# Patient Record
Sex: FEMALE | Race: WHITE | NOT HISPANIC OR LATINO | Employment: OTHER | URBAN - METROPOLITAN AREA
[De-identification: names, ages, dates, MRNs, and addresses within clinical notes are randomized per-mention and may not be internally consistent; named-entity substitution may affect disease eponyms.]

---

## 2023-08-27 PROBLEM — R73.03 PREDIABETES: Status: ACTIVE | Noted: 2023-08-27

## 2023-08-27 PROBLEM — R41.81 AGE-RELATED COGNITIVE DECLINE: Status: ACTIVE | Noted: 2023-08-27

## 2023-08-27 PROBLEM — F32.A DEPRESSION: Status: ACTIVE | Noted: 2023-08-27

## 2023-08-27 PROBLEM — E03.9 HYPOTHYROIDISM: Status: ACTIVE | Noted: 2023-08-27

## 2023-08-27 PROBLEM — F41.9 ANXIETY: Status: ACTIVE | Noted: 2023-08-27

## 2023-08-27 PROBLEM — E78.2 MIXED HYPERLIPIDEMIA: Status: ACTIVE | Noted: 2023-08-27

## 2023-08-27 RX ORDER — LEVOTHYROXINE SODIUM 25 UG/1
25 TABLET ORAL
COMMUNITY
Start: 2022-02-21 | End: 2023-10-05 | Stop reason: SDUPTHER

## 2023-08-27 RX ORDER — HYDROCHLOROTHIAZIDE 12.5 MG/1
12.5 CAPSULE ORAL DAILY
COMMUNITY
End: 2024-02-08 | Stop reason: ALTCHOICE

## 2023-08-27 RX ORDER — SIMVASTATIN 20 MG/1
20 TABLET, FILM COATED ORAL EVERY EVENING
COMMUNITY
Start: 2013-09-30 | End: 2024-02-08 | Stop reason: SDUPTHER

## 2023-08-27 RX ORDER — ACETAMINOPHEN 500 MG
5 TABLET ORAL DAILY
COMMUNITY

## 2024-01-02 ENCOUNTER — TELEPHONE (OUTPATIENT)
Dept: PRIMARY CARE | Facility: CLINIC | Age: 79
End: 2024-01-02
Payer: COMMERCIAL

## 2024-02-08 ENCOUNTER — HOSPITAL ENCOUNTER (OUTPATIENT)
Dept: RADIOLOGY | Facility: CLINIC | Age: 79
Discharge: HOME | End: 2024-02-08
Payer: COMMERCIAL

## 2024-02-08 ENCOUNTER — OFFICE VISIT (OUTPATIENT)
Dept: PRIMARY CARE | Facility: CLINIC | Age: 79
End: 2024-02-08
Payer: COMMERCIAL

## 2024-02-08 ENCOUNTER — LAB (OUTPATIENT)
Dept: LAB | Facility: LAB | Age: 79
End: 2024-02-08
Payer: COMMERCIAL

## 2024-02-08 VITALS
OXYGEN SATURATION: 96 % | HEART RATE: 65 BPM | TEMPERATURE: 98.3 F | HEIGHT: 60 IN | SYSTOLIC BLOOD PRESSURE: 148 MMHG | BODY MASS INDEX: 35.34 KG/M2 | WEIGHT: 180 LBS | DIASTOLIC BLOOD PRESSURE: 82 MMHG

## 2024-02-08 DIAGNOSIS — M15.9 PRIMARY OSTEOARTHRITIS INVOLVING MULTIPLE JOINTS: ICD-10-CM

## 2024-02-08 DIAGNOSIS — Z01.89 ENCOUNTER FOR ROUTINE LABORATORY TESTING: ICD-10-CM

## 2024-02-08 DIAGNOSIS — R41.81 AGE-RELATED COGNITIVE DECLINE: ICD-10-CM

## 2024-02-08 DIAGNOSIS — R60.0 BILATERAL LOWER EXTREMITY EDEMA: ICD-10-CM

## 2024-02-08 DIAGNOSIS — R53.81 PHYSICAL DEBILITY: ICD-10-CM

## 2024-02-08 DIAGNOSIS — E55.9 VITAMIN D DEFICIENCY: ICD-10-CM

## 2024-02-08 DIAGNOSIS — E78.2 MIXED HYPERLIPIDEMIA: ICD-10-CM

## 2024-02-08 DIAGNOSIS — E03.8 HYPOTHYROIDISM DUE TO HASHIMOTO'S THYROIDITIS: ICD-10-CM

## 2024-02-08 DIAGNOSIS — F41.9 ANXIETY: ICD-10-CM

## 2024-02-08 DIAGNOSIS — R73.03 PREDIABETES: ICD-10-CM

## 2024-02-08 DIAGNOSIS — E06.3 HYPOTHYROIDISM DUE TO HASHIMOTO'S THYROIDITIS: ICD-10-CM

## 2024-02-08 DIAGNOSIS — F32.A DEPRESSION, UNSPECIFIED DEPRESSION TYPE: ICD-10-CM

## 2024-02-08 DIAGNOSIS — R53.81 PHYSICAL DEBILITY: Primary | ICD-10-CM

## 2024-02-08 PROBLEM — I10 ESSENTIAL HYPERTENSION: Status: ACTIVE | Noted: 2024-02-08

## 2024-02-08 PROBLEM — M15.0 PRIMARY OSTEOARTHRITIS INVOLVING MULTIPLE JOINTS: Status: ACTIVE | Noted: 2024-02-08

## 2024-02-08 LAB
25(OH)D3 SERPL-MCNC: 29 NG/ML (ref 31–100)
ALBUMIN SERPL-MCNC: 4.2 G/DL (ref 3.5–5)
ALP BLD-CCNC: 87 U/L (ref 35–125)
ALT SERPL-CCNC: 19 U/L (ref 5–40)
ANION GAP SERPL CALC-SCNC: 11 MMOL/L
AST SERPL-CCNC: 22 U/L (ref 5–40)
BASOPHILS # BLD AUTO: 0.11 X10*3/UL (ref 0–0.1)
BASOPHILS NFR BLD AUTO: 1.6 %
BILIRUB DIRECT SERPL-MCNC: <0.2 MG/DL (ref 0–0.2)
BILIRUB SERPL-MCNC: 0.2 MG/DL (ref 0.1–1.2)
BUN SERPL-MCNC: 24 MG/DL (ref 8–25)
CALCIUM SERPL-MCNC: 9.4 MG/DL (ref 8.5–10.4)
CHLORIDE SERPL-SCNC: 102 MMOL/L (ref 97–107)
CO2 SERPL-SCNC: 32 MMOL/L (ref 24–31)
CREAT SERPL-MCNC: 0.8 MG/DL (ref 0.4–1.6)
EGFRCR SERPLBLD CKD-EPI 2021: 75 ML/MIN/1.73M*2
EOSINOPHIL # BLD AUTO: 0.21 X10*3/UL (ref 0–0.4)
EOSINOPHIL NFR BLD AUTO: 3.1 %
ERYTHROCYTE [DISTWIDTH] IN BLOOD BY AUTOMATED COUNT: 13.2 % (ref 11.5–14.5)
GLUCOSE SERPL-MCNC: 98 MG/DL (ref 65–99)
HCT VFR BLD AUTO: 38.7 % (ref 36–46)
HGB BLD-MCNC: 12.5 G/DL (ref 12–16)
IMM GRANULOCYTES # BLD AUTO: 0.02 X10*3/UL (ref 0–0.5)
IMM GRANULOCYTES NFR BLD AUTO: 0.3 % (ref 0–0.9)
LYMPHOCYTES # BLD AUTO: 2.13 X10*3/UL (ref 0.8–3)
LYMPHOCYTES NFR BLD AUTO: 31 %
MCH RBC QN AUTO: 27.8 PG (ref 26–34)
MCHC RBC AUTO-ENTMCNC: 32.3 G/DL (ref 32–36)
MCV RBC AUTO: 86 FL (ref 80–100)
MONOCYTES # BLD AUTO: 0.7 X10*3/UL (ref 0.05–0.8)
MONOCYTES NFR BLD AUTO: 10.2 %
NEUTROPHILS # BLD AUTO: 3.71 X10*3/UL (ref 1.6–5.5)
NEUTROPHILS NFR BLD AUTO: 53.8 %
NRBC BLD-RTO: 0 /100 WBCS (ref 0–0)
PLATELET # BLD AUTO: 293 X10*3/UL (ref 150–450)
POTASSIUM SERPL-SCNC: 4.1 MMOL/L (ref 3.4–5.1)
PROT SERPL-MCNC: 6.9 G/DL (ref 5.9–7.9)
RBC # BLD AUTO: 4.5 X10*6/UL (ref 4–5.2)
SODIUM SERPL-SCNC: 145 MMOL/L (ref 133–145)
T4 FREE SERPL-MCNC: 0.9 NG/DL (ref 0.9–1.7)
TSH SERPL DL<=0.05 MIU/L-ACNC: 5.44 MIU/L (ref 0.27–4.2)
WBC # BLD AUTO: 6.9 X10*3/UL (ref 4.4–11.3)

## 2024-02-08 PROCEDURE — 82306 VITAMIN D 25 HYDROXY: CPT

## 2024-02-08 PROCEDURE — 1126F AMNT PAIN NOTED NONE PRSNT: CPT | Performed by: STUDENT IN AN ORGANIZED HEALTH CARE EDUCATION/TRAINING PROGRAM

## 2024-02-08 PROCEDURE — 1036F TOBACCO NON-USER: CPT | Performed by: STUDENT IN AN ORGANIZED HEALTH CARE EDUCATION/TRAINING PROGRAM

## 2024-02-08 PROCEDURE — 84439 ASSAY OF FREE THYROXINE: CPT

## 2024-02-08 PROCEDURE — 3077F SYST BP >= 140 MM HG: CPT | Performed by: STUDENT IN AN ORGANIZED HEALTH CARE EDUCATION/TRAINING PROGRAM

## 2024-02-08 PROCEDURE — 73562 X-RAY EXAM OF KNEE 3: CPT | Mod: LT

## 2024-02-08 PROCEDURE — 82248 BILIRUBIN DIRECT: CPT

## 2024-02-08 PROCEDURE — 3079F DIAST BP 80-89 MM HG: CPT | Performed by: STUDENT IN AN ORGANIZED HEALTH CARE EDUCATION/TRAINING PROGRAM

## 2024-02-08 PROCEDURE — 84443 ASSAY THYROID STIM HORMONE: CPT

## 2024-02-08 PROCEDURE — 85025 COMPLETE CBC W/AUTO DIFF WBC: CPT

## 2024-02-08 PROCEDURE — 99215 OFFICE O/P EST HI 40 MIN: CPT | Performed by: STUDENT IN AN ORGANIZED HEALTH CARE EDUCATION/TRAINING PROGRAM

## 2024-02-08 PROCEDURE — 36415 COLL VENOUS BLD VENIPUNCTURE: CPT

## 2024-02-08 PROCEDURE — 1159F MED LIST DOCD IN RCRD: CPT | Performed by: STUDENT IN AN ORGANIZED HEALTH CARE EDUCATION/TRAINING PROGRAM

## 2024-02-08 PROCEDURE — 1160F RVW MEDS BY RX/DR IN RCRD: CPT | Performed by: STUDENT IN AN ORGANIZED HEALTH CARE EDUCATION/TRAINING PROGRAM

## 2024-02-08 PROCEDURE — 80053 COMPREHEN METABOLIC PANEL: CPT

## 2024-02-08 RX ORDER — SIMVASTATIN 20 MG/1
20 TABLET, FILM COATED ORAL DAILY
Start: 2024-02-08 | End: 2024-03-21 | Stop reason: SDUPTHER

## 2024-02-08 RX ORDER — LEVOTHYROXINE SODIUM 25 UG/1
25 TABLET ORAL
Start: 2024-02-08 | End: 2024-03-21 | Stop reason: SDUPTHER

## 2024-02-08 RX ORDER — FUROSEMIDE 20 MG/1
20 TABLET ORAL DAILY
COMMUNITY
Start: 2023-06-22 | End: 2024-02-08 | Stop reason: SDUPTHER

## 2024-02-08 RX ORDER — PAROXETINE HYDROCHLORIDE 40 MG/1
40 TABLET, FILM COATED ORAL DAILY
COMMUNITY
Start: 2023-08-14 | End: 2024-02-08 | Stop reason: SDUPTHER

## 2024-02-08 RX ORDER — PAROXETINE HYDROCHLORIDE 40 MG/1
40 TABLET, FILM COATED ORAL DAILY
Start: 2024-02-08 | End: 2024-03-21 | Stop reason: SDUPTHER

## 2024-02-08 RX ORDER — FUROSEMIDE 40 MG/1
40 TABLET ORAL DAILY
Qty: 90 TABLET | Refills: 3 | Status: SHIPPED | OUTPATIENT
Start: 2024-02-08 | End: 2024-03-21 | Stop reason: SDUPTHER

## 2024-02-08 ASSESSMENT — PAIN SCALES - GENERAL: PAINLEVEL: 0-NO PAIN

## 2024-02-08 ASSESSMENT — ENCOUNTER SYMPTOMS
GASTROINTESTINAL NEGATIVE: 1
CONSTITUTIONAL NEGATIVE: 1
CARDIOVASCULAR NEGATIVE: 1
RESPIRATORY NEGATIVE: 1

## 2024-02-08 ASSESSMENT — PATIENT HEALTH QUESTIONNAIRE - PHQ9
SUM OF ALL RESPONSES TO PHQ9 QUESTIONS 1 AND 2: 0
2. FEELING DOWN, DEPRESSED OR HOPELESS: NOT AT ALL
1. LITTLE INTEREST OR PLEASURE IN DOING THINGS: NOT AT ALL

## 2024-02-08 NOTE — PROGRESS NOTES
[facility]: [facility]  PROGRESS NOTE      Carmen Cao is a 78 y.o. female that is presenting today for Follow-up (6 month).    Assessment/Plan   Diagnoses and all orders for this visit:  Physical debility  -     Follow Up In Primary Care; Future  -     CBC and Auto Differential; Future  Age-related cognitive decline  -     Follow Up In Primary Care; Future  -     CBC and Auto Differential; Future  Hypothyroidism due to Hashimoto's thyroiditis  -     levothyroxine (Synthroid, Levoxyl) 25 mcg tablet; Take 1 tablet (25 mcg) by mouth once daily in the morning. Take before meals.  -     Follow Up In Primary Care; Future  -     TSH with reflex to Free T4 if abnormal; Future  Primary osteoarthritis involving multiple joints  -     Follow Up In Primary Care; Future  -     XR knee left 3 views; Future  Depression, unspecified depression type  -     PARoxetine (Paxil) 40 mg tablet; Take 1 tablet (40 mg) by mouth once daily.  -     Follow Up In Primary Care; Future  Bilateral lower extremity edema  -     furosemide (Lasix) 40 mg tablet; Take 1 tablet (40 mg) by mouth once daily.  -     Follow Up In Primary Care; Future  Mixed hyperlipidemia  -     simvastatin (Zocor) 20 mg tablet; Take 1 tablet (20 mg) by mouth once daily.  -     Follow Up In Primary Care; Future  Vitamin D deficiency  -     Follow Up In Primary Care; Future  -     Vitamin D 25-Hydroxy,Total (for eval of Vitamin D levels); Future  Prediabetes  -     Follow Up In Primary Care; Future  Anxiety  -     PARoxetine (Paxil) 40 mg tablet; Take 1 tablet (40 mg) by mouth once daily.  -     Follow Up In Primary Care; Future  Encounter for routine laboratory testing  -     CBC and Auto Differential; Future  -     Basic Metabolic Panel; Future  -     Hepatic Function Panel; Future  -     TSH with reflex to Free T4 if abnormal; Future  -     Vitamin D 25-Hydroxy,Total (for eval of Vitamin D levels); Future    - Patient's family (daughter, Thierno)  "is becoming progressively more upset and frustrated with the patient. Patient's daughter states that both she and the patient's spouse feel that the patient is becoming progressively more weak and fatigued and continues to fall at home. They are worried that the patient is becoming progressively less able to take care of herself.  - Discussed the situation with the patient. Patient denies that this is a problem, denies that she is falling more frequently at home. When asked about all of this though, she often states, \"I don't remember.\"   - MMSE done today with the patient. Scored a 26, lost points for day of the week alongside serial 7s.   - When I asked the patient what she does during the day, she deflects and tells me the things that she does not do during the day.   - This is a difficult situation. Based off of my evaluation with the patient, I do think that she lacks insight into what her problem is. At this moment, she blames most other people in her life for her own apathy and indifference. She says that she has nothing to do because her cleaning person does it for her. At the moment, I feel that the patient lacks insight and accountability for her own actions. Counseled the patient at length with lifestyle interventions.    Subjective   - The patient otherwise feels well and denies any acute symptoms or concerns at this time.  - The patient denies any changes or progression of their chronic medical problems.  - The patient denies any problems or concerns with their medications.      Review of Systems   Constitutional: Negative.    Respiratory: Negative.     Cardiovascular: Negative.    Gastrointestinal: Negative.       Objective   Vitals:    02/08/24 1434   BP: 148/82   Pulse: 65   Temp: 36.8 °C (98.3 °F)   SpO2: 96%      Body mass index is 35.15 kg/m².  Physical Exam  Constitutional:       General: She is not in acute distress.  Neck:      Vascular: No carotid bruit.   Cardiovascular:      Rate and Rhythm: " "Normal rate and regular rhythm.      Heart sounds: Normal heart sounds.   Pulmonary:      Effort: Pulmonary effort is normal.      Breath sounds: Normal breath sounds.   Musculoskeletal:         General: No swelling.   Neurological:      Mental Status: She is alert. Mental status is at baseline.   Psychiatric:         Mood and Affect: Mood normal.       Diagnostic Results   Lab Results   Component Value Date    GLUCOSE 101 (H) 07/15/2023    CALCIUM 9.1 07/15/2023     07/15/2023    K 4.2 07/15/2023    CO2 28 07/15/2023     07/15/2023    BUN 29 (H) 07/15/2023    CREATININE 0.8 07/15/2023     Lab Results   Component Value Date    ALT 16 07/15/2023    AST 22 07/15/2023    ALKPHOS 74 07/15/2023    BILITOT 0.2 07/15/2023     Lab Results   Component Value Date    WBC 6.7 07/15/2023    HGB 13.0 07/15/2023    HCT 39.9 07/15/2023    MCV 89.1 07/15/2023     07/15/2023     Lab Results   Component Value Date    CHOL 192 07/15/2023    CHOL 195 02/19/2022     Lab Results   Component Value Date    HDL 49 (L) 07/15/2023    HDL 59 02/19/2022     Lab Results   Component Value Date    LDLCALC 111 07/15/2023    LDLCALC 118 02/19/2022     Lab Results   Component Value Date    TRIG 162 (H) 07/15/2023    TRIG 92 02/19/2022     No components found for: \"CHOLHDL\"  Lab Results   Component Value Date    HGBA1C 5.9 07/15/2023     Other labs not included in the list above were reviewed either before or during this encounter.    History    No past medical history on file.  No past surgical history on file.  No family history on file.  Social History     Socioeconomic History    Marital status:      Spouse name: Not on file    Number of children: Not on file    Years of education: Not on file    Highest education level: Not on file   Occupational History    Not on file   Tobacco Use    Smoking status: Never    Smokeless tobacco: Never   Vaping Use    Vaping Use: Never used   Substance and Sexual Activity    Alcohol use: " Never    Drug use: Never    Sexual activity: Not on file   Other Topics Concern    Not on file   Social History Narrative    Not on file     Social Determinants of Health     Financial Resource Strain: Not on file   Food Insecurity: Not on file   Transportation Needs: Not on file   Physical Activity: Not on file   Stress: Not on file   Social Connections: Not on file   Intimate Partner Violence: Not on file   Housing Stability: Not on file     Allergies   Allergen Reactions    Ciprofloxacin Hives    Adhesive Rash     Current Outpatient Medications on File Prior to Visit   Medication Sig Dispense Refill    melatonin 5 mg tablet Take 1 tablet (5 mg) by mouth once daily.      [DISCONTINUED] furosemide (Lasix) 20 mg tablet Take 1 tablet (20 mg) by mouth once daily.      [DISCONTINUED] hydroCHLOROthiazide (Microzide) 12.5 mg capsule Take 1 capsule (12.5 mg) by mouth once daily.      [DISCONTINUED] levothyroxine (Synthroid, Levoxyl) 25 mcg tablet Take 1 tablet (25 mcg) by mouth once daily in the morning. Take before meals. 90 tablet 3    [DISCONTINUED] PARoxetine (Paxil) 40 mg tablet Take 1 tablet (40 mg) by mouth once daily.      [DISCONTINUED] simvastatin (Zocor) 20 mg tablet Take 1 tablet (20 mg) by mouth once daily in the evening.       No current facility-administered medications on file prior to visit.     Immunization History   Administered Date(s) Administered    Influenza, seasonal, injectable 01/16/2015     Patient's medical history was reviewed and updated either before or during this encounter.       Michael William MD

## 2024-03-21 ENCOUNTER — OFFICE VISIT (OUTPATIENT)
Dept: PRIMARY CARE | Facility: CLINIC | Age: 79
End: 2024-03-21
Payer: COMMERCIAL

## 2024-03-21 VITALS
TEMPERATURE: 98.5 F | HEART RATE: 74 BPM | HEIGHT: 60 IN | DIASTOLIC BLOOD PRESSURE: 80 MMHG | SYSTOLIC BLOOD PRESSURE: 132 MMHG | BODY MASS INDEX: 34.95 KG/M2 | WEIGHT: 178 LBS | OXYGEN SATURATION: 97 %

## 2024-03-21 DIAGNOSIS — E78.2 MIXED HYPERLIPIDEMIA: ICD-10-CM

## 2024-03-21 DIAGNOSIS — R53.81 PHYSICAL DEBILITY: ICD-10-CM

## 2024-03-21 DIAGNOSIS — F41.9 ANXIETY: ICD-10-CM

## 2024-03-21 DIAGNOSIS — E06.3 HYPOTHYROIDISM DUE TO HASHIMOTO'S THYROIDITIS: ICD-10-CM

## 2024-03-21 DIAGNOSIS — M15.9 PRIMARY OSTEOARTHRITIS INVOLVING MULTIPLE JOINTS: ICD-10-CM

## 2024-03-21 DIAGNOSIS — E66.9 CLASS 1 OBESITY WITHOUT SERIOUS COMORBIDITY WITH BODY MASS INDEX (BMI) OF 34.0 TO 34.9 IN ADULT, UNSPECIFIED OBESITY TYPE: Primary | ICD-10-CM

## 2024-03-21 DIAGNOSIS — R41.81 AGE-RELATED COGNITIVE DECLINE: ICD-10-CM

## 2024-03-21 DIAGNOSIS — E55.9 VITAMIN D DEFICIENCY: ICD-10-CM

## 2024-03-21 DIAGNOSIS — F32.A DEPRESSION, UNSPECIFIED DEPRESSION TYPE: ICD-10-CM

## 2024-03-21 DIAGNOSIS — E03.8 HYPOTHYROIDISM DUE TO HASHIMOTO'S THYROIDITIS: ICD-10-CM

## 2024-03-21 DIAGNOSIS — R60.0 BILATERAL LOWER EXTREMITY EDEMA: ICD-10-CM

## 2024-03-21 DIAGNOSIS — R73.03 PREDIABETES: ICD-10-CM

## 2024-03-21 PROBLEM — E78.5 HLD (HYPERLIPIDEMIA): Status: ACTIVE | Noted: 2023-08-27

## 2024-03-21 PROBLEM — M19.90 OA (OSTEOARTHRITIS): Status: ACTIVE | Noted: 2024-02-08

## 2024-03-21 PROCEDURE — 99214 OFFICE O/P EST MOD 30 MIN: CPT | Performed by: STUDENT IN AN ORGANIZED HEALTH CARE EDUCATION/TRAINING PROGRAM

## 2024-03-21 PROCEDURE — 1159F MED LIST DOCD IN RCRD: CPT | Performed by: STUDENT IN AN ORGANIZED HEALTH CARE EDUCATION/TRAINING PROGRAM

## 2024-03-21 PROCEDURE — 3075F SYST BP GE 130 - 139MM HG: CPT | Performed by: STUDENT IN AN ORGANIZED HEALTH CARE EDUCATION/TRAINING PROGRAM

## 2024-03-21 PROCEDURE — 1160F RVW MEDS BY RX/DR IN RCRD: CPT | Performed by: STUDENT IN AN ORGANIZED HEALTH CARE EDUCATION/TRAINING PROGRAM

## 2024-03-21 PROCEDURE — 1126F AMNT PAIN NOTED NONE PRSNT: CPT | Performed by: STUDENT IN AN ORGANIZED HEALTH CARE EDUCATION/TRAINING PROGRAM

## 2024-03-21 PROCEDURE — 3079F DIAST BP 80-89 MM HG: CPT | Performed by: STUDENT IN AN ORGANIZED HEALTH CARE EDUCATION/TRAINING PROGRAM

## 2024-03-21 PROCEDURE — 1036F TOBACCO NON-USER: CPT | Performed by: STUDENT IN AN ORGANIZED HEALTH CARE EDUCATION/TRAINING PROGRAM

## 2024-03-21 PROCEDURE — G2211 COMPLEX E/M VISIT ADD ON: HCPCS | Performed by: STUDENT IN AN ORGANIZED HEALTH CARE EDUCATION/TRAINING PROGRAM

## 2024-03-21 RX ORDER — BUPROPION HYDROCHLORIDE 150 MG/1
TABLET ORAL
Qty: 49 TABLET | Refills: 0 | Status: SHIPPED | OUTPATIENT
Start: 2024-03-21 | End: 2024-04-15 | Stop reason: HOSPADM

## 2024-03-21 RX ORDER — LEVOTHYROXINE SODIUM 50 UG/1
50 TABLET ORAL
Qty: 90 TABLET | Refills: 3 | Status: SHIPPED | OUTPATIENT
Start: 2024-03-21 | End: 2024-05-21 | Stop reason: SDUPTHER

## 2024-03-21 RX ORDER — SIMVASTATIN 20 MG/1
20 TABLET, FILM COATED ORAL DAILY
Start: 2024-03-21 | End: 2024-05-21 | Stop reason: SDUPTHER

## 2024-03-21 RX ORDER — MULTIVITAMIN
1 TABLET ORAL DAILY
Start: 2024-03-21 | End: 2024-06-10 | Stop reason: DRUGHIGH

## 2024-03-21 RX ORDER — PAROXETINE HYDROCHLORIDE 40 MG/1
40 TABLET, FILM COATED ORAL DAILY
Start: 2024-03-21 | End: 2024-05-21 | Stop reason: SDUPTHER

## 2024-03-21 RX ORDER — FUROSEMIDE 40 MG/1
40 TABLET ORAL DAILY
Start: 2024-03-21 | End: 2024-05-21 | Stop reason: SDUPTHER

## 2024-03-21 ASSESSMENT — PATIENT HEALTH QUESTIONNAIRE - PHQ9
1. LITTLE INTEREST OR PLEASURE IN DOING THINGS: NOT AT ALL
SUM OF ALL RESPONSES TO PHQ9 QUESTIONS 1 AND 2: 0
2. FEELING DOWN, DEPRESSED OR HOPELESS: NOT AT ALL

## 2024-03-21 ASSESSMENT — ENCOUNTER SYMPTOMS
RESPIRATORY NEGATIVE: 1
CONSTITUTIONAL NEGATIVE: 1
GASTROINTESTINAL NEGATIVE: 1
OCCASIONAL FEELINGS OF UNSTEADINESS: 1
CARDIOVASCULAR NEGATIVE: 1
DEPRESSION: 0
LOSS OF SENSATION IN FEET: 0

## 2024-03-21 ASSESSMENT — PAIN SCALES - GENERAL: PAINLEVEL: 0-NO PAIN

## 2024-03-21 NOTE — PROGRESS NOTES
[facility]: [facility]  PROGRESS NOTE      Carmen Cao is a 78 y.o. female that is presenting today for Follow-up.    Assessment/Plan   Diagnoses and all orders for this visit:  Class 1 obesity without serious comorbidity with body mass index (BMI) of 34.0 to 34.9 in adult, unspecified obesity type  Hypothyroidism due to Hashimoto's thyroiditis  -     levothyroxine (Synthroid, Levoxyl) 50 mcg tablet; Take 1 tablet (50 mcg) by mouth once daily in the morning. Take before meals.  Primary osteoarthritis involving multiple joints  Depression, unspecified depression type  -     multivitamin tablet; Take 1 tablet by mouth once daily. One-A-Day Women's 50+ Complete Multivitamin  -     PARoxetine (Paxil) 40 mg tablet; Take 1 tablet (40 mg) by mouth once daily.  -     buPROPion XL (Wellbutrin XL) 150 mg 24 hr tablet; Take 1 tablet (150 mg) by mouth once daily in the morning for 7 days, THEN 2 tablets (300 mg) once daily in the morning for 21 days. Do not crush, chew, or split..  -     Follow Up In Primary Care; Future  Bilateral lower extremity edema  -     furosemide (Lasix) 40 mg tablet; Take 1 tablet (40 mg) by mouth once daily.  Age-related cognitive decline  -     multivitamin tablet; Take 1 tablet by mouth once daily. One-A-Day Women's 50+ Complete Multivitamin  -     Follow Up In Primary Care; Future  Mixed hyperlipidemia  -     simvastatin (Zocor) 20 mg tablet; Take 1 tablet (20 mg) by mouth once daily.  Vitamin D deficiency  -     multivitamin tablet; Take 1 tablet by mouth once daily. One-A-Day Women's 50+ Complete Multivitamin  Physical debility  -     Follow Up In Primary Care  Prediabetes  Anxiety  -     multivitamin tablet; Take 1 tablet by mouth once daily. One-A-Day Women's 50+ Complete Multivitamin  -     PARoxetine (Paxil) 40 mg tablet; Take 1 tablet (40 mg) by mouth once daily.  -     buPROPion XL (Wellbutrin XL) 150 mg 24 hr tablet; Take 1 tablet (150 mg) by mouth once daily in  the morning for 7 days, THEN 2 tablets (300 mg) once daily in the morning for 21 days. Do not crush, chew, or split..  -     Follow Up In Primary Care; Future    - Significant medication and problem list reconciliation done today.  - Encouraged continued diet and exercise modification.   - Patient's vitals look great. Do not need to make changes at this time.  - Will increase the levothyroxine from 25mcg to 50mcg once/day.   - I think that the patient's mental health is still lack-luster. Patient noting that she basically has no desire to leave the house. I think that she would do well with wellbutrin in addition to her paxil. Patient reluctant but eventually agreeable.    Subjective   - The patient otherwise feels well and denies any acute symptoms or concerns at this time.  - The patient denies any changes or progression of their chronic medical problems.  - The patient denies any problems or concerns with their medications.      Review of Systems   Constitutional: Negative.    Respiratory: Negative.     Cardiovascular: Negative.    Gastrointestinal: Negative.    All other systems reviewed and are negative.     Objective   Vitals:    03/21/24 1625   BP: 132/80   Pulse: 74   Temp: 36.9 °C (98.5 °F)   SpO2: 97%      Body mass index is 34.76 kg/m².  Physical Exam  Vitals and nursing note reviewed.   Constitutional:       General: She is not in acute distress.  Neck:      Vascular: No carotid bruit.   Cardiovascular:      Rate and Rhythm: Normal rate and regular rhythm.      Heart sounds: Normal heart sounds.   Pulmonary:      Effort: Pulmonary effort is normal.      Breath sounds: Normal breath sounds.   Musculoskeletal:         General: No swelling.   Neurological:      Mental Status: She is alert. Mental status is at baseline.   Psychiatric:         Mood and Affect: Mood normal.       Diagnostic Results   Lab Results   Component Value Date    GLUCOSE 98 02/08/2024    CALCIUM 9.4 02/08/2024     02/08/2024    K  "4.1 02/08/2024    CO2 32 (H) 02/08/2024     02/08/2024    BUN 24 02/08/2024    CREATININE 0.80 02/08/2024     Lab Results   Component Value Date    ALT 19 02/08/2024    AST 22 02/08/2024    ALKPHOS 87 02/08/2024    BILITOT 0.2 02/08/2024     Lab Results   Component Value Date    WBC 6.9 02/08/2024    HGB 12.5 02/08/2024    HCT 38.7 02/08/2024    MCV 86 02/08/2024     02/08/2024     Lab Results   Component Value Date    CHOL 192 07/15/2023    CHOL 195 02/19/2022     Lab Results   Component Value Date    HDL 49 (L) 07/15/2023    HDL 59 02/19/2022     Lab Results   Component Value Date    LDLCALC 111 07/15/2023    LDLCALC 118 02/19/2022     Lab Results   Component Value Date    TRIG 162 (H) 07/15/2023    TRIG 92 02/19/2022     No components found for: \"CHOLHDL\"  Lab Results   Component Value Date    HGBA1C 5.9 07/15/2023     Other labs not included in the list above were reviewed either before or during this encounter.    History    History reviewed. No pertinent past medical history.  History reviewed. No pertinent surgical history.  No family history on file.  Social History     Socioeconomic History    Marital status:      Spouse name: Not on file    Number of children: Not on file    Years of education: Not on file    Highest education level: Not on file   Occupational History    Not on file   Tobacco Use    Smoking status: Never     Passive exposure: Never    Smokeless tobacco: Never   Vaping Use    Vaping Use: Never used   Substance and Sexual Activity    Alcohol use: Never    Drug use: Never    Sexual activity: Not on file   Other Topics Concern    Not on file   Social History Narrative    Not on file     Social Determinants of Health     Financial Resource Strain: Not on file   Food Insecurity: Not on file   Transportation Needs: Not on file   Physical Activity: Not on file   Stress: Not on file   Social Connections: Not on file   Intimate Partner Violence: Not on file   Housing Stability: " Not on file     Allergies   Allergen Reactions    Ciprofloxacin Hives    Adhesive Rash     Current Outpatient Medications on File Prior to Visit   Medication Sig Dispense Refill    melatonin 5 mg tablet Take 1 tablet (5 mg) by mouth once daily.      [DISCONTINUED] furosemide (Lasix) 40 mg tablet Take 1 tablet (40 mg) by mouth once daily. 90 tablet 3    [DISCONTINUED] levothyroxine (Synthroid, Levoxyl) 25 mcg tablet Take 1 tablet (25 mcg) by mouth once daily in the morning. Take before meals.      [DISCONTINUED] PARoxetine (Paxil) 40 mg tablet Take 1 tablet (40 mg) by mouth once daily.      [DISCONTINUED] simvastatin (Zocor) 20 mg tablet Take 1 tablet (20 mg) by mouth once daily.       No current facility-administered medications on file prior to visit.     Immunization History   Administered Date(s) Administered    Influenza, seasonal, injectable 01/16/2015     Patient's medical history was reviewed and updated either before or during this encounter.       Michael William MD

## 2024-03-21 NOTE — PATIENT INSTRUCTIONS
Diagnoses and all orders for this visit:  Class 1 obesity without serious comorbidity with body mass index (BMI) of 34.0 to 34.9 in adult, unspecified obesity type  Hypothyroidism due to Hashimoto's thyroiditis  -     levothyroxine (Synthroid, Levoxyl) 50 mcg tablet; Take 1 tablet (50 mcg) by mouth once daily in the morning. Take before meals.  Primary osteoarthritis involving multiple joints  Depression, unspecified depression type  -     multivitamin tablet; Take 1 tablet by mouth once daily. One-A-Day Women's 50+ Complete Multivitamin  -     PARoxetine (Paxil) 40 mg tablet; Take 1 tablet (40 mg) by mouth once daily.  -     buPROPion XL (Wellbutrin XL) 150 mg 24 hr tablet; Take 1 tablet (150 mg) by mouth once daily in the morning for 7 days, THEN 2 tablets (300 mg) once daily in the morning for 21 days. Do not crush, chew, or split..  -     Follow Up In Primary Care; Future  Bilateral lower extremity edema  -     furosemide (Lasix) 40 mg tablet; Take 1 tablet (40 mg) by mouth once daily.  Age-related cognitive decline  -     multivitamin tablet; Take 1 tablet by mouth once daily. One-A-Day Women's 50+ Complete Multivitamin  -     Follow Up In Primary Care; Future  Mixed hyperlipidemia  -     simvastatin (Zocor) 20 mg tablet; Take 1 tablet (20 mg) by mouth once daily.  Vitamin D deficiency  -     multivitamin tablet; Take 1 tablet by mouth once daily. One-A-Day Women's 50+ Complete Multivitamin  Physical debility  -     Follow Up In Primary Care  Prediabetes  Anxiety  -     multivitamin tablet; Take 1 tablet by mouth once daily. One-A-Day Women's 50+ Complete Multivitamin  -     PARoxetine (Paxil) 40 mg tablet; Take 1 tablet (40 mg) by mouth once daily.  -     buPROPion XL (Wellbutrin XL) 150 mg 24 hr tablet; Take 1 tablet (150 mg) by mouth once daily in the morning for 7 days, THEN 2 tablets (300 mg) once daily in the morning for 21 days. Do not crush, chew, or split..  -     Follow Up In Primary Care; Future      - Significant medication and problem list reconciliation done today.  - Encouraged continued diet and exercise modification.   - Patient's vitals look great. Do not need to make changes at this time.  - Will increase the levothyroxine from 25mcg to 50mcg once/day.   - I think that the patient's mental health is still lack-luster. Patient noting that she basically has no desire to leave the house. I think that she would do well with wellbutrin in addition to her paxil. Patient reluctant but eventually agreeable.

## 2024-04-03 ENCOUNTER — TELEPHONE (OUTPATIENT)
Dept: PRIMARY CARE | Facility: CLINIC | Age: 79
End: 2024-04-03
Payer: COMMERCIAL

## 2024-04-03 NOTE — TELEPHONE ENCOUNTER
"Pt called into office \"since I was told to today\". Pt doesn't know why. Asked for an update and she had none.   "

## 2024-04-10 ENCOUNTER — APPOINTMENT (OUTPATIENT)
Dept: RADIOLOGY | Facility: HOSPITAL | Age: 79
DRG: 057 | End: 2024-04-10
Payer: COMMERCIAL

## 2024-04-10 ENCOUNTER — HOSPITAL ENCOUNTER (INPATIENT)
Facility: HOSPITAL | Age: 79
LOS: 3 days | Discharge: NURSING, REHAB OR LONG-TERM CARE | DRG: 057 | End: 2024-04-15
Attending: EMERGENCY MEDICINE | Admitting: INTERNAL MEDICINE
Payer: COMMERCIAL

## 2024-04-10 DIAGNOSIS — R25.1 LIMB TREMOR: ICD-10-CM

## 2024-04-10 DIAGNOSIS — R53.1 GENERALIZED WEAKNESS: Primary | ICD-10-CM

## 2024-04-10 LAB
ALBUMIN SERPL-MCNC: 4 G/DL (ref 3.5–5)
ALP BLD-CCNC: 84 U/L (ref 35–125)
ALT SERPL-CCNC: 34 U/L (ref 5–40)
ANION GAP SERPL CALC-SCNC: 10 MMOL/L
AST SERPL-CCNC: 39 U/L (ref 5–40)
BASOPHILS # BLD AUTO: 0.07 X10*3/UL (ref 0–0.1)
BASOPHILS NFR BLD AUTO: 0.8 %
BILIRUB SERPL-MCNC: 0.4 MG/DL (ref 0.1–1.2)
BUN SERPL-MCNC: 24 MG/DL (ref 8–25)
CALCIUM SERPL-MCNC: 9.3 MG/DL (ref 8.5–10.4)
CHLORIDE SERPL-SCNC: 98 MMOL/L (ref 97–107)
CO2 SERPL-SCNC: 31 MMOL/L (ref 24–31)
CREAT SERPL-MCNC: 0.9 MG/DL (ref 0.4–1.6)
EGFRCR SERPLBLD CKD-EPI 2021: 65 ML/MIN/1.73M*2
EOSINOPHIL # BLD AUTO: 0.18 X10*3/UL (ref 0–0.4)
EOSINOPHIL NFR BLD AUTO: 2 %
ERYTHROCYTE [DISTWIDTH] IN BLOOD BY AUTOMATED COUNT: 13.8 % (ref 11.5–14.5)
GLUCOSE SERPL-MCNC: 110 MG/DL (ref 65–99)
HCT VFR BLD AUTO: 38.7 % (ref 36–46)
HGB BLD-MCNC: 12.7 G/DL (ref 12–16)
IMM GRANULOCYTES # BLD AUTO: 0.04 X10*3/UL (ref 0–0.5)
IMM GRANULOCYTES NFR BLD AUTO: 0.5 % (ref 0–0.9)
LYMPHOCYTES # BLD AUTO: 2.25 X10*3/UL (ref 0.8–3)
LYMPHOCYTES NFR BLD AUTO: 25.5 %
MCH RBC QN AUTO: 28.2 PG (ref 26–34)
MCHC RBC AUTO-ENTMCNC: 32.8 G/DL (ref 32–36)
MCV RBC AUTO: 86 FL (ref 80–100)
MONOCYTES # BLD AUTO: 0.88 X10*3/UL (ref 0.05–0.8)
MONOCYTES NFR BLD AUTO: 10 %
NEUTROPHILS # BLD AUTO: 5.4 X10*3/UL (ref 1.6–5.5)
NEUTROPHILS NFR BLD AUTO: 61.2 %
NRBC BLD-RTO: 0 /100 WBCS (ref 0–0)
PLATELET # BLD AUTO: 273 X10*3/UL (ref 150–450)
POTASSIUM SERPL-SCNC: 3.3 MMOL/L (ref 3.4–5.1)
PROT SERPL-MCNC: 7.2 G/DL (ref 5.9–7.9)
RBC # BLD AUTO: 4.51 X10*6/UL (ref 4–5.2)
SODIUM SERPL-SCNC: 139 MMOL/L (ref 133–145)
WBC # BLD AUTO: 8.8 X10*3/UL (ref 4.4–11.3)

## 2024-04-10 PROCEDURE — 83735 ASSAY OF MAGNESIUM: CPT | Performed by: EMERGENCY MEDICINE

## 2024-04-10 PROCEDURE — 85025 COMPLETE CBC W/AUTO DIFF WBC: CPT | Performed by: EMERGENCY MEDICINE

## 2024-04-10 PROCEDURE — 70450 CT HEAD/BRAIN W/O DYE: CPT

## 2024-04-10 PROCEDURE — 2500000001 HC RX 250 WO HCPCS SELF ADMINISTERED DRUGS (ALT 637 FOR MEDICARE OP): Performed by: EMERGENCY MEDICINE

## 2024-04-10 PROCEDURE — 70450 CT HEAD/BRAIN W/O DYE: CPT | Performed by: SURGERY

## 2024-04-10 PROCEDURE — 99285 EMERGENCY DEPT VISIT HI MDM: CPT | Mod: 25

## 2024-04-10 PROCEDURE — 80053 COMPREHEN METABOLIC PANEL: CPT | Performed by: EMERGENCY MEDICINE

## 2024-04-10 PROCEDURE — 2500000004 HC RX 250 GENERAL PHARMACY W/ HCPCS (ALT 636 FOR OP/ED): Performed by: EMERGENCY MEDICINE

## 2024-04-10 PROCEDURE — 96360 HYDRATION IV INFUSION INIT: CPT

## 2024-04-10 PROCEDURE — 84443 ASSAY THYROID STIM HORMONE: CPT | Performed by: EMERGENCY MEDICINE

## 2024-04-10 PROCEDURE — 82607 VITAMIN B-12: CPT | Performed by: EMERGENCY MEDICINE

## 2024-04-10 PROCEDURE — 36415 COLL VENOUS BLD VENIPUNCTURE: CPT | Performed by: EMERGENCY MEDICINE

## 2024-04-10 RX ORDER — HYDROXYZINE HYDROCHLORIDE 25 MG/1
25 TABLET, FILM COATED ORAL ONCE
Status: COMPLETED | OUTPATIENT
Start: 2024-04-10 | End: 2024-04-10

## 2024-04-10 RX ADMIN — SODIUM CHLORIDE 500 ML: 900 INJECTION, SOLUTION INTRAVENOUS at 23:31

## 2024-04-10 RX ADMIN — HYDROXYZINE HYDROCHLORIDE 25 MG: 25 TABLET, FILM COATED ORAL at 23:30

## 2024-04-10 ASSESSMENT — COLUMBIA-SUICIDE SEVERITY RATING SCALE - C-SSRS
2. HAVE YOU ACTUALLY HAD ANY THOUGHTS OF KILLING YOURSELF?: NO
6. HAVE YOU EVER DONE ANYTHING, STARTED TO DO ANYTHING, OR PREPARED TO DO ANYTHING TO END YOUR LIFE?: NO
1. IN THE PAST MONTH, HAVE YOU WISHED YOU WERE DEAD OR WISHED YOU COULD GO TO SLEEP AND NOT WAKE UP?: NO

## 2024-04-10 ASSESSMENT — PAIN DESCRIPTION - LOCATION: LOCATION: KNEE

## 2024-04-10 ASSESSMENT — PAIN DESCRIPTION - ORIENTATION: ORIENTATION: LEFT

## 2024-04-10 ASSESSMENT — PAIN SCALES - GENERAL: PAINLEVEL_OUTOF10: 0 - NO PAIN

## 2024-04-10 ASSESSMENT — PAIN - FUNCTIONAL ASSESSMENT: PAIN_FUNCTIONAL_ASSESSMENT: 0-10

## 2024-04-11 ENCOUNTER — APPOINTMENT (OUTPATIENT)
Dept: RADIOLOGY | Facility: HOSPITAL | Age: 79
DRG: 057 | End: 2024-04-11
Payer: COMMERCIAL

## 2024-04-11 PROBLEM — R53.1 GENERAL WEAKNESS: Status: ACTIVE | Noted: 2024-04-11

## 2024-04-11 LAB
ALBUMIN SERPL-MCNC: 3.4 G/DL (ref 3.5–5)
ALP BLD-CCNC: 76 U/L (ref 35–125)
ALT SERPL-CCNC: 31 U/L (ref 5–40)
ANION GAP SERPL CALC-SCNC: 8 MMOL/L
AST SERPL-CCNC: 41 U/L (ref 5–40)
BILIRUB SERPL-MCNC: 0.6 MG/DL (ref 0.1–1.2)
BUN SERPL-MCNC: 19 MG/DL (ref 8–25)
CALCIUM SERPL-MCNC: 8.7 MG/DL (ref 8.5–10.4)
CHLORIDE SERPL-SCNC: 103 MMOL/L (ref 97–107)
CO2 SERPL-SCNC: 28 MMOL/L (ref 24–31)
CREAT SERPL-MCNC: 0.8 MG/DL (ref 0.4–1.6)
EGFRCR SERPLBLD CKD-EPI 2021: 75 ML/MIN/1.73M*2
ERYTHROCYTE [DISTWIDTH] IN BLOOD BY AUTOMATED COUNT: 13.5 % (ref 11.5–14.5)
GLUCOSE SERPL-MCNC: 97 MG/DL (ref 65–99)
HCT VFR BLD AUTO: 36.8 % (ref 36–46)
HGB BLD-MCNC: 11.9 G/DL (ref 12–16)
MAGNESIUM SERPL-MCNC: 2.1 MG/DL (ref 1.6–3.1)
MCH RBC QN AUTO: 28.2 PG (ref 26–34)
MCHC RBC AUTO-ENTMCNC: 32.3 G/DL (ref 32–36)
MCV RBC AUTO: 87 FL (ref 80–100)
NRBC BLD-RTO: 0 /100 WBCS (ref 0–0)
PLATELET # BLD AUTO: 226 X10*3/UL (ref 150–450)
POTASSIUM SERPL-SCNC: 3 MMOL/L (ref 3.4–5.1)
PROT SERPL-MCNC: 6.2 G/DL (ref 5.9–7.9)
RBC # BLD AUTO: 4.22 X10*6/UL (ref 4–5.2)
SODIUM SERPL-SCNC: 139 MMOL/L (ref 133–145)
TSH SERPL DL<=0.05 MIU/L-ACNC: 1.63 MIU/L (ref 0.27–4.2)
VIT B12 SERPL-MCNC: >2000 PG/ML (ref 211–946)
WBC # BLD AUTO: 7.5 X10*3/UL (ref 4.4–11.3)

## 2024-04-11 PROCEDURE — 85027 COMPLETE CBC AUTOMATED: CPT | Performed by: INTERNAL MEDICINE

## 2024-04-11 PROCEDURE — 70551 MRI BRAIN STEM W/O DYE: CPT | Performed by: RADIOLOGY

## 2024-04-11 PROCEDURE — 36415 COLL VENOUS BLD VENIPUNCTURE: CPT | Performed by: INTERNAL MEDICINE

## 2024-04-11 PROCEDURE — G0378 HOSPITAL OBSERVATION PER HR: HCPCS

## 2024-04-11 PROCEDURE — 2500000002 HC RX 250 W HCPCS SELF ADMINISTERED DRUGS (ALT 637 FOR MEDICARE OP, ALT 636 FOR OP/ED): Performed by: INTERNAL MEDICINE

## 2024-04-11 PROCEDURE — 96372 THER/PROPH/DIAG INJ SC/IM: CPT | Performed by: INTERNAL MEDICINE

## 2024-04-11 PROCEDURE — 97166 OT EVAL MOD COMPLEX 45 MIN: CPT | Mod: GO

## 2024-04-11 PROCEDURE — 2500000002 HC RX 250 W HCPCS SELF ADMINISTERED DRUGS (ALT 637 FOR MEDICARE OP, ALT 636 FOR OP/ED): Mod: MUE | Performed by: INTERNAL MEDICINE

## 2024-04-11 PROCEDURE — 2500000001 HC RX 250 WO HCPCS SELF ADMINISTERED DRUGS (ALT 637 FOR MEDICARE OP): Performed by: INTERNAL MEDICINE

## 2024-04-11 PROCEDURE — 2500000004 HC RX 250 GENERAL PHARMACY W/ HCPCS (ALT 636 FOR OP/ED): Performed by: INTERNAL MEDICINE

## 2024-04-11 PROCEDURE — 72148 MRI LUMBAR SPINE W/O DYE: CPT | Performed by: RADIOLOGY

## 2024-04-11 PROCEDURE — 72148 MRI LUMBAR SPINE W/O DYE: CPT

## 2024-04-11 PROCEDURE — C9113 INJ PANTOPRAZOLE SODIUM, VIA: HCPCS | Performed by: INTERNAL MEDICINE

## 2024-04-11 PROCEDURE — 70551 MRI BRAIN STEM W/O DYE: CPT

## 2024-04-11 PROCEDURE — 97162 PT EVAL MOD COMPLEX 30 MIN: CPT | Mod: GP

## 2024-04-11 PROCEDURE — 80053 COMPREHEN METABOLIC PANEL: CPT | Performed by: INTERNAL MEDICINE

## 2024-04-11 RX ORDER — BISMUTH SUBSALICYLATE 262 MG
1 TABLET,CHEWABLE ORAL DAILY
Status: DISCONTINUED | OUTPATIENT
Start: 2024-04-11 | End: 2024-04-15 | Stop reason: HOSPADM

## 2024-04-11 RX ORDER — ENOXAPARIN SODIUM 100 MG/ML
40 INJECTION SUBCUTANEOUS NIGHTLY
Status: DISCONTINUED | OUTPATIENT
Start: 2024-04-11 | End: 2024-04-15 | Stop reason: HOSPADM

## 2024-04-11 RX ORDER — PANTOPRAZOLE SODIUM 40 MG/10ML
40 INJECTION, POWDER, LYOPHILIZED, FOR SOLUTION INTRAVENOUS
Status: DISCONTINUED | OUTPATIENT
Start: 2024-04-11 | End: 2024-04-15 | Stop reason: HOSPADM

## 2024-04-11 RX ORDER — POLYETHYLENE GLYCOL 3350 17 G/17G
17 POWDER, FOR SOLUTION ORAL DAILY
Status: DISCONTINUED | OUTPATIENT
Start: 2024-04-11 | End: 2024-04-15 | Stop reason: HOSPADM

## 2024-04-11 RX ORDER — SIMVASTATIN 20 MG/1
20 TABLET, FILM COATED ORAL DAILY
Status: DISCONTINUED | OUTPATIENT
Start: 2024-04-11 | End: 2024-04-15 | Stop reason: HOSPADM

## 2024-04-11 RX ORDER — BUPROPION HYDROCHLORIDE 100 MG/1
50 TABLET ORAL EVERY MORNING
Status: DISCONTINUED | OUTPATIENT
Start: 2024-04-11 | End: 2024-04-11

## 2024-04-11 RX ORDER — LEVOTHYROXINE SODIUM 50 UG/1
50 TABLET ORAL
Status: DISCONTINUED | OUTPATIENT
Start: 2024-04-11 | End: 2024-04-15 | Stop reason: HOSPADM

## 2024-04-11 RX ORDER — PAROXETINE HYDROCHLORIDE 20 MG/1
40 TABLET, FILM COATED ORAL DAILY
Status: DISCONTINUED | OUTPATIENT
Start: 2024-04-11 | End: 2024-04-15 | Stop reason: HOSPADM

## 2024-04-11 RX ORDER — ACETAMINOPHEN 325 MG/1
650 TABLET ORAL EVERY 4 HOURS PRN
Status: DISCONTINUED | OUTPATIENT
Start: 2024-04-11 | End: 2024-04-15 | Stop reason: HOSPADM

## 2024-04-11 RX ORDER — TIZANIDINE 2 MG/1
2 TABLET ORAL EVERY 6 HOURS PRN
Status: DISCONTINUED | OUTPATIENT
Start: 2024-04-11 | End: 2024-04-15 | Stop reason: HOSPADM

## 2024-04-11 RX ORDER — NYSTATIN 100000 [USP'U]/G
1 POWDER TOPICAL 2 TIMES DAILY
Status: DISCONTINUED | OUTPATIENT
Start: 2024-04-11 | End: 2024-04-15 | Stop reason: HOSPADM

## 2024-04-11 RX ORDER — ACETAMINOPHEN 650 MG/1
650 SUPPOSITORY RECTAL EVERY 4 HOURS PRN
Status: DISCONTINUED | OUTPATIENT
Start: 2024-04-11 | End: 2024-04-15 | Stop reason: HOSPADM

## 2024-04-11 RX ORDER — ACETAMINOPHEN 160 MG/5ML
650 SOLUTION ORAL EVERY 4 HOURS PRN
Status: DISCONTINUED | OUTPATIENT
Start: 2024-04-11 | End: 2024-04-15 | Stop reason: HOSPADM

## 2024-04-11 RX ORDER — FUROSEMIDE 40 MG/1
40 TABLET ORAL DAILY
Status: DISCONTINUED | OUTPATIENT
Start: 2024-04-11 | End: 2024-04-15 | Stop reason: HOSPADM

## 2024-04-11 RX ORDER — DIAZEPAM 5 MG/ML
2 INJECTION, SOLUTION INTRAMUSCULAR; INTRAVENOUS ONCE
Status: COMPLETED | OUTPATIENT
Start: 2024-04-11 | End: 2024-04-11

## 2024-04-11 RX ORDER — ACETAMINOPHEN 500 MG
5 TABLET ORAL NIGHTLY PRN
Status: DISCONTINUED | OUTPATIENT
Start: 2024-04-11 | End: 2024-04-15 | Stop reason: HOSPADM

## 2024-04-11 RX ORDER — PANTOPRAZOLE SODIUM 40 MG/1
40 TABLET, DELAYED RELEASE ORAL
Status: DISCONTINUED | OUTPATIENT
Start: 2024-04-11 | End: 2024-04-15 | Stop reason: HOSPADM

## 2024-04-11 RX ADMIN — NYSTATIN 1 APPLICATION: 100000 POWDER TOPICAL at 09:25

## 2024-04-11 RX ADMIN — TIZANIDINE 2 MG: 2 TABLET ORAL at 18:41

## 2024-04-11 RX ADMIN — LEVOTHYROXINE SODIUM 50 MCG: 0.05 TABLET ORAL at 06:14

## 2024-04-11 RX ADMIN — ENOXAPARIN SODIUM 40 MG: 40 INJECTION SUBCUTANEOUS at 21:11

## 2024-04-11 RX ADMIN — BUPROPION HYDROCHLORIDE 50 MG: 100 TABLET, FILM COATED ORAL at 09:25

## 2024-04-11 RX ADMIN — PANTOPRAZOLE SODIUM 40 MG: 40 INJECTION, POWDER, FOR SOLUTION INTRAVENOUS at 06:14

## 2024-04-11 RX ADMIN — POLYETHYLENE GLYCOL 3350 17 G: 17 POWDER, FOR SOLUTION ORAL at 09:25

## 2024-04-11 RX ADMIN — FUROSEMIDE 40 MG: 40 TABLET ORAL at 09:25

## 2024-04-11 RX ADMIN — SIMVASTATIN 20 MG: 20 TABLET, FILM COATED ORAL at 09:25

## 2024-04-11 RX ADMIN — DIAZEPAM 2 MG: 5 INJECTION INTRAMUSCULAR; INTRAVENOUS at 16:26

## 2024-04-11 RX ADMIN — MULTIVITAMIN TABLET 1 TABLET: TABLET at 09:25

## 2024-04-11 RX ADMIN — NYSTATIN 1 APPLICATION: 100000 POWDER TOPICAL at 21:11

## 2024-04-11 RX ADMIN — PAROXETINE HYDROCHLORIDE 40 MG: 20 TABLET, FILM COATED ORAL at 09:25

## 2024-04-11 SDOH — SOCIAL STABILITY: SOCIAL INSECURITY: WERE YOU ABLE TO COMPLETE ALL THE BEHAVIORAL HEALTH SCREENINGS?: NO

## 2024-04-11 ASSESSMENT — COGNITIVE AND FUNCTIONAL STATUS - GENERAL
TOILETING: TOTAL
HELP NEEDED FOR BATHING: A LOT
HELP NEEDED FOR BATHING: A LOT
EATING MEALS: A LITTLE
DRESSING REGULAR LOWER BODY CLOTHING: TOTAL
MOVING TO AND FROM BED TO CHAIR: A LOT
STANDING UP FROM CHAIR USING ARMS: A LOT
DRESSING REGULAR UPPER BODY CLOTHING: A LITTLE
MOVING TO AND FROM BED TO CHAIR: TOTAL
DRESSING REGULAR UPPER BODY CLOTHING: A LOT
PATIENT BASELINE BEDBOUND: NO
MOVING FROM LYING ON BACK TO SITTING ON SIDE OF FLAT BED WITH BEDRAILS: A LOT
STANDING UP FROM CHAIR USING ARMS: A LOT
CLIMB 3 TO 5 STEPS WITH RAILING: A LOT
PERSONAL GROOMING: A LOT
DAILY ACTIVITIY SCORE: 13
DRESSING REGULAR LOWER BODY CLOTHING: A LOT
WALKING IN HOSPITAL ROOM: A LOT
PERSONAL GROOMING: A LITTLE
MOBILITY SCORE: 10
DAILY ACTIVITIY SCORE: 14
TOILETING: A LOT
TURNING FROM BACK TO SIDE WHILE IN FLAT BAD: A LITTLE
WALKING IN HOSPITAL ROOM: TOTAL
TURNING FROM BACK TO SIDE WHILE IN FLAT BAD: A LOT
MOVING FROM LYING ON BACK TO SITTING ON SIDE OF FLAT BED WITH BEDRAILS: A LITTLE
CLIMB 3 TO 5 STEPS WITH RAILING: TOTAL
MOBILITY SCORE: 13

## 2024-04-11 ASSESSMENT — LIFESTYLE VARIABLES
HOW OFTEN DO YOU HAVE A DRINK CONTAINING ALCOHOL: PATIENT UNABLE TO ANSWER
HOW OFTEN DO YOU HAVE 6 OR MORE DRINKS ON ONE OCCASION: PATIENT UNABLE TO ANSWER
SKIP TO QUESTIONS 9-10: 0
AUDIT-C TOTAL SCORE: -1
HOW MANY STANDARD DRINKS CONTAINING ALCOHOL DO YOU HAVE ON A TYPICAL DAY: PATIENT UNABLE TO ANSWER
AUDIT-C TOTAL SCORE: -1

## 2024-04-11 ASSESSMENT — PAIN SCALES - GENERAL
PAINLEVEL_OUTOF10: 0 - NO PAIN

## 2024-04-11 ASSESSMENT — ACTIVITIES OF DAILY LIVING (ADL)
WALKS IN HOME: UNABLE TO ASSESS
BATHING: NEEDS ASSISTANCE
HEARING - LEFT EAR: FUNCTIONAL
ADL_ASSISTANCE: NEEDS ASSISTANCE
BATHING_ASSISTANCE: MODERATE
JUDGMENT_ADEQUATE_SAFELY_COMPLETE_DAILY_ACTIVITIES: NO
GROOMING: NEEDS ASSISTANCE
HEARING - RIGHT EAR: FUNCTIONAL
FEEDING YOURSELF: UNABLE TO ASSESS
DRESSING YOURSELF: NEEDS ASSISTANCE
PATIENT'S MEMORY ADEQUATE TO SAFELY COMPLETE DAILY ACTIVITIES?: NO
TOILETING: UNABLE TO ASSESS
ADL_ASSISTANCE: NEEDS ASSISTANCE
ADEQUATE_TO_COMPLETE_ADL: YES
ASSISTIVE_DEVICE: EYEGLASSES;WALKER
LACK_OF_TRANSPORTATION: NO

## 2024-04-11 ASSESSMENT — PAIN - FUNCTIONAL ASSESSMENT
PAIN_FUNCTIONAL_ASSESSMENT: 0-10

## 2024-04-11 NOTE — PROGRESS NOTES
Physical Therapy Evaluation    Patient Name: Carmen Cao  MRN: [id number]  Today's Date: 4/11/2024   Time Calculation  Start Time: 1024  Stop Time: 1045  Time Calculation (min): 21 min    Assessment/Plan   PT Assessment  PT Assessment Results: Decreased strength, Decreased range of motion, Decreased endurance, Decreased mobility, Impaired balance, Decreased coordination, Decreased cognition, Impaired judgement, Decreased safety awareness, Obesity, Decreased skin integrity, Pain  Rehab Prognosis: Fair  Evaluation/Treatment Tolerance: Patient tolerated treatment well  Medical Staff Made Aware: Yes  End of Session Communication: Bedside nurse  Assessment Comment: 78 year old female admits with Gait Abnormality, recurrent falls, and depression. On evaluation today, she requires Max A to stand at EOB and is unable to ambulate significantly demonstrating a significant loss of functional independence. Skilled PT warranted for PLOF restoration. Moderate Intensity Rehab is recommended at DC  End of Session Patient Position: Bed, 3 rail up, Alarm on  IP OR SWING BED PT PLAN  Inpatient or Swing Bed: Inpatient  PT Plan  Treatment/Interventions: Bed mobility, Transfer training, Gait training, Balance training, Strengthening, Endurance training, Range of motion, Therapeutic exercise, Home exercise program, Therapeutic activity  PT Plan: Skilled PT  PT Frequency: 5 times per week  PT Discharge Recommendations: Moderate intensity level of continued care  Equipment Recommended upon Discharge: Wheeled walker  PT Recommended Transfer Status: Assist x2, Assistive device  PT - OK to Discharge: Yes      Subjective   General Visit Information:  General  Reason for Referral: Impaired Mobility  Referred By: Dr. Sage  Past Medical History Relevant to Rehab: Depression  Family/Caregiver Present: No  Prior to Session Communication: Bedside nurse  Patient Position Received: Bed, 4 rail up, Alarm on  Preferred Learning Style: verbal  General  "Comment: 78 year old female admits with Gait Abnormality, Recurrent Falls, and depression  Home Living:  Home Living  Type of Home: House  Lives With: Spouse  Home Adaptive Equipment: Walker rolling or standard  Home Layout: One level  Home Access: Stairs to enter with rails  Entrance Stairs-Rails: Right  Entrance Stairs-Number of Steps: 3  Bathroom Shower/Tub: Tub/shower unit  Bathroom Equipment: Grab bars in shower, Shower chair with back  Prior Level of Function:  Prior Function Per Pt/Caregiver Report  Level of [place]: Needs assistance with ADLs, Needs assistance with homemaking  Receives Help From: Family, Primary caregiver  ADL Assistance: Needs assistance (Pt visibly struggling to self feed herself at start of session today. RN notified. Denies issues with this at home? Reports spouse assists with tub tansfer+bathing. Ind dressing reported)  Homemaking Assistance: Needs assistance (Reports cooking lady prepares meals for her? Reports cleaning lady weekly)  Ambulatory Assistance: Needs assistance (Reports ind ambulation with walker yet endorses falls at least twice/week at home \"for a while\")  Precautions:  Precautions  Medical Precautions: Fall precautions    Objective   Pain:  Pain Assessment  Pain Assessment: 0-10  Pain Score: 0 - No pain  Cognition:  Cognition  Overall Cognitive Status: Impaired  Orientation Level: Disoriented to time  Memory: Exceptions to WFL  Long-Term Memory: Impaired  Short-Term Memory: Impaired  Insight: Moderate  Impulsive: Mildly  Processing Speed: Delayed    General Assessments:  Activity Tolerance  Endurance: Decreased tolerance for upright activites    Sensation  Light Touch: No apparent deficits        Functional Assessments:  Bed Mobility  Bed Mobility: Yes  Bed Mobility 1  Bed Mobility 1: Scooting  Level of Assistance 1: Maximum assistance  Bed Mobility Comments 1: Both supine in bed with use of green sheet+bed tilt and at EOB sitting to get feet to floor. Cues on " technique  Bed Mobility 2  Bed Mobility  2: Supine to sitting  Level of Assistance 2: Minimum assistance  Bed Mobility Comments 2: assist with trunk up. Cues on technique  Bed Mobility 3  Bed Mobility 3: Sitting to supine  Level of Assistance 3: Maximum assistance  Bed Mobility Comments 3: assist for trunk down and BLE back into bed. Cues on technique    Transfers  Transfer: Yes  Transfer 1  Transfer From 1: Bed to  Transfer to 1: Stand  Technique 1: Sit to stand, Stand to sit  Transfer Device 1: Walker  Transfer Level of Assistance 1: Maximum assistance  Trials/Comments 1: x2 trials--tolerates standing ~2mins per trial. Heavy Max A for uprighting. Unable to fully erect and get hips alligned. Very posterior leaning    Ambulation/Gait Training  Ambulation/Gait Training Performed: Yes  Ambulation/Gait Training 1  Surface 1: Level tile  Device 1: Rolling walker  Assistance 1: Maximum assistance  Quality of Gait 1:  (shuffled, struggled to advance walker, unable to  feet from floor)  Comments/Distance (ft) 1: 1' sideways along EOB  Extremity/Trunk Assessments:  RLE   RLE : Exceptions to WFL  Strength RLE  RLE Overall Strength: Deficits  R Hip Flexion: 4-/5  R Knee Flexion: 2/5  R Knee Extension: 2/5  R Ankle Dorsiflexion: 2/5  R Ankle Plantar Flexion: 2/5  LLE   LLE : Exceptions to WFL  Strength LLE  LLE Overall Strength: Deficits  L Hip Flexion: 4-/5  L Knee Flexion: 2/5  L Knee Extension: 2/5  L Ankle Dorsiflexion: 2/5  L Ankle Plantar Flexion: 2/5  Outcome Measures:  [facility] Basic Mobility  Turning from your back to your side while in a flat bed without using bedrails: A lot  Moving from lying on your back to sitting on the side of a flat bed without using bedrails: A little  Moving to and from bed to chair (including a wheelchair): Total  Standing up from a chair using your arms (e.g. wheelchair or bedside chair): A lot  To walk in hospital room: Total  Climbing 3-5 steps with railing: Total  Basic Mobility -  Total Score: 10    Encounter Problems       Encounter Problems (Active)       Balance       Sitting Balance (Progressing)       Start:  04/11/24    Expected End:  04/25/24       Pt will demonstrate good sitting balance to promote safe engagement with out of bed activities           Standing Balance (Progressing)       Start:  04/11/24    Expected End:  04/25/24       Pt will demonstrate good static standing balance to promote safe participation with out of bed activity, transfers, and mobility              Mobility       Ambulation (Progressing)       Start:  04/11/24    Expected End:  04/25/24       Pt will ambulate 50' modified independent assist with walker to promote safe home mobility              PT Transfers       Supine to sit (Progressing)       Start:  04/11/24    Expected End:  04/25/24       Pt will transfer supine to sitting at edge of bed with modified independent assist to promote acute care out of bed activity           Sit to stand (Progressing)       Start:  04/11/24    Expected End:  04/25/24       Pt will transfer sit to standing position with modified independent assist and walker to promote safe out of bed activity           Bed to chair (Progressing)       Start:  04/11/24    Expected End:  04/25/24       Pt will transfer from sitting edge of bed to the chair with modified independent assist and walker to promote out of bed activity and reduce the risks of prolonged acute care bedrest              Pain - Adult          Safety       Safe Mobility Techniques (Progressing)       Start:  04/11/24    Expected End:  04/25/24       Pt will correctly identify and demonstrate safe mobility techniques to reduce their risks for falls during their acute care stay                   Education Documentation  Mobility Training, taught by Chris Mcclain, PT at 4/11/2024 11:09 AM.  Learner: Patient  Readiness: Acceptance  Method: Demonstration, Explanation  Response: Needs Reinforcement  Comment: safe  mobility techniques    Education Comments  No comments found.

## 2024-04-11 NOTE — H&P
History Of Present Illness  Carmen Cao is a 78 y.o. female presenting with difficulty ambulating.  This patient has a longstanding history of difficulty ambulating she normally uses a walker.  Recently she was started on Wellbutrin by her primary care physician to help with depression.  This happened 2 weeks ago when she was started on 75 mg which was then increased to 250 with this however the daughter noticed some jerking movements uncontrollable significantly worsened gait and multiple falls.  For that reason she was evaluated in the emergency room where CT of the head was negative.  The patient herself does not have any particular complaints she admits to some lower back pain which she says is the same for years she denies particular weakness she constantly has jerking movements especially in the lower extremities but does not seem to be bothered by that she received some Atarax in the emergency room which makes her little sleepy and not a very good historian.  There is no history of any other significant trauma loss of consciousness passing out focal weakness urine or bowel incontinence abdominal pain or any other particular symptoms     Past Medical History  She has a past medical history of Arthritis, CHF (congestive heart failure) (CMS/[facility]), and Depression.  Reviewed with her daughter  Surgical History  She has no past surgical history on file.  Reviewed  Social History  She reports that she has never smoked. She has never been exposed to tobacco smoke. She has never used smokeless tobacco. She reports that she does not drink alcohol and does not use drugs.  Reviewed  Family History  No family history on file.     Allergies  Ciprofloxacin and Adhesive    ROS  Review of Systems  She is limited historian but basically as above described in HPI section  Last Recorded Vitals  BP (!) 168/117   Pulse 81   Temp 36.7 °C (98 °F) (Oral)   Resp 18   Wt 89.9 kg (198 lb 3.1 oz)   SpO2 95%     Physical  Exam  Assessed patient emergency room bed 13 in the presence of her daughter.  This is a  female who is in no acute distress she is alert oriented x 3 cooperative very pleasant  Normocephalic atraumatic EOMI PERRLA  Neck supple  Chest clear bilaterally  Heart regular rate rhythm S1-S2 distant  Abdomen is obese soft nontender benign exam  Extremities there is no significant peripheral edema may be trace good pedal pulses bilaterally skin warm dry no rash  Neurologic examination shows normal speech normal cranial nerves motor strength actually is preserved 5 out of 5 for sensory is preserved there is no sensory level.  She was able to hold both legs for the count of 5 against gravity without much issue.  Gait was not checked but daughter showed me a video with her having a shuffling gait behind the walker.  Psych no psychosis  Skin no rash    Relevant Results  Potassium 3.3 otherwise CMP CBC okay CT head nothing acute UA pending EKG was not done    ASSESSMENT/PLAN  Assessment/Plan   Principal Problem:    General weakness  Active Problems:    Limb tremor    Impression is for:  1.  Gait abnormality shuffling gait multiple falls.  This is a longstanding problem that warrants further neurologic evaluation will check B12 TSH consult neurology order MRI brain and MRI lumbar spine PT OT eval  2.  Depression will wean off Wellbutrin after check with pharmacist it is recommended that this is weaned versus abruptly discontinued  3.  Full code       Isidro Lynne MD

## 2024-04-11 NOTE — CONSULTS
Inpatient consult to Neurology  Consult performed by: Mike Lima MD  Consult ordered by: Isidro Lynne MD      Chief complaints: Progressive gait abnormality with falls    History Of Present Illness  Carmen Cao is a 78 y.o. female with history significant for arthritis, depression, congestive heart failure, low back pain, depression who was brought into the ED after patient was noticed to have multiple falls with progressive gait ataxia with generalized weakness.  Patient is a poor historian and history is mainly obtained from the chart.  Patient however denies any new onset weakness, numbness, dizziness, speech or visual disturbances.  Loss of consciousness.     Past Medical History  She has a past medical history of Arthritis, CHF (congestive heart failure) (CMS/[facility]), and Depression.    Surgical History  She has no past surgical history on file.     Social History  She reports that she has never smoked. She has never been exposed to tobacco smoke. She has never used smokeless tobacco. She reports that she does not drink alcohol and does not use drugs.    Family history: No significant family history is noted    Allergies  Ciprofloxacin and Adhesive    Medications  Medications Prior to Admission   Medication Sig Dispense Refill Last Dose    buPROPion XL (Wellbutrin XL) 150 mg 24 hr tablet Take 1 tablet (150 mg) by mouth once daily in the morning for 7 days, THEN 2 tablets (300 mg) once daily in the morning for 21 days. Do not crush, chew, or split.. 49 tablet 0     furosemide (Lasix) 40 mg tablet Take 1 tablet (40 mg) by mouth once daily.       levothyroxine (Synthroid, Levoxyl) 50 mcg tablet Take 1 tablet (50 mcg) by mouth once daily in the morning. Take before meals. 90 tablet 3     melatonin 5 mg tablet Take 1 tablet (5 mg) by mouth once daily.       multivitamin tablet Take 1 tablet by mouth once daily. One-A-Day Women's 50+ Complete Multivitamin       PARoxetine (Paxil) 40 mg tablet Take  1 tablet (40 mg) by mouth once daily.       simvastatin (Zocor) 20 mg tablet Take 1 tablet (20 mg) by mouth once daily.        Review of Systems  14 point review of systems was reviewed and negative except as noted above.    Neurological Exam  Physical Exam  Mental Status :  Alert , O x 2  Attention and concentration normal    Speech : Clear , fluent  No aphasia or dysarthria noted    CN 2-12 :  Pupils round , regular reacting to light  Fundus could not be assessed  VA intact, EOM intact  Facial sensation intact  No facial asymmetry noted  Hearing acuity intact bilaterally  Tongue midline  Shoulder shrug intact    Motor:  Strength moves all extremities against gravity    Sensory:   Intact to light touch    Reflexes : 1+  symmetric with equivocal toes    Coordination : Intact to Finger to Nose    Gait : Unable to assess.      Last Recorded Vitals   Blood pressure 159/89, pulse 106, temperature 36.5 °C (97.7 °F), temperature source Temporal, resp. rate 20, height 1.524 m (5'), weight 89.9 kg (198 lb 3.1 oz), SpO2 93%.    Relevant Results  MR lumbar spine wo IV contrast    Result Date: 4/11/2024  Interpreted By:  Lyudmila Thompson, STUDY: MRI of the lumbar spine without IV contrast;  4/11/2024 5:39 pm   INDICATION: Signs/Symptoms:Back pain.   COMPARISON: None.   ACCESSION NUMBER(S): [id number]   ORDERING CLINICIAN: JUAN R ORTIZ   TECHNIQUE: Sagittal and axial STIR and T1-weighted MRI images of the lumbar spine were acquired using a spondylolysis protocol.  No contrast was administered.   FINDINGS: For counting purposes the last lumbarized vertebral body is labeled L5.   Alignment and vertebral body heights are maintained. Mild edema within the endplates at T12-L1 is likely degenerative.   Heterogeneous bone marrow signal without a focal lesion on the STIR sequence is nonspecific. T1 and T2 hyperintense lesion within the L1 vertebral body is compatible with a hemangioma.   There is desiccated disc signal  throughout the lumbar spine. There are mild degenerative endplate changes throughout the lumbar spine. Mild-to-moderate disc height loss at T12-L1 L2-L3 and L5-S1.   The conus terminates at L1 and is unremarkable. Prevertebral soft tissues are not thickened. There is mild fatty atrophy of the paraspinal muscles.   Evaluation by level:   T12-L1: Central disc protrusion and facet arthrosis. Mild spinal canal stenosis. No neural foraminal stenosis.   T12-L1: There is a disc extrusion which extends along the superior L1 vertebral body. Bilateral facet arthrosis. No spinal canal stenosis. Mild neural foraminal stenosis.   L1-L2: Disc bulge with a superimposed central disc extrusion which extends along the posterior L2 vertebral body. Mild spinal canal stenosis, narrowing of the subarticular recess and mild bilateral neural foraminal stenosis.   L2-L3: Disc bulge and facet arthrosis. No spinal canal stenosis, mild narrowing of the subarticular recess and mild bilateral neural foraminal stenosis.   L3-L4: Disc bulge and facet arthrosis. No spinal canal stenosis. Mild bilateral neural foraminal stenosis.   L4-L5: Disc bulge with a superimposed central disc protrusion and bilateral facet arthrosis. No spinal canal stenosis, mild narrowing of the subarticular recess, mild left and no right neural foraminal stenosis. There is abutment of the exiting left L4 nerve root. Please correlate clinically for symptoms of left L4 radiculopathy.   L5-S1: Disc bulge and facet arthrosis. No spinal canal stenosis. Mild bilateral neural foraminal stenosis.       Multilevel degenerative disc disease and facet arthrosis with varying degrees of mild spinal canal stenosis as detailed above. Mild-to-moderate neural foraminal narrowing as detailed above. There may be abutment of the exiting left L4 nerve root. Please correlate clinically for symptoms of left L4 radiculopathy.   I personally reviewed the images/study and I agree with the findings  as stated. This study was interpreted at [place], [place].   MACRO: None   Signed by: Lyudmila Thompson 4/11/2024 5:47 PM Dictation workstation:   [id number]    MR brain wo IV contrast    Result Date: 4/11/2024  Interpreted By:  Lyudmila Thompson, STUDY: MR BRAIN WO IV CONTRAST;  4/11/2024 5:24 pm   INDICATION: Signs/Symptoms:Rule out severe.   COMPARISON: None.   ACCESSION NUMBER(S): [id number]   ORDERING CLINICIAN: JUAN R ORTIZ   TECHNIQUE: Axial T2, FLAIR, DWI, gradient echo T2 and sagittal and coronal T1 weighted images of brain were acquired.   FINDINGS: CSF Spaces: The ventricles, sulci and basal cisterns are prominent compatible with age related involutional changes and moderate volume loss. There is no extra-axial fluid collection.   Parenchyma: There is no diffusion restriction abnormality to suggest acute infarct.  Mild degree of nonspecific subcortical and periventricular T2 and FLAIR hyperintense signal compatible with microangiopathy. Hyperintense signal within the basal ganglia and thalami may represent microangiopathy and or nonacute lacunar infarcts. There is no mass effect or midline shift. Cerebellar tonsils are above the foramen magnum. Pituitary and sella are not enlarged. No abnormal susceptibility artifact.   Paranasal Sinuses and Mastoids: Visualized paranasal sinuses and mastoid air cells are predominantly clear. Major intracranial flow voids at the skull base are unremarkable.       No evidence of acute infarct, intracranial mass effect or midline shift.   Moderate volume loss. Mild nonspecific white matter signal compatible with microangiopathy.   MACRO: None   Signed by: Lyudmila Thompson 4/11/2024 5:28 PM Dictation workstation:   [id number]    CT head wo IV contrast    Result Date: 4/10/2024  Interpreted By:  Kaleb Fisher, STUDY: CT HEAD WO IV CONTRAST;  4/10/2024 10:54 pm   INDICATION: Signs/Symptoms:Tremors.   COMPARISON: Noncontrast head CT of  08/25/2021.   ACCESSION NUMBER(S): [id number]   ORDERING CLINICIAN: THALIA BREWER   TECHNIQUE: Noncontrast axial CT scan of head was performed. Angled reformats in brain and bone windows were generated. The images were reviewed in bone, brain, blood and soft tissue windows.   FINDINGS: CSF Spaces: The ventricles, sulci and basal cisterns are within normal limits. There is no extraaxial fluid collection.   Parenchyma: Advanced volume loss.  There is periventricular and subcortical white matter hypoattenuation, most in keeping with chronic microvascular ischemic change. Scattered multifocal small hypodensities in basal ganglia regions and thalami compatible with chronic lacunar infarcts. The grey-white differentiation is intact. There is no mass effect or midline shift.  There is no intracranial hemorrhage.   Calvarium: The calvarium is unremarkable.   Paranasal sinuses and mastoids: Visualized paranasal sinuses and mastoids are clear.       No evidence of acute cortical infarct or intracranial hemorrhage.       MACRO: None   Signed by: Kaleb Fisher 4/10/2024 11:22 PM Dictation workstation:   [id number]     Results for orders placed or performed during the hospital encounter of 04/10/24 (from the past 24 hour(s))   CBC and Auto Differential   Result Value Ref Range    WBC 8.8 4.4 - 11.3 x10*3/uL    nRBC 0.0 0.0 - 0.0 /100 WBCs    RBC 4.51 4.00 - 5.20 x10*6/uL    Hemoglobin 12.7 12.0 - 16.0 g/dL    Hematocrit 38.7 36.0 - 46.0 %    MCV 86 80 - 100 fL    MCH 28.2 26.0 - 34.0 pg    MCHC 32.8 32.0 - 36.0 g/dL    RDW 13.8 11.5 - 14.5 %    Platelets 273 150 - 450 x10*3/uL    Neutrophils % 61.2 40.0 - 80.0 %    Immature Granulocytes %, Automated 0.5 0.0 - 0.9 %    Lymphocytes % 25.5 13.0 - 44.0 %    Monocytes % 10.0 2.0 - 10.0 %    Eosinophils % 2.0 0.0 - 6.0 %    Basophils % 0.8 0.0 - 2.0 %    Neutrophils Absolute 5.40 1.60 - 5.50 x10*3/uL    Immature Granulocytes Absolute, Automated 0.04 0.00 - 0.50 x10*3/uL    Lymphocytes  Absolute 2.25 0.80 - 3.00 x10*3/uL    Monocytes Absolute 0.88 (H) 0.05 - 0.80 x10*3/uL    Eosinophils Absolute 0.18 0.00 - 0.40 x10*3/uL    Basophils Absolute 0.07 0.00 - 0.10 x10*3/uL   Comprehensive metabolic panel   Result Value Ref Range    Glucose 110 (H) 65 - 99 mg/dL    Sodium 139 133 - 145 mmol/L    Potassium 3.3 (L) 3.4 - 5.1 mmol/L    Chloride 98 97 - 107 mmol/L    Bicarbonate 31 24 - 31 mmol/L    Urea Nitrogen 24 8 - 25 mg/dL    Creatinine 0.90 0.40 - 1.60 mg/dL    eGFR 65 >60 mL/min/1.73m*2    Calcium 9.3 8.5 - 10.4 mg/dL    Albumin 4.0 3.5 - 5.0 g/dL    Alkaline Phosphatase 84 35 - 125 U/L    Total Protein 7.2 5.9 - 7.9 g/dL    AST 39 5 - 40 U/L    Bilirubin, Total 0.4 0.1 - 1.2 mg/dL    ALT 34 5 - 40 U/L    Anion Gap 10 <=19 mmol/L   Magnesium   Result Value Ref Range    Magnesium 2.10 1.60 - 3.10 mg/dL   TSH with reflex to Free T4 if abnormal   Result Value Ref Range    Thyroid Stimulating Hormone 1.63 0.27 - 4.20 mIU/L   Vitamin B12   Result Value Ref Range    Vitamin B12 >2,000 (H) 211 - 946 pg/mL   CBC   Result Value Ref Range    WBC 7.5 4.4 - 11.3 x10*3/uL    nRBC 0.0 0.0 - 0.0 /100 WBCs    RBC 4.22 4.00 - 5.20 x10*6/uL    Hemoglobin 11.9 (L) 12.0 - 16.0 g/dL    Hematocrit 36.8 36.0 - 46.0 %    MCV 87 80 - 100 fL    MCH 28.2 26.0 - 34.0 pg    MCHC 32.3 32.0 - 36.0 g/dL    RDW 13.5 11.5 - 14.5 %    Platelets 226 150 - 450 x10*3/uL   Comprehensive metabolic panel   Result Value Ref Range    Glucose 97 65 - 99 mg/dL    Sodium 139 133 - 145 mmol/L    Potassium 3.0 (L) 3.4 - 5.1 mmol/L    Chloride 103 97 - 107 mmol/L    Bicarbonate 28 24 - 31 mmol/L    Urea Nitrogen 19 8 - 25 mg/dL    Creatinine 0.80 0.40 - 1.60 mg/dL    eGFR 75 >60 mL/min/1.73m*2    Calcium 8.7 8.5 - 10.4 mg/dL    Albumin 3.4 (L) 3.5 - 5.0 g/dL    Alkaline Phosphatase 76 35 - 125 U/L    Total Protein 6.2 5.9 - 7.9 g/dL    AST 41 (H) 5 - 40 U/L    Bilirubin, Total 0.6 0.1 - 1.2 mg/dL    ALT 31 5 - 40 U/L    Anion Gap 8 <=19 mmol/L          Assessment/Plan   Principal Problem:    General weakness  Active Problems:    Limb tremor  Carmen Cao is a 78 y.o. female with history significant for arthritis, depression, congestive heart failure, low back pain, depression who was brought into the ED after patient was noticed to have multiple falls with progressive gait ataxia with generalized weakness with MRI of the brain negative for acute abnormalities and MRI of the lumbar spine consistent with lumbar radiculopathy most likely contributing to her symptoms, stable    Recommendations;  Reviewed MRI Brain and CT scan brain and MRI of the L-spine results results.  MRI of the L-spine consistent with significant lumbar radiculopathy contributing to her symptoms  PT/OT eval and treatment  Spine surgery evaluation  Continue current management  Discussed the above plan with the patient and nurse   Will sign off for now.  Please call us back for any further questions.    Parts of this chart have been completed using voice recognition software. Please excuse any errors of transcription.

## 2024-04-11 NOTE — PROGRESS NOTES
04/11/24 1054   Discharge Planning   Living Arrangements Spouse/significant other   Support Systems Spouse/significant other;Children   Assistance Needed walker, daughter assists   Type of Residence Private residence   Do you have animals or pets at home? No   Who is requesting discharge planning? Provider   Home or Post Acute Services Post acute facilities (Rehab/SNF/etc)   Type of Post Acute Facility Services Skilled nursing   Patient expects to be discharged to: Skilled Rehab   Does the patient need discharge transport arranged? Yes   RoundTrip coordination needed? Yes   Has discharge transport been arranged? No   What day is the transport expected? 04/15/24   What time is the transport expected? 1200   Financial Resource Strain   How hard is it for you to pay for the very basics like food, housing, medical care, and heating? Pt Unable   Housing Stability   In the last 12 months, was there a time when you were not able to pay the mortgage or rent on time? Pt Unable   In the last 12 months, how many places have you lived? 1  (has been to rehab)   Transportation Needs   In the past 12 months, has lack of transportation kept you from medical appointments or from getting medications? no   In the past 12 months, has lack of transportation kept you from meetings, work, or from getting things needed for daily living? No   Patient Choice   Patient / Family choosing to utilize agency / facility established prior to hospitalization Yes  (Touring [facility] today.)       Spoke to patient, then called daughter, Thierno Norris, who is known to me.  Mom is with dad at home at this time.  Has been to rehab and A.[facility] in the past year.  Plan is for SNF upon discharge.  Thierno has been touring facilities and going to [employer] [employer] today at 1 pm.

## 2024-04-11 NOTE — PROGRESS NOTES
"Occupational Therapy    Evaluation    Patient Name: Carmen Cao  MRN: [id number]  Today's Date: 4/11/2024  Time Calculation  Start Time: 1245  Stop Time: 1304  Time Calculation (min): 19 min        Assessment:  OT Assessment: 79 yo female who recently began taking Welbutrin ~2 weeks ago presents with inability to ambulate d/t progressively worsening weakness and \"jerky movements\".  On eval, patient requires assist x 2 for transfers and minimal mobility as well as max assist overall for ADLs d/t impaired activity tolerance and generalized weakness.  OT to address deficits by providing ADL retraining utilizing compensatory techniques and progressive strengthening in order to maximize functional capacity and safety with mobility.  Prognosis: Good  Barriers to Discharge: None  Evaluation/Treatment Tolerance: Patient limited by fatigue  Medical Staff Made Aware: Yes  End of Session Communication: Bedside nurse  End of Session Patient Position: Bed, 3 rail up, Alarm on  OT Assessment Results: Decreased ADL status, Decreased safe judgment during ADL, Decreased endurance, Decreased functional mobility, Decreased IADLs, Decreased upper extremity strength  Prognosis: Good  Barriers to Discharge: None  Evaluation/Treatment Tolerance: Patient limited by fatigue  Medical Staff Made Aware: Yes  Barriers to Participation: Comorbidities    Plan:  Treatment Interventions: ADL retraining, Functional transfer training, UE strengthening/ROM, Endurance training, Patient/family training, Equipment evaluation/education, Compensatory technique education  OT Frequency: 3 times per week  OT Discharge Recommendations: Moderate intensity level of continued care  Equipment Recommended upon Discharge: Wheeled walker  OT Recommended Transfer Status: Assist of 2  OT - OK to Discharge: Yes  Treatment Interventions: ADL retraining, Functional transfer training, UE strengthening/ROM, Endurance training, Patient/family training, Equipment " "evaluation/education, Compensatory technique education    Subjective   Current Problem:  1. Generalized weakness        2. Limb tremor          General:  General  Reason for Referral: Impaired ADLs  Referred By: Dr Washington  Past Medical History Relevant to Rehab: HTN, obesity, OA, anxiety, depression, prediabetes  Family/Caregiver Present: No  Prior to Session Communication: Bedside nurse  Patient Position Received: Bed, 3 rail up, Alarm on  Preferred Learning Style: verbal, visual  General Comment: 77 yo female admitted with generalized weakness was cleared by nursing for therapy and agreeable to same.    Precautions:  Medical Precautions: Fall precautions    Pain:  Pain Assessment  Pain Assessment: 0-10  Pain Score: 0 - No pain      Objective     Cognition:  Overall Cognitive Status: Impaired  Orientation Level: Disoriented to situation  Cognition Comments: Tested grossly WFL but displays deficits with functional tasks - to monitor    Home Living:  Type of Home: House  Lives With: Spouse  Home Adaptive Equipment: Walker rolling or standard, Long-handled shoehorn  Home Layout: One level  Home Access: Stairs to enter with rails  Entrance Stairs-Rails: Right  Entrance Stairs-Number of Steps: 3  Bathroom Shower/Tub: Tub/shower unit  Bathroom Toilet: Standard  Bathroom Equipment: Grab bars in shower, Shower chair with back (grab bar next to toilet)    Prior Function:  Level of [place]: Needs assistance with ADLs, Needs assistance with homemaking  Receives Help From: Other (Comment) (Spouse, cleaning lady, and [facility] assist who does shopping, cooking, and laundry per patient)  ADL Assistance: Needs assistance (Spouse assists with bathing and tub transfer - otherwise indep per patient)  Ambulatory Assistance:  (uses a walker; endorses ~2 falls/week \"for a while\")  Prior Function Comments: Pt and spouse do not drive    ADL:  Eating Assistance: Stand by  Eating Deficit: Setup  Grooming Assistance: " Stand by  Grooming Deficit: Setup  Bathing Assistance: Moderate  Bathing Deficit: Perineal area, Buttocks, Right lower leg including foot, Left lower leg including foot  UE Dressing Assistance: Minimal  UE Dressing Deficit: Pull around back  LE Dressing Assistance: Total  Toileting Assistance with Device: Total  Toileting Deficit: Urinary Catheter  ADL Comments: all ADLs = per clinical judgement this date    Activity Tolerance:  Endurance: Decreased tolerance for upright activites    Bed Mobility/Transfers: Bed Mobility  Bed Mobility: Yes  Bed Mobility 1  Bed Mobility 1: Supine to sitting  Level of Assistance 1: Moderate assistance  Bed Mobility Comments 1: toward the right side of bed with head elevated ~40 degrees  Bed Mobility 2  Bed Mobility  2: Sitting to supine  Level of Assistance 2: Maximum assistance (x 2)    Transfers  Transfer: Yes  Transfer 1  Transfer From 1: Bed to  Transfer to 1: Stand  Technique 1: Sit to stand, Stand to sit  Transfer Device 1: Walker  Transfer Level of Assistance 1: Moderate assistance, +2  Trials/Comments 1: with hands on stabilized walker and increased time to achive upright stance    Functional Mobility:  Functional Mobility  Functional Mobility Performed: Yes (Mod assist x 2 to take ~6 small lateral steps with a 2 wheeled walker.  Assist required to advance the A.D. and increased time d/t effortful.)     Sensation:  Light Touch: No apparent deficits  Sensation Comment: Denies tingling/numbness    Strength:  Strength Comments: BUEs = ~3+/5 grossly    Hand Function:  Gross Grasp: Functional  Coordination: Functional    Extremities: RUE   RUE : Within Functional Limits and LUE   LUE: Within Functional Limits    Outcome Measures:[facility] Daily Activity  Putting on and taking off regular lower body clothing: Total  Bathing (including washing, rinsing, drying): A lot  Putting on and taking off regular upper body clothing: A little  Toileting, which includes using toilet, bedpan or  urinal: Total  Taking care of personal grooming such as brushing teeth: A little  Eating Meals: A little  Daily Activity - Total Score: 13    OP EDUCATION:  Education  Individual(s) Educated: Patient  Education Provided: Fall precautons, Risk and benefits of OT discussed with patient or other, POC discussed and agreed upon  Risk and Benefits Discussed with Patient/Caregiver/Other: yes  Patient/Caregiver Demonstrated Understanding: yes  Plan of Care Discussed and Agreed Upon: yes  Patient Response to Education: Patient/Caregiver Verbalized Understanding of Information    Goals:  Encounter Problems       Encounter Problems (Active)       OT Goals       ADLs (Progressing)       Start:  04/11/24    Expected End:  05/02/24       Pt will complete bathing, dressing, and toileting tasks with min assist using adaptive aides and with increased time as needed.         Functional transfers (Progressing)       Start:  04/11/24    Expected End:  05/02/24       Pt will complete toilet, bed, and chair transfers with close supervision from elevated seat heights and using bilateral arm supports.         Activity tolerance (Progressing)       Start:  04/11/24    Expected End:  05/02/24       Pt will participate in 25 minutes of therapeutic activity in order to increase functional endurance needed for ADLs.

## 2024-04-11 NOTE — PROGRESS NOTES
Carmen Cao is a 78 y.o. female on day 0 of admission presenting with General weakness.      Subjective   The patient reports she has not tried to ambulate since admission.  She reports she has been having some problem with her balance which is affected by her severe left knee arthritis pain.  She denies lower extremity numbness or weakness.  She reports she has intermittent involuntary movements of legs when laying in bed but she is not having symptoms today.  She denies back pain.       Objective     Last Recorded Vitals  /89 (BP Location: Left arm, Patient Position: Lying)   Pulse 106   Temp 36.5 °C (97.7 °F) (Temporal)   Resp 20   Wt 89.9 kg (198 lb 3.1 oz)   SpO2 93%   Intake/Output last 3 Shifts:    Intake/Output Summary (Last 24 hours) at 4/11/2024 1139  Last data filed at 4/11/2024 1136  Gross per 24 hour   Intake 950 ml   Output 200 ml   Net 750 ml       Admission Weight  Weight: 89.9 kg (198 lb 3.1 oz) (04/10/24 2120)    Daily Weight  04/10/24 : 89.9 kg (198 lb 3.1 oz)    Image Results  CT head wo IV contrast  Narrative: Interpreted By:  Kaleb Fisher,   STUDY:  CT HEAD WO IV CONTRAST;  4/10/2024 10:54 pm      INDICATION:  Signs/Symptoms:Tremors.      COMPARISON:  Noncontrast head CT of 08/25/2021.      ACCESSION NUMBER(S):  [id number]      ORDERING CLINICIAN:  THALIA BREWER      TECHNIQUE:  Noncontrast axial CT scan of head was performed. Angled reformats in  brain and bone windows were generated. The images were reviewed in  bone, brain, blood and soft tissue windows.      FINDINGS:  CSF Spaces: The ventricles, sulci and basal cisterns are within  normal limits. There is no extraaxial fluid collection.      Parenchyma: Advanced volume loss.  There is periventricular and  subcortical white matter hypoattenuation, most in keeping with  chronic microvascular ischemic change. Scattered multifocal small  hypodensities in basal ganglia regions and thalami compatible with  chronic lacunar  infarcts. The grey-white differentiation is intact.  There is no mass effect or midline shift.  There is no intracranial  hemorrhage.      Calvarium: The calvarium is unremarkable.      Paranasal sinuses and mastoids: Visualized paranasal sinuses and  mastoids are clear.      Impression: No evidence of acute cortical infarct or intracranial hemorrhage.              MACRO:  None      Signed by: Kaleb Fisher 4/10/2024 11:22 PM  Dictation workstation:   [id number]      Physical Exam  Constitutional:       Appearance: Normal appearance.   HENT:      Head: Normocephalic and atraumatic.      Nose: Nose normal.      Mouth/Throat:      Mouth: Mucous membranes are moist.      Pharynx: Oropharynx is clear.   Eyes:      Extraocular Movements: Extraocular movements intact.      Conjunctiva/sclera: Conjunctivae normal.      Pupils: Pupils are equal, round, and reactive to light.   Cardiovascular:      Rate and Rhythm: Normal rate and regular rhythm.      Pulses: Normal pulses.      Heart sounds: Normal heart sounds.   Pulmonary:      Effort: Pulmonary effort is normal.      Breath sounds: Normal breath sounds.   Abdominal:      General: Abdomen is flat. Bowel sounds are normal.      Palpations: Abdomen is soft.      Tenderness: There is no abdominal tenderness.   Musculoskeletal:         General: Normal range of motion.      Cervical back: Normal range of motion and neck supple.   Skin:     General: Skin is warm and dry.   Neurological:      General: No focal deficit present.      Mental Status: She is alert and oriented to person, place, and time.   Psychiatric:         Mood and Affect: Mood normal.         Behavior: Behavior normal.         Thought Content: Thought content normal.         Relevant Results               Assessment/Plan                  Principal Problem:    General weakness  Active Problems:    Limb tremor    She is admitted for further evaluation with MRI of thoracic and lumbar spine for possible radiculopathy  signs and symptoms.  She has no focal neurologic deficits on exam today.  Physical therapy and Occupational Therapy are consulted.  She may require short-term skilled nursing facility placement.  She may have had side effects of tremor and weakness from Wellbutrin, which is discontinued.  Plan for further evaluation and treatment per neurology.              José Sage MD

## 2024-04-11 NOTE — CARE PLAN
The patient's goals for the shift include      The clinical goals for the shift include        Problem: Skin  Goal: Prevent/manage excess moisture  Outcome: Progressing     Problem: Skin  Goal: Prevent/minimize sheer/friction injuries  Outcome: Progressing     Problem: Skin  Goal: Promote skin healing  Outcome: Progressing     Problem: Safety - Adult  Goal: Free from fall injury  Outcome: Progressing     Problem: Fall/Injury  Goal: Not fall by end of shift  Outcome: Progressing

## 2024-04-11 NOTE — CARE PLAN
Problem: Skin  Goal: Decreased wound size/increased tissue granulation at next dressing change  Outcome: Progressing  Goal: Participates in plan/prevention/treatment measures  Outcome: Progressing  Goal: Prevent/manage excess moisture  Outcome: Progressing  Goal: Prevent/minimize sheer/friction injuries  Outcome: Progressing  Goal: Promote/optimize nutrition  Outcome: Progressing  Goal: Promote skin healing  Outcome: Progressing     Problem: Pain - Adult  Goal: Verbalizes/displays adequate comfort level or baseline comfort level  Outcome: Progressing     Problem: Safety - Adult  Goal: Free from fall injury  Outcome: Progressing     Problem: Discharge Planning  Goal: Discharge to home or other facility with appropriate resources  Outcome: Progressing     Problem: Chronic Conditions and Co-morbidities  Goal: Patient's chronic conditions and co-morbidity symptoms are monitored and maintained or improved  Outcome: Progressing     Problem: Fall/Injury  Goal: Not fall by end of shift  Outcome: Progressing  Goal: Be free from injury by end of the shift  Outcome: Progressing  Goal: Verbalize understanding of personal risk factors for fall in the hospital  Outcome: Progressing  Goal: Verbalize understanding of risk factor reduction measures to prevent injury from fall in the home  Outcome: Progressing  Goal: Use assistive devices by end of the shift  Outcome: Progressing  Goal: Pace activities to prevent fatigue by end of the shift  Outcome: Progressing   The patient's goals for the shift include      The clinical goals for the shift include safety awareness

## 2024-04-11 NOTE — ED PROVIDER NOTES
HPI   Chief Complaint   Patient presents with    Weakness, Gen     Patient brought in by EMS from home for increased general weakness, left knee pain, failure to thrive and some mild confusion. She lives at home with her . She denies any pain at the moment but does not recall if she took anything for her pain.       HPI  78-year-old female presents by EMS from home with complaint of increased weakness, left knee pain, leg tremors.  The patient had leg tremors for a while but is progressed over the last couple weeks.  The patient had been started on Wellbutrin for her dementia her doctor recommended stopping that which they did today.  Daughter noted that she was too weak to get up and is not able to care for self at home due to the progressive weakness, left leg pain and increased tremors of her legs.  She has not been evaluated by neurology for these tremors.  They thought it was just restless leg but is getting worse and now affecting her ability to care for self.  Patient denies any recent illness, fevers, chills, chest pain, shortness of breath or any other complaints.                  No data recorded                   Patient History   Past Medical History:   Diagnosis Date    Arthritis     left knee    CHF (congestive heart failure) (CMS/Formerly [facility])     Depression      History reviewed. No pertinent surgical history.  No family history on file.  Social History     Tobacco Use    Smoking status: Never     Passive exposure: Never    Smokeless tobacco: Never   Vaping Use    Vaping status: Never Used   Substance Use Topics    Alcohol use: Never    Drug use: Never       Physical Exam   ED Triage Vitals [04/10/24 2120]   Temperature Heart Rate Respirations BP   36.7 °C (98 °F) 81 18 (!) 168/117      Pulse Ox Temp Source Heart Rate Source Patient Position   95 % Oral -- --      BP Location FiO2 (%)     -- --       Physical Exam  General:  Awake, alert, no acute distress.  Head: Normocephalic, Atraumatic  Neck:  Supple, trachea midline, no stridor  Skin: Warm and dry, no rashes   Lungs: Clear to auscultation bilaterally no acute respiratory distress, speaking in full sentences without difficulty  CV: Regular Rate Rhythm with no obvious murmurs gallops rubs noted, no jugular venous distention, no pedal edema   Abdomen: Soft, nontender, nondistended, positive bowel sounds, no peritoneal signs  Neuro:  No gross focal neurologic deficits, NIH is 0.  Tremors of bilateral lower extremities  Musculoskeletal:  Full range of motion in all 4 extremities  Psychiatric:  Alert oriented x 3, Good insight into condition.      ED Course & MDM   Diagnoses as of 04/11/24 0053   Generalized weakness   Limb tremor       Medical Decision Making  Patient's workup in the emergency room is unremarkable.  She was treated with hydroxyzine for her tremors.  This seems to be a progressing issue for her but now it is affecting her ability to care for self and she lives at home alone with her elderly .  At this point I felt that she would benefit from admission for further evaluation regarding her weakness and increased tremors.  Discussed the findings with the patient's workup with the patient and daughter at bedside.  Contact the hospitalist for admission.    Procedure  Procedures     Gordon Da Silva DO  04/11/24 0056

## 2024-04-12 PROBLEM — R53.1 GENERALIZED WEAKNESS: Status: ACTIVE | Noted: 2024-04-12

## 2024-04-12 PROCEDURE — 2500000002 HC RX 250 W HCPCS SELF ADMINISTERED DRUGS (ALT 637 FOR MEDICARE OP, ALT 636 FOR OP/ED): Performed by: INTERNAL MEDICINE

## 2024-04-12 PROCEDURE — 97110 THERAPEUTIC EXERCISES: CPT | Mod: GP,CQ

## 2024-04-12 PROCEDURE — 2500000002 HC RX 250 W HCPCS SELF ADMINISTERED DRUGS (ALT 637 FOR MEDICARE OP, ALT 636 FOR OP/ED): Mod: MUE | Performed by: INTERNAL MEDICINE

## 2024-04-12 PROCEDURE — 97116 GAIT TRAINING THERAPY: CPT | Mod: GP,CQ

## 2024-04-12 PROCEDURE — 97535 SELF CARE MNGMENT TRAINING: CPT | Mod: GO,CO

## 2024-04-12 PROCEDURE — 1100000001 HC PRIVATE ROOM DAILY

## 2024-04-12 PROCEDURE — 97110 THERAPEUTIC EXERCISES: CPT | Mod: GO,CO

## 2024-04-12 PROCEDURE — 2500000001 HC RX 250 WO HCPCS SELF ADMINISTERED DRUGS (ALT 637 FOR MEDICARE OP): Performed by: INTERNAL MEDICINE

## 2024-04-12 RX ADMIN — FUROSEMIDE 40 MG: 40 TABLET ORAL at 09:26

## 2024-04-12 RX ADMIN — LEVOTHYROXINE SODIUM 50 MCG: 0.05 TABLET ORAL at 06:37

## 2024-04-12 RX ADMIN — Medication 5 MG: at 21:47

## 2024-04-12 RX ADMIN — MULTIVITAMIN TABLET 1 TABLET: TABLET at 09:26

## 2024-04-12 RX ADMIN — PAROXETINE HYDROCHLORIDE 40 MG: 20 TABLET, FILM COATED ORAL at 09:26

## 2024-04-12 RX ADMIN — NYSTATIN 1 APPLICATION: 100000 POWDER TOPICAL at 09:26

## 2024-04-12 RX ADMIN — TIZANIDINE 2 MG: 2 TABLET ORAL at 21:47

## 2024-04-12 RX ADMIN — NYSTATIN 1 APPLICATION: 100000 POWDER TOPICAL at 21:45

## 2024-04-12 RX ADMIN — PANTOPRAZOLE SODIUM 40 MG: 40 TABLET, DELAYED RELEASE ORAL at 06:37

## 2024-04-12 RX ADMIN — SIMVASTATIN 20 MG: 20 TABLET, FILM COATED ORAL at 09:25

## 2024-04-12 SDOH — ECONOMIC STABILITY: INCOME INSECURITY: IN THE PAST 12 MONTHS, HAS THE ELECTRIC, GAS, OIL, OR WATER COMPANY THREATENED TO SHUT OFF SERVICE IN YOUR HOME?: NO

## 2024-04-12 SDOH — HEALTH STABILITY: MENTAL HEALTH
STRESS IS WHEN SOMEONE FEELS TENSE, NERVOUS, ANXIOUS, OR CAN'T SLEEP AT NIGHT BECAUSE THEIR MIND IS TROUBLED. HOW STRESSED ARE YOU?: NOT AT ALL

## 2024-04-12 SDOH — SOCIAL STABILITY: SOCIAL NETWORK
IN A TYPICAL WEEK, HOW MANY TIMES DO YOU TALK ON THE PHONE WITH FAMILY, FRIENDS, OR NEIGHBORS?: MORE THAN THREE TIMES A WEEK

## 2024-04-12 SDOH — ECONOMIC STABILITY: FOOD INSECURITY: WITHIN THE PAST 12 MONTHS, YOU WORRIED THAT YOUR FOOD WOULD RUN OUT BEFORE YOU GOT MONEY TO BUY MORE.: NEVER TRUE

## 2024-04-12 SDOH — SOCIAL STABILITY: SOCIAL INSECURITY
WITHIN THE LAST YEAR, HAVE TO BEEN RAPED OR FORCED TO HAVE ANY KIND OF SEXUAL ACTIVITY BY YOUR PARTNER OR EX-PARTNER?: NO

## 2024-04-12 SDOH — SOCIAL STABILITY: SOCIAL NETWORK: HOW OFTEN DO YOU GET TOGETHER WITH FRIENDS OR RELATIVES?: MORE THAN THREE TIMES A WEEK

## 2024-04-12 SDOH — SOCIAL STABILITY: SOCIAL NETWORK: ARE YOU MARRIED, WIDOWED, DIVORCED, SEPARATED, NEVER MARRIED, OR LIVING WITH A PARTNER?: MARRIED

## 2024-04-12 SDOH — SOCIAL STABILITY: SOCIAL INSECURITY
WITHIN THE LAST YEAR, HAVE YOU BEEN KICKED, HIT, SLAPPED, OR OTHERWISE PHYSICALLY HURT BY YOUR PARTNER OR EX-PARTNER?: NO

## 2024-04-12 SDOH — HEALTH STABILITY: PHYSICAL HEALTH: ON AVERAGE, HOW MANY MINUTES DO YOU ENGAGE IN EXERCISE AT THIS LEVEL?: 0 MIN

## 2024-04-12 SDOH — SOCIAL STABILITY: SOCIAL INSECURITY: WITHIN THE LAST YEAR, HAVE YOU BEEN HUMILIATED OR EMOTIONALLY ABUSED IN OTHER WAYS BY YOUR PARTNER OR EX-PARTNER?: NO

## 2024-04-12 SDOH — HEALTH STABILITY: PHYSICAL HEALTH: ON AVERAGE, HOW MANY DAYS PER WEEK DO YOU ENGAGE IN MODERATE TO STRENUOUS EXERCISE (LIKE A BRISK WALK)?: 0 DAYS

## 2024-04-12 SDOH — SOCIAL STABILITY: SOCIAL INSECURITY: WITHIN THE LAST YEAR, HAVE YOU BEEN AFRAID OF YOUR PARTNER OR EX-PARTNER?: NO

## 2024-04-12 SDOH — HEALTH STABILITY: MENTAL HEALTH
HOW OFTEN DO YOU NEED TO HAVE SOMEONE HELP YOU WHEN YOU READ INSTRUCTIONS, PAMPHLETS, OR OTHER WRITTEN MATERIAL FROM YOUR DOCTOR OR PHARMACY?: SOMETIMES

## 2024-04-12 SDOH — SOCIAL STABILITY: SOCIAL NETWORK
DO YOU BELONG TO ANY CLUBS OR ORGANIZATIONS SUCH AS CHURCH GROUPS UNIONS, FRATERNAL OR ATHLETIC GROUPS, OR SCHOOL GROUPS?: PATIENT DECLINED

## 2024-04-12 SDOH — ECONOMIC STABILITY: FOOD INSECURITY: WITHIN THE PAST 12 MONTHS, THE FOOD YOU BOUGHT JUST DIDN'T LAST AND YOU DIDN'T HAVE MONEY TO GET MORE.: NEVER TRUE

## 2024-04-12 SDOH — ECONOMIC STABILITY: HOUSING INSECURITY
IN THE LAST 12 MONTHS, WAS THERE A TIME WHEN YOU DID NOT HAVE A STEADY PLACE TO SLEEP OR SLEPT IN A SHELTER (INCLUDING NOW)?: NO

## 2024-04-12 SDOH — SOCIAL STABILITY: SOCIAL NETWORK: HOW OFTEN DO YOU ATTENT MEETINGS OF THE CLUB OR ORGANIZATION YOU BELONG TO?: NEVER

## 2024-04-12 SDOH — SOCIAL STABILITY: SOCIAL NETWORK: HOW OFTEN DO YOU ATTEND CHURCH OR RELIGIOUS SERVICES?: PATIENT DECLINED

## 2024-04-12 ASSESSMENT — COGNITIVE AND FUNCTIONAL STATUS - GENERAL
TURNING FROM BACK TO SIDE WHILE IN FLAT BAD: A LITTLE
DRESSING REGULAR LOWER BODY CLOTHING: A LITTLE
PERSONAL GROOMING: A LITTLE
TOILETING: A LITTLE
MOVING TO AND FROM BED TO CHAIR: A LITTLE
DAILY ACTIVITIY SCORE: 19
STANDING UP FROM CHAIR USING ARMS: A LOT
WALKING IN HOSPITAL ROOM: A LITTLE
HELP NEEDED FOR BATHING: A LITTLE
MOBILITY SCORE: 13
PERSONAL GROOMING: A LITTLE
MOBILITY SCORE: 18
TOILETING: A LITTLE
DRESSING REGULAR LOWER BODY CLOTHING: A LITTLE
TURNING FROM BACK TO SIDE WHILE IN FLAT BAD: A LOT
DAILY ACTIVITIY SCORE: 19
MOBILITY SCORE: 13
MOVING TO AND FROM BED TO CHAIR: A LOT
DAILY ACTIVITIY SCORE: 19
DRESSING REGULAR LOWER BODY CLOTHING: A LITTLE
STANDING UP FROM CHAIR USING ARMS: A LOT
MOVING FROM LYING ON BACK TO SITTING ON SIDE OF FLAT BED WITH BEDRAILS: A LITTLE
DRESSING REGULAR UPPER BODY CLOTHING: A LITTLE
DRESSING REGULAR UPPER BODY CLOTHING: A LITTLE
HELP NEEDED FOR BATHING: A LITTLE
DRESSING REGULAR UPPER BODY CLOTHING: A LITTLE
CLIMB 3 TO 5 STEPS WITH RAILING: A LOT
TOILETING: A LITTLE
PERSONAL GROOMING: A LITTLE
MOVING FROM LYING ON BACK TO SITTING ON SIDE OF FLAT BED WITH BEDRAILS: A LITTLE
HELP NEEDED FOR BATHING: A LITTLE
WALKING IN HOSPITAL ROOM: A LOT
CLIMB 3 TO 5 STEPS WITH RAILING: A LOT
CLIMB 3 TO 5 STEPS WITH RAILING: A LOT
STANDING UP FROM CHAIR USING ARMS: A LITTLE
MOVING TO AND FROM BED TO CHAIR: A LOT
WALKING IN HOSPITAL ROOM: A LOT
TURNING FROM BACK TO SIDE WHILE IN FLAT BAD: A LOT

## 2024-04-12 ASSESSMENT — PAIN SCALES - GENERAL
PAINLEVEL_OUTOF10: 3
PAINLEVEL_OUTOF10: 0 - NO PAIN

## 2024-04-12 ASSESSMENT — PAIN - FUNCTIONAL ASSESSMENT
PAIN_FUNCTIONAL_ASSESSMENT: 0-10

## 2024-04-12 NOTE — PROGRESS NOTES
04/12/24 0824   Discharge Planning   Living Arrangements Spouse/significant other   Support Systems Spouse/significant other   Assistance Needed walker, daughter assists   Type of Residence Private residence   Number of Stairs to Enter Residence 3   Number of Stairs Within Residence 0   Do you have animals or pets at home? No   Who is requesting discharge planning? Provider   Home or Post Acute Services Post acute facilities (Rehab/SNF/etc)   Type of Post Acute Facility Services Skilled nursing   Patient expects to be discharged to: skilled Rehab - [facility]   Does the patient need discharge transport arranged? Yes   RoundTrip coordination needed? Yes   Has discharge transport been arranged? No   Patient Choice   Provider Choice list and CMS website (https://medicare.gov/care-compare#search) for post-acute Quality and Resource Measure Data were provided and reviewed with: Family   Patient / Family choosing to utilize agency / facility established prior to hospitalization Yes       Accepted by [facility] - precert started today.

## 2024-04-12 NOTE — CARE PLAN
Problem: Skin  Goal: Decreased wound size/increased tissue granulation at next dressing change  Outcome: Progressing  Goal: Participates in plan/prevention/treatment measures  Outcome: Progressing  Goal: Prevent/manage excess moisture  Outcome: Progressing  Goal: Prevent/minimize sheer/friction injuries  Outcome: Progressing  Goal: Promote/optimize nutrition  Outcome: Progressing  Goal: Promote skin healing  Outcome: Progressing     Problem: Pain - Adult  Goal: Verbalizes/displays adequate comfort level or baseline comfort level  Outcome: Progressing     Problem: Safety - Adult  Goal: Free from fall injury  Outcome: Progressing     Problem: Discharge Planning  Goal: Discharge to home or other facility with appropriate resources  Outcome: Progressing     Problem: Chronic Conditions and Co-morbidities  Goal: Patient's chronic conditions and co-morbidity symptoms are monitored and maintained or improved  Outcome: Progressing     Problem: Fall/Injury  Goal: Not fall by end of shift  Outcome: Progressing  Goal: Be free from injury by end of the shift  Outcome: Progressing  Goal: Verbalize understanding of personal risk factors for fall in the hospital  Outcome: Progressing  Goal: Verbalize understanding of risk factor reduction measures to prevent injury from fall in the home  Outcome: Progressing  Goal: Use assistive devices by end of the shift  Outcome: Progressing  Goal: Pace activities to prevent fatigue by end of the shift  Outcome: Progressing   The patient's goals for the shift include      The clinical goals for the shift include safety

## 2024-04-12 NOTE — PROGRESS NOTES
Physical Therapy    Physical Therapy Treatment    Patient Name: Carmen Cao  MRN: [id number]  Today's Date: 4/12/2024  Time Calculation  Start Time: 0930  Stop Time: 1000  Time Calculation (min): 30 min       Assessment/Plan   PT Assessment  PT Assessment Results: Decreased strength, Decreased endurance, Decreased mobility, Impaired judgement, Decreased cognition, Decreased coordination, Decreased safety awareness  Rehab Prognosis: Fair  Medical Staff Made Aware: Yes  End of Session Communication: Bedside nurse  Assessment Comment: Gait 12' x 2 with Min A x 2 Mod A x 2 transfers c/o L thigh pain with ther/ transfers  End of Session Patient Position: Up in chair, Alarm on     PT Plan  Treatment/Interventions: Bed mobility, Transfer training, Gait training, Therapeutic exercise  PT Plan: Skilled PT  PT Frequency: 5 times per week  PT Discharge Recommendations: Moderate intensity level of continued care  Equipment Recommended upon Discharge: Wheeled walker  PT Recommended Transfer Status: Assist x2, Assistive device  PT - OK to Discharge: Yes      General Visit Information:   PT  Visit  PT Received On: 04/12/24  General  Reason for Referral: Impaired mobilty  Referred By: Dr Washington  Past Medical History Relevant to Rehab: HTN, obesity, OA, anxiety, depression, prediabetes  Prior to Session Communication: Bedside nurse  Patient Position Received: Bed, 3 rail up, Alarm on  Preferred Learning Style: verbal, visual  General Comment: Cleared by nurse to see.Patient agreeable    Subjective   Precautions:  Precautions  Medical Precautions: Fall precautions  Vital Signs:       Objective   Pain:  Pain Assessment  Pain Assessment: 0-10  Pain Score: 0 - No pain  Pain Location: Leg  Pain Orientation: Left (THIGH with mobility)  Cognition:  Cognition  Overall Cognitive Status: Impaired  Orientation Level: Disoriented to time, Disoriented to situation  Memory: Exceptions to WFL  Long-Term Memory: Impaired  Short-Term Memory:  Impaired  Insight: Moderate  Impulsive: Mildly  Processing Speed: Delayed  Postural Control:     Extremity/Trunk Assessments:    Activity Tolerance:  Activity Tolerance  Endurance: Decreased tolerance for upright activites  Treatments:  Therapeutic Exercise  Therapeutic Exercise Performed: Yes  Therapeutic Exercise Activity 1: B LE seated ther ex: heel and toe raises, russell hip adduction, LAQs, hip flexion, resisitve hip abduction x 15    Bed Mobility  Bed Mobility: Yes  Bed Mobility 1  Bed Mobility 1: Supine to sitting  Level of Assistance 1: Moderate assistance  Bed Mobility Comments 1: HOB elevated Mod A for trunk up and B LE over the EOB Draw sheet assist to get B feet to the floor    Ambulation/Gait Training  Ambulation/Gait Training Performed: Yes  Ambulation/Gait Training 1  Surface 1: Level tile  Device 1: Rolling walker  Assistance 1: Minimum assistance (x 2)  Quality of Gait 1: Shuffling gait, Forward flexed posture (Posture lean)  Comments/Distance (ft) 1: 12'x 2 with RW with Min A x 2 with small, slow shuffled steps with posteriot lean Seated rest between walks  Transfers  Transfer: Yes  Transfer 1  Transfer From 1: Bed to  Transfer to 1: Stand  Technique 1: Sit to stand  Transfer Device 1: Walker  Transfer Level of Assistance 1: Moderate assistance, +2  Trials/Comments 1: x 1 STS from bed with Mod A x 2 with posture lean Cues roblero upright posture c/o L thigh pain  Transfers 2  Transfer From 2: Stand to  Transfer to 2: Sit, Chair with arms  Technique 2: Stand to sit  Transfer Device 2: Walker  Transfer Level of Assistance 2: Moderate assistance, +2  Trials/Comments 2: x 2 trail with Mod A x 2 with cues for safe hand placement with posterior lean c/o L thigh pain    Outcome Measures:  [facility] Basic Mobility  Turning from your back to your side while in a flat bed without using bedrails: A little  Moving from lying on your back to sitting on the side of a flat bed without using bedrails: A lot  Moving to and  from bed to chair (including a wheelchair): A lot  Standing up from a chair using your arms (e.g. wheelchair or bedside chair): A lot  To walk in hospital room: A lot  Climbing 3-5 steps with railing: A lot  Basic Mobility - Total Score: 13    Education Documentation  Mobility Training, taught by Bre Lazaro PTA at 4/12/2024 10:09 AM.  Learner: Patient  Readiness: Acceptance  Method: Explanation, Teach-back  Response: Verbalizes Understanding, Needs Reinforcement    Education Comments  No comments found.        OP EDUCATION:       Encounter Problems       Encounter Problems (Active)       Balance       Sitting Balance (Progressing)       Start:  04/11/24    Expected End:  04/25/24       Pt will demonstrate good sitting balance to promote safe engagement with out of bed activities           Standing Balance (Progressing)       Start:  04/11/24    Expected End:  04/25/24       Pt will demonstrate good static standing balance to promote safe participation with out of bed activity, transfers, and mobility              Mobility       Ambulation (Progressing)       Start:  04/11/24    Expected End:  04/25/24       Pt will ambulate 50' modified independent assist with walker to promote safe home mobility              PT Transfers       Supine to sit (Progressing)       Start:  04/11/24    Expected End:  04/25/24       Pt will transfer supine to sitting at edge of bed with modified independent assist to promote acute care out of bed activity           Sit to stand (Progressing)       Start:  04/11/24    Expected End:  04/25/24       Pt will transfer sit to standing position with modified independent assist and walker to promote safe out of bed activity           Bed to chair (Progressing)       Start:  04/11/24    Expected End:  04/25/24       Pt will transfer from sitting edge of bed to the chair with modified independent assist and walker to promote out of bed activity and reduce the risks of prolonged acute care  bedrest              Pain - Adult          Safety       Safe Mobility Techniques (Progressing)       Start:  04/11/24    Expected End:  04/25/24       Pt will correctly identify and demonstrate safe mobility techniques to reduce their risks for falls during their acute care stay

## 2024-04-12 NOTE — PROGRESS NOTES
Occupational Therapy    OT Treatment    Patient Name: Carmen Cao  MRN: [id number]  Today's Date: 4/12/2024  Time Calculation  Start Time: 1310  Stop Time: 1335  Time Calculation (min): 25 min         Assessment:  End of Session Patient Position: Up in chair, Alarm on (all needs in reach)  OT Assessment Results: Decreased ADL status, Decreased safe judgment during ADL, Decreased endurance, Decreased functional mobility, Decreased IADLs, Decreased upper extremity strength  Plan:  Treatment Interventions: ADL retraining, UE strengthening/ROM  OT Frequency: 3 times per week  OT Discharge Recommendations: Moderate intensity level of continued care  Equipment Recommended upon Discharge: Wheeled walker  OT Recommended Transfer Status: Assist of 2  OT - OK to Discharge: Yes  Treatment Interventions: ADL retraining, UE strengthening/ROM    Subjective   Previous Visit Info:  OT Last Visit  OT Received On: 04/12/24  General:  General  Referred By: Dr Washington  Past Medical History Relevant to Rehab: HTN, obesity, OA, anxiety, depression, prediabetes  Prior to Session Communication: Bedside nurse  Patient Position Received: Up in chair  General Comment: Agreeable to treatment  Precautions:  Medical Precautions: Fall precautions  Pain:  Pain Assessment  Pain Assessment: 0-10  Pain Score: 3  Pain Location: Leg  Pain Orientation: Left, Upper    Objective    Activities of Daily Living: LE Dressing  LE Dressing Adaptive Equipment: Reacher, Sock aide  Sock Level of Assistance: Close supervision, Setup  LE Dressing Where Assessed: Chair  LE Dressing Comments: pt doffed/donned socks utilizing AE, therapist instructs in purpose/benefits, and effective use of AE to ease task due to L leg pain with attempts. Therapist provides handout for purchase info.     Therapy/Activity: Therapeutic Exercise  Therapeutic Exercise Activity 1: scap protraction/retraction  Therapeutic Exercise Activity 2: shoulder flexion  Therapeutic Exercise  Activity 3: shoulder abd/add  1 set x 15 reps, AROM with dowel anna. Pt presents with fair form, therapist demo for correct joint alignment.    Outcome Measures:[facility] Daily Activity  Putting on and taking off regular lower body clothing: A little  Bathing (including washing, rinsing, drying): A little  Putting on and taking off regular upper body clothing: A little  Toileting, which includes using toilet, bedpan or urinal: A little  Taking care of personal grooming such as brushing teeth: A little  Eating Meals: None  Daily Activity - Total Score: 19        Education Documentation  Home Exercise Program, taught by MARTINE Horowitz at 4/12/2024  3:11 PM.  Learner: Patient  Readiness: Acceptance  Method: Explanation, Demonstration, Handout  Response: Verbalizes Understanding, Demonstrated Understanding    ADL Training, taught by MARTINE Horowitz at 4/12/2024  3:11 PM.  Learner: Patient  Readiness: Acceptance  Method: Explanation, Demonstration, Handout  Response: Verbalizes Understanding, Demonstrated Understanding    Education Comments  No comments found.    IP EDUCATION:  Education  Individual(s) Educated: Patient  Equipment:  (sock aid. reacher)  Patient Response to Education: Patient/Caregiver Verbalized Understanding of Information, Patient/Caregiver Performed Return Demonstration of Exercises/Activities    Goals:  Encounter Problems       Encounter Problems (Active)       OT Goals       ADLs (Progressing)       Start:  04/11/24    Expected End:  05/02/24       Pt will complete bathing, dressing, and toileting tasks with min assist using adaptive aides and with increased time as needed.         Functional transfers (Progressing)       Start:  04/11/24    Expected End:  05/02/24       Pt will complete toilet, bed, and chair transfers with close supervision from elevated seat heights and using bilateral arm supports.         Activity tolerance (Progressing)       Start:  04/11/24    Expected End:  05/02/24        Pt will participate in 25 minutes of therapeutic activity in order to increase functional endurance needed for ADLs.

## 2024-04-12 NOTE — CARE PLAN
Problem: Skin  Goal: Decreased wound size/increased tissue granulation at next dressing change  Outcome: Progressing  Goal: Participates in plan/prevention/treatment measures  Outcome: Progressing  Goal: Prevent/manage excess moisture  Outcome: Progressing  Flowsheets (Taken 4/11/2024 2245)  Prevent/manage excess moisture:   Cleanse incontinence/protect with barrier cream   Moisturize dry skin   Monitor for/manage infection if present  Goal: Prevent/minimize sheer/friction injuries  Outcome: Progressing  Goal: Promote/optimize nutrition  Outcome: Progressing  Goal: Promote skin healing  Outcome: Progressing     Problem: Pain - Adult  Goal: Verbalizes/displays adequate comfort level or baseline comfort level  Outcome: Progressing     Problem: Safety - Adult  Goal: Free from fall injury  Outcome: Progressing     Problem: Discharge Planning  Goal: Discharge to home or other facility with appropriate resources  Outcome: Progressing     Problem: Chronic Conditions and Co-morbidities  Goal: Patient's chronic conditions and co-morbidity symptoms are monitored and maintained or improved  Outcome: Progressing     Problem: Fall/Injury  Goal: Not fall by end of shift  Outcome: Progressing  Goal: Be free from injury by end of the shift  Outcome: Progressing  Goal: Verbalize understanding of personal risk factors for fall in the hospital  Outcome: Progressing  Goal: Verbalize understanding of risk factor reduction measures to prevent injury from fall in the home  Outcome: Progressing  Goal: Use assistive devices by end of the shift  Outcome: Progressing  Goal: Pace activities to prevent fatigue by end of the shift  Outcome: Progressing   The patient's goals for the shift include      The clinical goals for the shift include safety    Over the shift, the patient did not make progress toward the following goals. Barriers to progression include pt forgetful. Recommendations to address these barriers include frequent  reorientation, reminders, use of bed alarm.

## 2024-04-12 NOTE — DISCHARGE SUMMARY
Discharge Diagnosis  General weakness    Issues Requiring Follow-Up  Monitor for continued improvement in neurologic side effects from Wellbutrin at skilled nursing facility.    Discharge Meds     Your medication list        CONTINUE taking these medications        Instructions Last Dose Given Next Dose Due   furosemide 40 mg tablet  Commonly known as: Lasix      Take 1 tablet (40 mg) by mouth once daily.       levothyroxine 50 mcg tablet  Commonly known as: Synthroid, Levoxyl      Take 1 tablet (50 mcg) by mouth once daily in the morning. Take before meals.       melatonin 5 mg tablet           multivitamin tablet      Take 1 tablet by mouth once daily. One-A-Day Women's 50+ Complete Multivitamin       PARoxetine 40 mg tablet  Commonly known as: Paxil      Take 1 tablet (40 mg) by mouth once daily.       simvastatin 20 mg tablet  Commonly known as: Zocor      Take 1 tablet (20 mg) by mouth once daily.              STOP taking these medications      buPROPion  mg 24 hr tablet  Commonly known as: Wellbutrin XL                 Test Results Pending At Discharge  Pending Labs       No current pending labs.            Hospital Course    The patient was admitted to regular medical floor for evaluation and treatment of side effects of Wellbutrin.  The patient had been prescribed Wellbutrin for anxiety and depression 2 weeks prior to admission.  Doses which is increased 1 week prior to admission.  She developed neurological signs and symptoms including akathisia anxiety ataxia hyperactive hyperkinetic muscle activity and dyskinesias.  She was unable to ambulate.  The return was discontinued and all symptoms gradually improved and are resolving on discharge.  She is still unable to ambulate without assistance.  Neurology was consulted and MRI of lumbar spine was ordered with moderate degenerative disc disease and possible L3-L4 radiculopathy but patient has no signs or symptoms of lower extremity radiculopathy  including no back pain leg pain or lower extremity numbness or weakness.  This is most likely an incidental finding.  Physical therapy and Occupational Therapy consulted and is recommend the patient be discharged to skilled nursing facility for short-term rehabilitation.  She will be discharged to skilled nursing facility when insurance precertification is completed.    Pertinent Physical Exam At Time of Discharge  Physical Exam  Constitutional:       Appearance: Normal appearance.   HENT:      Head: Normocephalic and atraumatic.      Nose: Nose normal.      Mouth/Throat:      Mouth: Mucous membranes are moist.      Pharynx: Oropharynx is clear.   Eyes:      Extraocular Movements: Extraocular movements intact.      Conjunctiva/sclera: Conjunctivae normal.      Pupils: Pupils are equal, round, and reactive to light.   Cardiovascular:      Rate and Rhythm: Normal rate and regular rhythm.      Pulses: Normal pulses.      Heart sounds: Normal heart sounds.   Pulmonary:      Effort: Pulmonary effort is normal.      Breath sounds: Normal breath sounds.   Abdominal:      General: Abdomen is flat. Bowel sounds are normal.      Palpations: Abdomen is soft.      Tenderness: There is no abdominal tenderness.   Musculoskeletal:         General: Normal range of motion.      Cervical back: Normal range of motion and neck supple.   Skin:     General: Skin is warm and dry.   Neurological:      General: No focal deficit present.      Mental Status: She is alert and oriented to person, place, and time.   Psychiatric:         Mood and Affect: Mood normal.         Behavior: Behavior normal.         Thought Content: Thought content normal.         Outpatient Follow-Up  Future Appointments   Date Time Provider Department Center   4/18/2024  4:30 PM Michael William MD [id number]     Discharge time is 35 minutes    José Sage MD

## 2024-04-13 PROCEDURE — 2500000004 HC RX 250 GENERAL PHARMACY W/ HCPCS (ALT 636 FOR OP/ED): Performed by: INTERNAL MEDICINE

## 2024-04-13 PROCEDURE — 1100000001 HC PRIVATE ROOM DAILY

## 2024-04-13 PROCEDURE — 2500000002 HC RX 250 W HCPCS SELF ADMINISTERED DRUGS (ALT 637 FOR MEDICARE OP, ALT 636 FOR OP/ED): Performed by: INTERNAL MEDICINE

## 2024-04-13 PROCEDURE — 2500000002 HC RX 250 W HCPCS SELF ADMINISTERED DRUGS (ALT 637 FOR MEDICARE OP, ALT 636 FOR OP/ED): Mod: MUE | Performed by: INTERNAL MEDICINE

## 2024-04-13 PROCEDURE — 2500000001 HC RX 250 WO HCPCS SELF ADMINISTERED DRUGS (ALT 637 FOR MEDICARE OP): Performed by: INTERNAL MEDICINE

## 2024-04-13 RX ADMIN — ACETAMINOPHEN 650 MG: 160 SOLUTION ORAL at 15:42

## 2024-04-13 RX ADMIN — LEVOTHYROXINE SODIUM 50 MCG: 0.05 TABLET ORAL at 06:17

## 2024-04-13 RX ADMIN — ACETAMINOPHEN 650 MG: 325 TABLET ORAL at 03:11

## 2024-04-13 RX ADMIN — PAROXETINE HYDROCHLORIDE 40 MG: 20 TABLET, FILM COATED ORAL at 09:22

## 2024-04-13 RX ADMIN — NYSTATIN 1 APPLICATION: 100000 POWDER TOPICAL at 09:22

## 2024-04-13 RX ADMIN — ENOXAPARIN SODIUM 40 MG: 40 INJECTION SUBCUTANEOUS at 20:51

## 2024-04-13 RX ADMIN — NYSTATIN 1 APPLICATION: 100000 POWDER TOPICAL at 20:51

## 2024-04-13 RX ADMIN — SIMVASTATIN 20 MG: 20 TABLET, FILM COATED ORAL at 09:22

## 2024-04-13 RX ADMIN — FUROSEMIDE 40 MG: 40 TABLET ORAL at 09:22

## 2024-04-13 RX ADMIN — MULTIVITAMIN TABLET 1 TABLET: TABLET at 09:22

## 2024-04-13 RX ADMIN — PANTOPRAZOLE SODIUM 40 MG: 40 TABLET, DELAYED RELEASE ORAL at 06:17

## 2024-04-13 ASSESSMENT — COGNITIVE AND FUNCTIONAL STATUS - GENERAL
DRESSING REGULAR UPPER BODY CLOTHING: A LITTLE
MOVING FROM LYING ON BACK TO SITTING ON SIDE OF FLAT BED WITH BEDRAILS: A LITTLE
TURNING FROM BACK TO SIDE WHILE IN FLAT BAD: A LOT
TOILETING: A LITTLE
MOVING TO AND FROM BED TO CHAIR: A LOT
DRESSING REGULAR UPPER BODY CLOTHING: A LITTLE
PERSONAL GROOMING: A LITTLE
STANDING UP FROM CHAIR USING ARMS: A LOT
PERSONAL GROOMING: A LITTLE
DRESSING REGULAR LOWER BODY CLOTHING: A LITTLE
CLIMB 3 TO 5 STEPS WITH RAILING: TOTAL
MOVING TO AND FROM BED TO CHAIR: A LOT
DAILY ACTIVITIY SCORE: 19
CLIMB 3 TO 5 STEPS WITH RAILING: A LOT
DAILY ACTIVITIY SCORE: 19
TURNING FROM BACK TO SIDE WHILE IN FLAT BAD: A LOT
HELP NEEDED FOR BATHING: A LITTLE
MOBILITY SCORE: 12
TOILETING: A LITTLE
WALKING IN HOSPITAL ROOM: A LOT
WALKING IN HOSPITAL ROOM: A LOT
STANDING UP FROM CHAIR USING ARMS: A LOT
DRESSING REGULAR LOWER BODY CLOTHING: A LITTLE
MOBILITY SCORE: 13
MOVING FROM LYING ON BACK TO SITTING ON SIDE OF FLAT BED WITH BEDRAILS: A LITTLE
HELP NEEDED FOR BATHING: A LITTLE

## 2024-04-13 ASSESSMENT — PAIN DESCRIPTION - LOCATION
LOCATION: BACK
LOCATION: LEG

## 2024-04-13 ASSESSMENT — PAIN SCALES - GENERAL
PAINLEVEL_OUTOF10: 0 - NO PAIN
PAINLEVEL_OUTOF10: 2
PAINLEVEL_OUTOF10: 0 - NO PAIN
PAINLEVEL_OUTOF10: 2
PAINLEVEL_OUTOF10: 3
PAINLEVEL_OUTOF10: 0 - NO PAIN
PAINLEVEL_OUTOF10: 0 - NO PAIN

## 2024-04-13 ASSESSMENT — PAIN DESCRIPTION - ORIENTATION
ORIENTATION: MID
ORIENTATION: LEFT

## 2024-04-13 ASSESSMENT — PAIN - FUNCTIONAL ASSESSMENT
PAIN_FUNCTIONAL_ASSESSMENT: 0-10

## 2024-04-13 NOTE — CARE PLAN
The patient's goals for the shift include  safety, skin care    The clinical goals for the shift include safety  Problem: Skin  Goal: Decreased wound size/increased tissue granulation at next dressing change  Outcome: Progressing  Goal: Participates in plan/prevention/treatment measures  Outcome: Progressing  Flowsheets (Taken 4/13/2024 0146)  Participates in plan/prevention/treatment measures: Discuss with provider PT/OT consult  Goal: Prevent/manage excess moisture  Outcome: Progressing  Goal: Prevent/minimize sheer/friction injuries  Outcome: Progressing  Goal: Promote/optimize nutrition  Outcome: Progressing  Goal: Promote skin healing  Outcome: Progressing     Problem: Pain - Adult  Goal: Verbalizes/displays adequate comfort level or baseline comfort level  Outcome: Progressing     Problem: Safety - Adult  Goal: Free from fall injury  Outcome: Progressing     Problem: Discharge Planning  Goal: Discharge to home or other facility with appropriate resources  Outcome: Progressing     Problem: Chronic Conditions and Co-morbidities  Goal: Patient's chronic conditions and co-morbidity symptoms are monitored and maintained or improved  Outcome: Progressing     Problem: Fall/Injury  Goal: Not fall by end of shift  Outcome: Progressing  Goal: Be free from injury by end of the shift  Outcome: Progressing  Goal: Verbalize understanding of personal risk factors for fall in the hospital  Outcome: Progressing  Goal: Verbalize understanding of risk factor reduction measures to prevent injury from fall in the home  Outcome: Progressing  Goal: Use assistive devices by end of the shift  Outcome: Progressing  Goal: Pace activities to prevent fatigue by end of the shift  Outcome: Progressing

## 2024-04-13 NOTE — CARE PLAN
The patient's goals for the shift include rest     The clinical goals for the shift include safety    Problem: Skin  Goal: Decreased wound size/increased tissue granulation at next dressing change  Flowsheets (Taken 4/13/2024 0725)  Decreased wound size/increased tissue granulation at next dressing change: Promote sleep for wound healing  Goal: Participates in plan/prevention/treatment measures  Flowsheets (Taken 4/13/2024 0725)  Participates in plan/prevention/treatment measures: Elevate heels  Goal: Prevent/manage excess moisture  Flowsheets (Taken 4/13/2024 0725)  Prevent/manage excess moisture: Cleanse incontinence/protect with barrier cream  Goal: Prevent/minimize sheer/friction injuries  Flowsheets (Taken 4/13/2024 0725)  Prevent/minimize sheer/friction injuries: Use pull sheet  Goal: Promote/optimize nutrition  Flowsheets (Taken 4/13/2024 0725)  Promote/optimize nutrition: Monitor/record intake including meals  Goal: Promote skin healing  Flowsheets (Taken 4/13/2024 0725)  Promote skin healing: Protective dressings over bony prominences     Problem: Skin  Goal: Decreased wound size/increased tissue granulation at next dressing change  4/13/2024 0726 by Reynold Solitario RN  Outcome: Progressing  4/13/2024 0725 by Reynold Solitario RN  Flowsheets (Taken 4/13/2024 0725)  Decreased wound size/increased tissue granulation at next dressing change: Promote sleep for wound healing  Goal: Participates in plan/prevention/treatment measures  4/13/2024 0726 by Reynold Solitario RN  Outcome: Progressing  4/13/2024 0725 by Reynold Solitario RN  Flowsheets (Taken 4/13/2024 0725)  Participates in plan/prevention/treatment measures: Elevate heels  Goal: Prevent/manage excess moisture  4/13/2024 0726 by Reynold Solitario RN  Outcome: Progressing  4/13/2024 0725 by Reynold Solitario RN  Flowsheets (Taken 4/13/2024 0725)  Prevent/manage excess moisture: Cleanse incontinence/protect with barrier cream  Goal: Prevent/minimize sheer/friction injuries  4/13/2024 0726  by Reynold [place], RN  Outcome: Progressing  4/13/2024 0725 by Reynold Solitario RN  Flowsheets (Taken 4/13/2024 0725)  Prevent/minimize sheer/friction injuries: Use pull sheet  Goal: Promote/optimize nutrition  4/13/2024 0726 by Reynold Solitario RN  Outcome: Progressing  4/13/2024 0725 by Reynold Solitario RN  Flowsheets (Taken 4/13/2024 0725)  Promote/optimize nutrition: Monitor/record intake including meals  Goal: Promote skin healing  4/13/2024 0726 by Reynold Solitario RN  Outcome: Progressing  4/13/2024 0725 by Reynold Solitario RN  Flowsheets (Taken 4/13/2024 0725)  Promote skin healing: Protective dressings over bony prominences     Problem: Pain - Adult  Goal: Verbalizes/displays adequate comfort level or baseline comfort level  Outcome: Progressing     Problem: Safety - Adult  Goal: Free from fall injury  Outcome: Progressing     Problem: Discharge Planning  Goal: Discharge to home or other facility with appropriate resources  Outcome: Progressing     Problem: Chronic Conditions and Co-morbidities  Goal: Patient's chronic conditions and co-morbidity symptoms are monitored and maintained or improved  Outcome: Progressing     Problem: Fall/Injury  Goal: Not fall by end of shift  Outcome: Progressing  Goal: Be free from injury by end of the shift  Outcome: Progressing  Goal: Verbalize understanding of personal risk factors for fall in the hospital  Outcome: Progressing  Goal: Verbalize understanding of risk factor reduction measures to prevent injury from fall in the home  Outcome: Progressing  Goal: Use assistive devices by end of the shift  Outcome: Progressing  Goal: Pace activities to prevent fatigue by end of the shift  Outcome: Progressing

## 2024-04-13 NOTE — PROGRESS NOTES
Carmen Cao is a 78 y.o. female on day 1 of admission presenting with General weakness.      Subjective   The patient reports she has not tried to ambulate since admission.  She reports she has been having some problem with her balance which is affected by her severe left knee arthritis pain.  She denies lower extremity numbness or weakness.  She reports she has intermittent involuntary movements of legs when laying in bed.  She denies back pain.       Objective     Last Recorded Vitals  /65 (BP Location: Right arm, Patient Position: Lying)   Pulse 62   Temp 36.3 °C (97.3 °F) (Temporal)   Resp 16   Wt 89.9 kg (198 lb 3.1 oz)   SpO2 93%   Intake/Output last 3 Shifts:    Intake/Output Summary (Last 24 hours) at 4/13/2024 0934  Last data filed at 4/13/2024 0906  Gross per 24 hour   Intake 390 ml   Output 400 ml   Net -10 ml       Admission Weight  Weight: 89.9 kg (198 lb 3.1 oz) (04/10/24 2120)    Daily Weight  04/10/24 : 89.9 kg (198 lb 3.1 oz)    Image Results  MR lumbar spine wo IV contrast  Narrative: Interpreted By:  Lyudmila Thompson,   STUDY:  MRI of the lumbar spine without IV contrast;  4/11/2024 5:39 pm      INDICATION:  Signs/Symptoms:Back pain.      COMPARISON:  None.      ACCESSION NUMBER(S):  [id number]      ORDERING CLINICIAN:  JUAN R ORTIZ      TECHNIQUE:  Sagittal and axial STIR and T1-weighted MRI images of the lumbar  spine were acquired using a spondylolysis protocol.  No contrast was  administered.      FINDINGS:  For counting purposes the last lumbarized vertebral body is labeled  L5.      Alignment and vertebral body heights are maintained. Mild edema  within the endplates at T12-L1 is likely degenerative.      Heterogeneous bone marrow signal without a focal lesion on the STIR  sequence is nonspecific. T1 and T2 hyperintense lesion within the L1  vertebral body is compatible with a hemangioma.      There is desiccated disc signal throughout the lumbar spine. There  are mild  degenerative endplate changes throughout the lumbar spine.  Mild-to-moderate disc height loss at T12-L1 L2-L3 and L5-S1.      The conus terminates at L1 and is unremarkable. Prevertebral soft  tissues are not thickened. There is mild fatty atrophy of the  paraspinal muscles.      Evaluation by level:      T12-L1: Central disc protrusion and facet arthrosis. Mild spinal  canal stenosis. No neural foraminal stenosis.      T12-L1: There is a disc extrusion which extends along the superior L1  vertebral body. Bilateral facet arthrosis. No spinal canal stenosis.  Mild neural foraminal stenosis.      L1-L2: Disc bulge with a superimposed central disc extrusion which  extends along the posterior L2 vertebral body. Mild spinal canal  stenosis, narrowing of the subarticular recess and mild bilateral  neural foraminal stenosis.      L2-L3: Disc bulge and facet arthrosis. No spinal canal stenosis, mild  narrowing of the subarticular recess and mild bilateral neural  foraminal stenosis.      L3-L4: Disc bulge and facet arthrosis. No spinal canal stenosis. Mild  bilateral neural foraminal stenosis.      L4-L5: Disc bulge with a superimposed central disc protrusion and  bilateral facet arthrosis. No spinal canal stenosis, mild narrowing  of the subarticular recess, mild left and no right neural foraminal  stenosis. There is abutment of the exiting left L4 nerve root. Please  correlate clinically for symptoms of left L4 radiculopathy.      L5-S1: Disc bulge and facet arthrosis. No spinal canal stenosis. Mild  bilateral neural foraminal stenosis.      Impression: Multilevel degenerative disc disease and facet arthrosis with varying  degrees of mild spinal canal stenosis as detailed above.  Mild-to-moderate neural foraminal narrowing as detailed above. There  may be abutment of the exiting left L4 nerve root. Please correlate  clinically for symptoms of left L4 radiculopathy.      I personally reviewed the images/study and I agree  with the findings  as stated. This study was interpreted at [facility], [place], [place].      MACRO:  None      Signed by: Lyudmila Thompson 4/11/2024 5:47 PM  Dictation workstation:   [id number]  MR brain wo IV contrast  Narrative: Interpreted By:  Lyudmila Thompson,   STUDY:  MR BRAIN WO IV CONTRAST;  4/11/2024 5:24 pm      INDICATION:  Signs/Symptoms:Rule out severe.      COMPARISON:  None.      ACCESSION NUMBER(S):  [id number]      ORDERING CLINICIAN:  JUAN R ORTIZ      TECHNIQUE:  Axial T2, FLAIR, DWI, gradient echo T2 and sagittal and coronal T1  weighted images of brain were acquired.      FINDINGS:  CSF Spaces: The ventricles, sulci and basal cisterns are prominent  compatible with age related involutional changes and moderate volume  loss. There is no extra-axial fluid collection.      Parenchyma: There is no diffusion restriction abnormality to suggest  acute infarct.  Mild degree of nonspecific subcortical and  periventricular T2 and FLAIR hyperintense signal compatible with  microangiopathy. Hyperintense signal within the basal ganglia and  thalami may represent microangiopathy and or nonacute lacunar  infarcts. There is no mass effect or midline shift. Cerebellar  tonsils are above the foramen magnum. Pituitary and sella are not  enlarged. No abnormal susceptibility artifact.      Paranasal Sinuses and Mastoids: Visualized paranasal sinuses and  mastoid air cells are predominantly clear. Major intracranial flow  voids at the skull base are unremarkable.      Impression: No evidence of acute infarct, intracranial mass effect or midline  shift.      Moderate volume loss. Mild nonspecific white matter signal compatible  with microangiopathy.      MACRO:  None      Signed by: Lyudmila Thompson 4/11/2024 5:28 PM  Dictation workstation:   [id number]      Physical Exam  Constitutional:       Appearance: Normal appearance.   HENT:      Head: Normocephalic and atraumatic.      Nose:  Nose normal.      Mouth/Throat:      Mouth: Mucous membranes are moist.      Pharynx: Oropharynx is clear.   Eyes:      Extraocular Movements: Extraocular movements intact.      Conjunctiva/sclera: Conjunctivae normal.      Pupils: Pupils are equal, round, and reactive to light.   Cardiovascular:      Rate and Rhythm: Normal rate and regular rhythm.      Pulses: Normal pulses.      Heart sounds: Normal heart sounds.   Pulmonary:      Effort: Pulmonary effort is normal.      Breath sounds: Normal breath sounds.   Abdominal:      General: Abdomen is flat. Bowel sounds are normal.      Palpations: Abdomen is soft.      Tenderness: There is no abdominal tenderness.   Musculoskeletal:         General: Normal range of motion.      Cervical back: Normal range of motion and neck supple.   Skin:     General: Skin is warm and dry.   Neurological:      General: No focal deficit present.      Mental Status: She is alert and oriented to person, place, and time.   Psychiatric:         Mood and Affect: Mood normal.         Behavior: Behavior normal.         Thought Content: Thought content normal.         Relevant Results               Assessment/Plan      Generalized weakness  Limb tremor    The patient was admitted to regular medical floor for evaluation and treatment of side effects of Wellbutrin.  The patient had been prescribed Wellbutrin for anxiety and depression 2 weeks prior to admission.  Doses which is increased 1 week prior to admission.  She developed neurological signs and symptoms including akathisia anxiety ataxia hyperactive hyperkinetic muscle activity and dyskinesias.  She was unable to ambulate.  The return was discontinued and all symptoms gradually improved and are resolving on discharge.  She is still unable to ambulate without assistance.  Neurology was consulted and MRI of lumbar spine was ordered with moderate degenerative disc disease and possible L3-L4 radiculopathy but patient has no signs or symptoms  of lower extremity radiculopathy including no back pain leg pain or lower extremity numbness or weakness.  This is most likely an incidental finding.  Physical therapy and Occupational Therapy consulted and is recommend the patient be discharged to skilled nursing facility for short-term rehabilitation.  She will be discharged to skilled nursing facility when insurance precertification is completed.            Osmany Sheriff MD

## 2024-04-13 NOTE — CARE PLAN
The patient's goals for the shift include  rest    The clinical goals for the shift include rest, and safety      04/13/24 at 7:35 PM - Jessica Carvalho RN

## 2024-04-14 PROCEDURE — 97116 GAIT TRAINING THERAPY: CPT | Mod: GP

## 2024-04-14 PROCEDURE — 2500000004 HC RX 250 GENERAL PHARMACY W/ HCPCS (ALT 636 FOR OP/ED): Performed by: INTERNAL MEDICINE

## 2024-04-14 PROCEDURE — 2500000002 HC RX 250 W HCPCS SELF ADMINISTERED DRUGS (ALT 637 FOR MEDICARE OP, ALT 636 FOR OP/ED): Performed by: INTERNAL MEDICINE

## 2024-04-14 PROCEDURE — 97110 THERAPEUTIC EXERCISES: CPT | Mod: GP

## 2024-04-14 PROCEDURE — 2500000001 HC RX 250 WO HCPCS SELF ADMINISTERED DRUGS (ALT 637 FOR MEDICARE OP): Performed by: INTERNAL MEDICINE

## 2024-04-14 PROCEDURE — 2500000002 HC RX 250 W HCPCS SELF ADMINISTERED DRUGS (ALT 637 FOR MEDICARE OP, ALT 636 FOR OP/ED): Mod: MUE | Performed by: INTERNAL MEDICINE

## 2024-04-14 PROCEDURE — 1100000001 HC PRIVATE ROOM DAILY

## 2024-04-14 RX ADMIN — NYSTATIN 1 APPLICATION: 100000 POWDER TOPICAL at 09:06

## 2024-04-14 RX ADMIN — LEVOTHYROXINE SODIUM 50 MCG: 0.05 TABLET ORAL at 06:35

## 2024-04-14 RX ADMIN — ENOXAPARIN SODIUM 40 MG: 40 INJECTION SUBCUTANEOUS at 20:16

## 2024-04-14 RX ADMIN — FUROSEMIDE 40 MG: 40 TABLET ORAL at 09:07

## 2024-04-14 RX ADMIN — PAROXETINE HYDROCHLORIDE 40 MG: 20 TABLET, FILM COATED ORAL at 09:07

## 2024-04-14 RX ADMIN — PANTOPRAZOLE SODIUM 40 MG: 40 TABLET, DELAYED RELEASE ORAL at 06:35

## 2024-04-14 RX ADMIN — SIMVASTATIN 20 MG: 20 TABLET, FILM COATED ORAL at 09:07

## 2024-04-14 RX ADMIN — MULTIVITAMIN TABLET 1 TABLET: TABLET at 09:07

## 2024-04-14 RX ADMIN — NYSTATIN 1 APPLICATION: 100000 POWDER TOPICAL at 20:16

## 2024-04-14 ASSESSMENT — COGNITIVE AND FUNCTIONAL STATUS - GENERAL
TOILETING: A LITTLE
MOVING TO AND FROM BED TO CHAIR: A LOT
MOBILITY SCORE: 14
TURNING FROM BACK TO SIDE WHILE IN FLAT BAD: A LOT
CLIMB 3 TO 5 STEPS WITH RAILING: TOTAL
MOVING FROM LYING ON BACK TO SITTING ON SIDE OF FLAT BED WITH BEDRAILS: A LITTLE
STANDING UP FROM CHAIR USING ARMS: A LOT
HELP NEEDED FOR BATHING: A LITTLE
DAILY ACTIVITIY SCORE: 18
MOBILITY SCORE: 13
STANDING UP FROM CHAIR USING ARMS: A LOT
DAILY ACTIVITIY SCORE: 19
MOVING TO AND FROM BED TO CHAIR: A LOT
CLIMB 3 TO 5 STEPS WITH RAILING: A LOT
PERSONAL GROOMING: A LITTLE
DRESSING REGULAR UPPER BODY CLOTHING: A LITTLE
PERSONAL GROOMING: A LITTLE
MOVING TO AND FROM BED TO CHAIR: A LOT
DRESSING REGULAR LOWER BODY CLOTHING: A LITTLE
HELP NEEDED FOR BATHING: A LITTLE
DRESSING REGULAR UPPER BODY CLOTHING: A LITTLE
EATING MEALS: A LITTLE
STANDING UP FROM CHAIR USING ARMS: A LOT
TOILETING: A LITTLE
MOBILITY SCORE: 12
WALKING IN HOSPITAL ROOM: A LOT
MOVING FROM LYING ON BACK TO SITTING ON SIDE OF FLAT BED WITH BEDRAILS: A LITTLE
WALKING IN HOSPITAL ROOM: A LOT
MOVING FROM LYING ON BACK TO SITTING ON SIDE OF FLAT BED WITH BEDRAILS: A LITTLE
WALKING IN HOSPITAL ROOM: A LITTLE
TURNING FROM BACK TO SIDE WHILE IN FLAT BAD: A LOT
DRESSING REGULAR LOWER BODY CLOTHING: A LITTLE
CLIMB 3 TO 5 STEPS WITH RAILING: A LOT
TURNING FROM BACK TO SIDE WHILE IN FLAT BAD: A LOT

## 2024-04-14 ASSESSMENT — PAIN SCALES - GENERAL
PAINLEVEL_OUTOF10: 0 - NO PAIN
PAINLEVEL_OUTOF10: 0 - NO PAIN
PAINLEVEL_OUTOF10: 4

## 2024-04-14 ASSESSMENT — PAIN - FUNCTIONAL ASSESSMENT: PAIN_FUNCTIONAL_ASSESSMENT: 0-10

## 2024-04-14 NOTE — CARE PLAN
The patient's goals for the shift include  safety, and rest    The clinical goals for the shift include safety, and promote rest      04/14/24 at 7:52 PM - Jessica Carvalho RN

## 2024-04-14 NOTE — CARE PLAN
Problem: Skin  Goal: Decreased wound size/increased tissue granulation at next dressing change  4/14/2024 0709 by Reynold Solitario RN  Outcome: Progressing  Flowsheets (Taken 4/14/2024 0709)  Decreased wound size/increased tissue granulation at next dressing change: Promote sleep for wound healing  4/14/2024 0708 by Reynold Solitario RN  Outcome: Progressing  Goal: Participates in plan/prevention/treatment measures  4/14/2024 0709 by Reynold Solitario RN  Flowsheets (Taken 4/13/2024 1934 by Jessica Carvalho RN)  Participates in plan/prevention/treatment measures:   Elevate heels   Increase activity/out of bed for meals  4/14/2024 0708 by Reynold Solitario RN  Outcome: Progressing  Goal: Prevent/manage excess moisture  4/14/2024 0709 by Reynold Solitario RN  Flowsheets (Taken 4/14/2024 0709)  Prevent/manage excess moisture: Cleanse incontinence/protect with barrier cream  4/14/2024 0708 by Reynold Solitario RN  Outcome: Progressing  Goal: Prevent/minimize sheer/friction injuries  4/14/2024 0709 by Reynold Solitario RN  Flowsheets (Taken 4/14/2024 0709)  Prevent/minimize sheer/friction injuries: Increase activity/out of bed for meals  4/14/2024 0708 by Reynold Solitario RN  Outcome: Progressing  Goal: Promote/optimize nutrition  4/14/2024 0709 by Reynold Solitario RN  Flowsheets (Taken 4/14/2024 0709)  Promote/optimize nutrition: Assist with feeding  4/14/2024 0708 by Reynold Solitario RN  Outcome: Progressing  Goal: Promote skin healing  4/14/2024 0709 by Reynold Solitario RN  Flowsheets (Taken 4/14/2024 0709)  Promote skin healing: Rotate device position/do not position patient on device  4/14/2024 0708 by Reynold Solitario RN  Outcome: Progressing     Problem: Pain - Adult  Goal: Verbalizes/displays adequate comfort level or baseline comfort level  Outcome: Progressing     Problem: Safety - Adult  Goal: Free from fall injury  Outcome: Progressing     Problem: Discharge Planning  Goal: Discharge to home or other facility with appropriate resources  Outcome: Progressing     Problem:  Chronic Conditions and Co-morbidities  Goal: Patient's chronic conditions and co-morbidity symptoms are monitored and maintained or improved  Outcome: Progressing     Problem: Fall/Injury  Goal: Not fall by end of shift  Outcome: Progressing  Goal: Be free from injury by end of the shift  Outcome: Progressing  Goal: Verbalize understanding of personal risk factors for fall in the hospital  Outcome: Progressing  Goal: Verbalize understanding of risk factor reduction measures to prevent injury from fall in the home  Outcome: Progressing  Goal: Use assistive devices by end of the shift  Outcome: Progressing  Goal: Pace activities to prevent fatigue by end of the shift  Outcome: Progressing

## 2024-04-14 NOTE — PROGRESS NOTES
Physical Therapy    Physical Therapy Treatment    Patient Name: Carmen Cao  MRN: [id number]  Today's Date: 4/14/2024  Time Calculation  Start Time: 1440  Stop Time: 1504  Time Calculation (min): 24 min       Assessment/Plan   PT Assessment  PT Assessment Results: Decreased strength, Decreased range of motion, Decreased endurance, Impaired balance, Decreased mobility, Decreased coordination, Decreased cognition, Pain, Obesity  Rehab Prognosis: Fair  Evaluation/Treatment Tolerance: Patient limited by pain  Medical Staff Made Aware: Yes  Strengths: Attitude of self  Barriers to Participation: Comorbidities  End of Session Communication: Bedside nurse  Assessment Comment: Pt ambulated with better overall gait pattern with walker height lowered and adjusted for pt's height. Pt c/o L knee pain during ambulation but better with step to pattern.  End of Session Patient Position: Up in chair (dtr present)     PT Plan  Treatment/Interventions: Bed mobility, Gait training, Transfer training, Balance training, Neuromuscular re-education, Strengthening, Endurance training, Range of motion, Therapeutic exercise, Therapeutic activity, Home exercise program  PT Plan: Skilled PT  PT Frequency: 5 times per week  PT Discharge Recommendations: Moderate intensity level of continued care  Equipment Recommended upon Discharge: Wheeled walker  PT Recommended Transfer Status: Assist x2, Assistive device  PT - OK to Discharge: Yes      General Visit Information:   PT  Visit  PT Received On: 04/14/24  Response to Previous Treatment: Patient with no complaints from previous session.  General  Reason for Referral: Impaired mobilty  Referred By: Dr Washington  Past Medical History Relevant to Rehab: HTN, obesity, OA, anxiety, depression, prediabetes  Family/Caregiver Present: Yes  Prior to Session Communication: PCT/NA/CTA  Patient Position Received: Up in bathroom  Preferred Learning Style: verbal, visual  General Comment: Agreeable to PT  treatment    Subjective   Precautions:     Vital Signs:       Objective   Pain:  Pain Assessment  Pain Assessment: 0-10  Pain Score: 4  Pain Type: Chronic pain  Pain Location: Knee  Pain Orientation: Left  Pain Interventions:  (exercise)  Response to Interventions: better  Cognition:  Cognition  Overall Cognitive Status: Impaired  Orientation Level: Oriented X4  Insight: Moderate  Impulsive: Mildly  Processing Speed: Delayed  Postural Control:     Extremity/Trunk Assessments:  RUE   RUE : Within Functional Limits  LUE   LUE: Within Functional Limits  RLE   RLE : Exceptions to WFL  Strength RLE  RLE Overall Strength: Greater than or equal to 3/5 as evidenced by functional mobility  LLE   LLE : Exceptions to WFL  Strength LLE  LLE Overall Strength: Greater than or equal to 3/5 as evidenced by functional mobility  Activity Tolerance:  Activity Tolerance  Endurance: Decreased tolerance for upright activites  Treatments:  Therapeutic Exercise  Therapeutic Exercise Performed: Yes  Therapeutic Exercise Activity 1: sit to stand 3x5, heel slides L x 15, standing march x 10, standing heel raise x 10    Therapeutic Activity  Therapeutic Activity Performed: No    Balance/Neuromuscular Re-Education  Balance/Neuromuscular Re-Education Activity Performed: No    Bed Mobility  Bed Mobility: No    Ambulation/Gait Training  Ambulation/Gait Training Performed: Yes  Ambulation/Gait Training 1  Surface 1: Level tile  Device 1: Rolling walker  Assistance 1: Contact guard, Moderate verbal cues  Quality of Gait 1: Narrow base of support, Forward flexed posture, Diminished heel strike, Decreased step length, Shuffling gait  Comments/Distance (ft) 1: x15 feet, x 5 feet fwd/back, walk height lowered for better walking posture, pt forward flexed with shuffilng gait, pt cued for hip extension for upright posture and to increase step length, also cued for step to sequencing with walker to reduce knee pain while ambulating. Pt's dtr  present.  Transfers  Transfer: Yes  Transfer 1  Transfer From 1: Commode-standard to  Transfer to 1: Stand  Technique 1: Sit to stand  Transfer Device 1: Walker  Transfer Level of Assistance 1: Moderate assistance  Trials/Comments 1: x1  Transfers 2  Transfer From 2: Stand to  Transfer to 2: Sit  Technique 2: Stand to sit  Transfer Device 2: Walker  Transfer Level of Assistance 2: Minimum assistance, Moderate verbal cues    Outcome Measures:  [facility] Basic Mobility  Turning from your back to your side while in a flat bed without using bedrails: A little  Moving from lying on your back to sitting on the side of a flat bed without using bedrails: A lot  Moving to and from bed to chair (including a wheelchair): A lot  Standing up from a chair using your arms (e.g. wheelchair or bedside chair): A lot  To walk in hospital room: A little  Climbing 3-5 steps with railing: A lot  Basic Mobility - Total Score: 14    Education Documentation  Mobility Training, taught by Reese Drake, PT at 4/14/2024  3:14 PM.  Learner: Patient  Readiness: Eager  Method: Explanation  Response: Verbalizes Understanding  Comment: educated on benefits of proper walker height for ambulation    Education Comments  No comments found.        OP EDUCATION:       Encounter Problems       Encounter Problems (Active)       Balance       Sitting Balance (Progressing)       Start:  04/11/24    Expected End:  04/25/24       Pt will demonstrate good sitting balance to promote safe engagement with out of bed activities           Standing Balance (Progressing)       Start:  04/11/24    Expected End:  04/25/24       Pt will demonstrate good static standing balance to promote safe participation with out of bed activity, transfers, and mobility              Mobility       Ambulation (Progressing)       Start:  04/11/24    Expected End:  04/25/24       Pt will ambulate 50' modified independent assist with walker to promote safe home mobility              PT  Transfers       Supine to sit (Progressing)       Start:  04/11/24    Expected End:  04/25/24       Pt will transfer supine to sitting at edge of bed with modified independent assist to promote acute care out of bed activity           Sit to stand (Progressing)       Start:  04/11/24    Expected End:  04/25/24       Pt will transfer sit to standing position with modified independent assist and walker to promote safe out of bed activity           Bed to chair (Progressing)       Start:  04/11/24    Expected End:  04/25/24       Pt will transfer from sitting edge of bed to the chair with modified independent assist and walker to promote out of bed activity and reduce the risks of prolonged acute care bedrest              Pain - Adult          Safety       Safe Mobility Techniques (Progressing)       Start:  04/11/24    Expected End:  04/25/24       Pt will correctly identify and demonstrate safe mobility techniques to reduce their risks for falls during their acute care stay

## 2024-04-14 NOTE — CARE PLAN
The patient's goals for the shift include out of bed with meals   Problem: Skin  Goal: Decreased wound size/increased tissue granulation at next dressing change  Outcome: Progressing  Goal: Participates in plan/prevention/treatment measures  Outcome: Progressing  Goal: Prevent/manage excess moisture  Outcome: Progressing  Goal: Prevent/minimize sheer/friction injuries  Outcome: Progressing  Goal: Promote/optimize nutrition  Outcome: Progressing  Goal: Promote skin healing  Outcome: Progressing     Problem: Pain - Adult  Goal: Verbalizes/displays adequate comfort level or baseline comfort level  Outcome: Progressing     Problem: Safety - Adult  Goal: Free from fall injury  Outcome: Progressing     Problem: Discharge Planning  Goal: Discharge to home or other facility with appropriate resources  Outcome: Progressing     Problem: Chronic Conditions and Co-morbidities  Goal: Patient's chronic conditions and co-morbidity symptoms are monitored and maintained or improved  Outcome: Progressing     Problem: Fall/Injury  Goal: Not fall by end of shift  Outcome: Progressing  Goal: Be free from injury by end of the shift  Outcome: Progressing  Goal: Verbalize understanding of personal risk factors for fall in the hospital  Outcome: Progressing  Goal: Verbalize understanding of risk factor reduction measures to prevent injury from fall in the home  Outcome: Progressing  Goal: Use assistive devices by end of the shift  Outcome: Progressing  Goal: Pace activities to prevent fatigue by end of the shift  Outcome: Progressing       The clinical goals for the shift include rest, and safety

## 2024-04-14 NOTE — PROGRESS NOTES
Carmen Cao is a 78 y.o. female on day 2 of admission presenting with General weakness.      Subjective   She reports she has been having some problem with her balance which is affected by her severe left knee arthritis pain.  She denies lower extremity numbness or weakness.  She reports she has intermittent involuntary movements of legs when laying in bed.  She denies back pain.       Objective     Last Recorded Vitals  BP 98/76 (BP Location: Right arm, Patient Position: Lying)   Pulse 69   Temp 36.5 °C (97.7 °F) (Temporal)   Resp 18   Wt 89.9 kg (198 lb 3.1 oz)   SpO2 92%   Intake/Output last 3 Shifts:    Intake/Output Summary (Last 24 hours) at 4/14/2024 0826  Last data filed at 4/14/2024 0641  Gross per 24 hour   Intake 700 ml   Output 0 ml   Net 700 ml       Admission Weight  Weight: 89.9 kg (198 lb 3.1 oz) (04/10/24 2120)    Daily Weight  04/10/24 : 89.9 kg (198 lb 3.1 oz)    Image Results  MR lumbar spine wo IV contrast  Narrative: Interpreted By:  Lyudmila Thompson,   STUDY:  MRI of the lumbar spine without IV contrast;  4/11/2024 5:39 pm      INDICATION:  Signs/Symptoms:Back pain.      COMPARISON:  None.      ACCESSION NUMBER(S):  [id number]      ORDERING CLINICIAN:  JUAN R ORTIZ      TECHNIQUE:  Sagittal and axial STIR and T1-weighted MRI images of the lumbar  spine were acquired using a spondylolysis protocol.  No contrast was  administered.      FINDINGS:  For counting purposes the last lumbarized vertebral body is labeled  L5.      Alignment and vertebral body heights are maintained. Mild edema  within the endplates at T12-L1 is likely degenerative.      Heterogeneous bone marrow signal without a focal lesion on the STIR  sequence is nonspecific. T1 and T2 hyperintense lesion within the L1  vertebral body is compatible with a hemangioma.      There is desiccated disc signal throughout the lumbar spine. There  are mild degenerative endplate changes throughout the lumbar  spine.  Mild-to-moderate disc height loss at T12-L1 L2-L3 and L5-S1.      The conus terminates at L1 and is unremarkable. Prevertebral soft  tissues are not thickened. There is mild fatty atrophy of the  paraspinal muscles.      Evaluation by level:      T12-L1: Central disc protrusion and facet arthrosis. Mild spinal  canal stenosis. No neural foraminal stenosis.      T12-L1: There is a disc extrusion which extends along the superior L1  vertebral body. Bilateral facet arthrosis. No spinal canal stenosis.  Mild neural foraminal stenosis.      L1-L2: Disc bulge with a superimposed central disc extrusion which  extends along the posterior L2 vertebral body. Mild spinal canal  stenosis, narrowing of the subarticular recess and mild bilateral  neural foraminal stenosis.      L2-L3: Disc bulge and facet arthrosis. No spinal canal stenosis, mild  narrowing of the subarticular recess and mild bilateral neural  foraminal stenosis.      L3-L4: Disc bulge and facet arthrosis. No spinal canal stenosis. Mild  bilateral neural foraminal stenosis.      L4-L5: Disc bulge with a superimposed central disc protrusion and  bilateral facet arthrosis. No spinal canal stenosis, mild narrowing  of the subarticular recess, mild left and no right neural foraminal  stenosis. There is abutment of the exiting left L4 nerve root. Please  correlate clinically for symptoms of left L4 radiculopathy.      L5-S1: Disc bulge and facet arthrosis. No spinal canal stenosis. Mild  bilateral neural foraminal stenosis.      Impression: Multilevel degenerative disc disease and facet arthrosis with varying  degrees of mild spinal canal stenosis as detailed above.  Mild-to-moderate neural foraminal narrowing as detailed above. There  may be abutment of the exiting left L4 nerve root. Please correlate  clinically for symptoms of left L4 radiculopathy.      I personally reviewed the images/study and I agree with the findings  as stated. This study was  interpreted at [place], [place].      MACRO:  None      Signed by: Lyudmila Thompson 4/11/2024 5:47 PM  Dictation workstation:   [id number]  MR brain wo IV contrast  Narrative: Interpreted By:  Lyudmila Thompson,   STUDY:  MR BRAIN WO IV CONTRAST;  4/11/2024 5:24 pm      INDICATION:  Signs/Symptoms:Rule out severe.      COMPARISON:  None.      ACCESSION NUMBER(S):  [id number]      ORDERING CLINICIAN:  JUAN R ORTIZ      TECHNIQUE:  Axial T2, FLAIR, DWI, gradient echo T2 and sagittal and coronal T1  weighted images of brain were acquired.      FINDINGS:  CSF Spaces: The ventricles, sulci and basal cisterns are prominent  compatible with age related involutional changes and moderate volume  loss. There is no extra-axial fluid collection.      Parenchyma: There is no diffusion restriction abnormality to suggest  acute infarct.  Mild degree of nonspecific subcortical and  periventricular T2 and FLAIR hyperintense signal compatible with  microangiopathy. Hyperintense signal within the basal ganglia and  thalami may represent microangiopathy and or nonacute lacunar  infarcts. There is no mass effect or midline shift. Cerebellar  tonsils are above the foramen magnum. Pituitary and sella are not  enlarged. No abnormal susceptibility artifact.      Paranasal Sinuses and Mastoids: Visualized paranasal sinuses and  mastoid air cells are predominantly clear. Major intracranial flow  voids at the skull base are unremarkable.      Impression: No evidence of acute infarct, intracranial mass effect or midline  shift.      Moderate volume loss. Mild nonspecific white matter signal compatible  with microangiopathy.      MACRO:  None      Signed by: Lyudmila Thompson 4/11/2024 5:28 PM  Dictation workstation:   [id number]      Assessment/Plan      Generalized weakness  Limb tremor    The patient was admitted to regular medical floor for evaluation and treatment of side effects of Wellbutrin.  The  patient had been prescribed Wellbutrin for anxiety and depression 2 weeks prior to admission.  Doses which is increased 1 week prior to admission.  She developed neurological signs and symptoms including akathisia anxiety ataxia hyperactive hyperkinetic muscle activity and dyskinesias.  She was unable to ambulate.  The return was discontinued and all symptoms gradually improved and are resolving on discharge.  She is still unable to ambulate without assistance.  Neurology was consulted and MRI of lumbar spine was ordered with moderate degenerative disc disease and possible L3-L4 radiculopathy but patient has no signs or symptoms of lower extremity radiculopathy including no back pain leg pain or lower extremity numbness or weakness.  This is most likely an incidental finding.  Physical therapy and Occupational Therapy consulted and is recommend the patient be discharged to skilled nursing facility for short-term rehabilitation.  She will be discharged to skilled nursing facility when insurance precertification is completed.            Osmany Sheriff MD

## 2024-04-15 ENCOUNTER — TELEPHONE (OUTPATIENT)
Dept: PRIMARY CARE | Facility: CLINIC | Age: 79
End: 2024-04-15
Payer: COMMERCIAL

## 2024-04-15 VITALS
DIASTOLIC BLOOD PRESSURE: 74 MMHG | SYSTOLIC BLOOD PRESSURE: 151 MMHG | WEIGHT: 198.19 LBS | OXYGEN SATURATION: 97 % | BODY MASS INDEX: 38.91 KG/M2 | TEMPERATURE: 97 F | HEIGHT: 60 IN | HEART RATE: 69 BPM | RESPIRATION RATE: 19 BRPM

## 2024-04-15 PROCEDURE — 2500000001 HC RX 250 WO HCPCS SELF ADMINISTERED DRUGS (ALT 637 FOR MEDICARE OP): Performed by: INTERNAL MEDICINE

## 2024-04-15 PROCEDURE — 97535 SELF CARE MNGMENT TRAINING: CPT | Mod: GO,CO

## 2024-04-15 PROCEDURE — 97116 GAIT TRAINING THERAPY: CPT | Mod: GP,CQ

## 2024-04-15 PROCEDURE — 2500000002 HC RX 250 W HCPCS SELF ADMINISTERED DRUGS (ALT 637 FOR MEDICARE OP, ALT 636 FOR OP/ED): Performed by: INTERNAL MEDICINE

## 2024-04-15 PROCEDURE — 97110 THERAPEUTIC EXERCISES: CPT | Mod: GP,CQ

## 2024-04-15 PROCEDURE — 2500000002 HC RX 250 W HCPCS SELF ADMINISTERED DRUGS (ALT 637 FOR MEDICARE OP, ALT 636 FOR OP/ED): Mod: MUE | Performed by: INTERNAL MEDICINE

## 2024-04-15 RX ADMIN — PANTOPRAZOLE SODIUM 40 MG: 40 TABLET, DELAYED RELEASE ORAL at 05:28

## 2024-04-15 RX ADMIN — PAROXETINE HYDROCHLORIDE 40 MG: 20 TABLET, FILM COATED ORAL at 09:27

## 2024-04-15 RX ADMIN — NYSTATIN 1 APPLICATION: 100000 POWDER TOPICAL at 09:29

## 2024-04-15 RX ADMIN — LEVOTHYROXINE SODIUM 50 MCG: 0.05 TABLET ORAL at 05:28

## 2024-04-15 RX ADMIN — SIMVASTATIN 20 MG: 20 TABLET, FILM COATED ORAL at 09:27

## 2024-04-15 RX ADMIN — FUROSEMIDE 40 MG: 40 TABLET ORAL at 09:27

## 2024-04-15 RX ADMIN — MULTIVITAMIN TABLET 1 TABLET: TABLET at 09:27

## 2024-04-15 ASSESSMENT — COGNITIVE AND FUNCTIONAL STATUS - GENERAL
WALKING IN HOSPITAL ROOM: A LOT
MOVING TO AND FROM BED TO CHAIR: A LOT
DAILY ACTIVITIY SCORE: 17
STANDING UP FROM CHAIR USING ARMS: A LOT
MOVING FROM LYING ON BACK TO SITTING ON SIDE OF FLAT BED WITH BEDRAILS: A LITTLE
MOBILITY SCORE: 13
MOVING TO AND FROM BED TO CHAIR: A LOT
TURNING FROM BACK TO SIDE WHILE IN FLAT BAD: A LOT
MOVING FROM LYING ON BACK TO SITTING ON SIDE OF FLAT BED WITH BEDRAILS: A LITTLE
TOILETING: A LOT
PERSONAL GROOMING: A LITTLE
TURNING FROM BACK TO SIDE WHILE IN FLAT BAD: A LOT
STANDING UP FROM CHAIR USING ARMS: A LOT
DRESSING REGULAR UPPER BODY CLOTHING: A LITTLE
MOBILITY SCORE: 13
DRESSING REGULAR LOWER BODY CLOTHING: A LITTLE
CLIMB 3 TO 5 STEPS WITH RAILING: A LOT
HELP NEEDED FOR BATHING: A LOT
DRESSING REGULAR UPPER BODY CLOTHING: A LITTLE
TOILETING: A LOT
DAILY ACTIVITIY SCORE: 17
EATING MEALS: A LITTLE
CLIMB 3 TO 5 STEPS WITH RAILING: A LOT
HELP NEEDED FOR BATHING: A LITTLE
WALKING IN HOSPITAL ROOM: A LOT
DRESSING REGULAR LOWER BODY CLOTHING: A LOT

## 2024-04-15 ASSESSMENT — PAIN SCALES - GENERAL
PAINLEVEL_OUTOF10: 1
PAINLEVEL_OUTOF10: 0 - NO PAIN
PAINLEVEL_OUTOF10: 0 - NO PAIN

## 2024-04-15 ASSESSMENT — PAIN - FUNCTIONAL ASSESSMENT
PAIN_FUNCTIONAL_ASSESSMENT: 0-10
PAIN_FUNCTIONAL_ASSESSMENT: 0-10

## 2024-04-15 NOTE — PROGRESS NOTES
Carmen Cao is a 78 y.o. female on day 3 of admission presenting with General weakness.      Subjective    She denies back pain.       Objective     Last Recorded Vitals  /74 (BP Location: Left arm, Patient Position: Sitting)   Pulse 69   Temp 36.1 °C (97 °F) (Temporal)   Resp 19   Wt 89.9 kg (198 lb 3.1 oz)   SpO2 97%   Intake/Output last 3 Shifts:    Intake/Output Summary (Last 24 hours) at 4/15/2024 0934  Last data filed at 4/15/2024 0151  Gross per 24 hour   Intake 475 ml   Output 0 ml   Net 475 ml       Admission Weight  Weight: 89.9 kg (198 lb 3.1 oz) (04/10/24 2120)    Daily Weight  04/10/24 : 89.9 kg (198 lb 3.1 oz)    Image Results  MR lumbar spine wo IV contrast  Narrative: Interpreted By:  Lyudmila Thompson,   STUDY:  MRI of the lumbar spine without IV contrast;  4/11/2024 5:39 pm      INDICATION:  Signs/Symptoms:Back pain.      COMPARISON:  None.      ACCESSION NUMBER(S):  [id number]      ORDERING CLINICIAN:  JUAN R ORTIZ      TECHNIQUE:  Sagittal and axial STIR and T1-weighted MRI images of the lumbar  spine were acquired using a spondylolysis protocol.  No contrast was  administered.      FINDINGS:  For counting purposes the last lumbarized vertebral body is labeled  L5.      Alignment and vertebral body heights are maintained. Mild edema  within the endplates at T12-L1 is likely degenerative.      Heterogeneous bone marrow signal without a focal lesion on the STIR  sequence is nonspecific. T1 and T2 hyperintense lesion within the L1  vertebral body is compatible with a hemangioma.      There is desiccated disc signal throughout the lumbar spine. There  are mild degenerative endplate changes throughout the lumbar spine.  Mild-to-moderate disc height loss at T12-L1 L2-L3 and L5-S1.      The conus terminates at L1 and is unremarkable. Prevertebral soft  tissues are not thickened. There is mild fatty atrophy of the  paraspinal muscles.      Evaluation by level:      T12-L1: Central  disc protrusion and facet arthrosis. Mild spinal  canal stenosis. No neural foraminal stenosis.      T12-L1: There is a disc extrusion which extends along the superior L1  vertebral body. Bilateral facet arthrosis. No spinal canal stenosis.  Mild neural foraminal stenosis.      L1-L2: Disc bulge with a superimposed central disc extrusion which  extends along the posterior L2 vertebral body. Mild spinal canal  stenosis, narrowing of the subarticular recess and mild bilateral  neural foraminal stenosis.      L2-L3: Disc bulge and facet arthrosis. No spinal canal stenosis, mild  narrowing of the subarticular recess and mild bilateral neural  foraminal stenosis.      L3-L4: Disc bulge and facet arthrosis. No spinal canal stenosis. Mild  bilateral neural foraminal stenosis.      L4-L5: Disc bulge with a superimposed central disc protrusion and  bilateral facet arthrosis. No spinal canal stenosis, mild narrowing  of the subarticular recess, mild left and no right neural foraminal  stenosis. There is abutment of the exiting left L4 nerve root. Please  correlate clinically for symptoms of left L4 radiculopathy.      L5-S1: Disc bulge and facet arthrosis. No spinal canal stenosis. Mild  bilateral neural foraminal stenosis.      Impression: Multilevel degenerative disc disease and facet arthrosis with varying  degrees of mild spinal canal stenosis as detailed above.  Mild-to-moderate neural foraminal narrowing as detailed above. There  may be abutment of the exiting left L4 nerve root. Please correlate  clinically for symptoms of left L4 radiculopathy.      I personally reviewed the images/study and I agree with the findings  as stated. This study was interpreted at [place], [place].      MACRO:  None      Signed by: Lyudmila Thompson 4/11/2024 5:47 PM  Dictation workstation:   [id number]  MR brain wo IV contrast  Narrative: Interpreted By:  Lyudmila Thompson,   STUDY:  MR BRAIN WO IV  CONTRAST;  4/11/2024 5:24 pm      INDICATION:  Signs/Symptoms:Rule out severe.      COMPARISON:  None.      ACCESSION NUMBER(S):  [id number]      ORDERING CLINICIAN:  JUAN R ORTIZ      TECHNIQUE:  Axial T2, FLAIR, DWI, gradient echo T2 and sagittal and coronal T1  weighted images of brain were acquired.      FINDINGS:  CSF Spaces: The ventricles, sulci and basal cisterns are prominent  compatible with age related involutional changes and moderate volume  loss. There is no extra-axial fluid collection.      Parenchyma: There is no diffusion restriction abnormality to suggest  acute infarct.  Mild degree of nonspecific subcortical and  periventricular T2 and FLAIR hyperintense signal compatible with  microangiopathy. Hyperintense signal within the basal ganglia and  thalami may represent microangiopathy and or nonacute lacunar  infarcts. There is no mass effect or midline shift. Cerebellar  tonsils are above the foramen magnum. Pituitary and sella are not  enlarged. No abnormal susceptibility artifact.      Paranasal Sinuses and Mastoids: Visualized paranasal sinuses and  mastoid air cells are predominantly clear. Major intracranial flow  voids at the skull base are unremarkable.      Impression: No evidence of acute infarct, intracranial mass effect or midline  shift.      Moderate volume loss. Mild nonspecific white matter signal compatible  with microangiopathy.      MACRO:  None      Signed by: Lyudmila Thompson 4/11/2024 5:28 PM  Dictation workstation:   [id number]      Assessment/Plan      Generalized weakness  Limb tremor    The patient was admitted to regular medical floor for evaluation and treatment of side effects of Wellbutrin.  The patient had been prescribed Wellbutrin for anxiety and depression 2 weeks prior to admission.  Doses which is increased 1 week prior to admission.  She developed neurological signs and symptoms including akathisia anxiety ataxia hyperactive hyperkinetic muscle activity and  dyskinesias.  She was unable to ambulate.  The return was discontinued and all symptoms gradually improved and are resolving on discharge.  She is still unable to ambulate without assistance.  Neurology was consulted and MRI of lumbar spine was ordered with moderate degenerative disc disease and possible L3-L4 radiculopathy but patient has no signs or symptoms of lower extremity radiculopathy including no back pain leg pain or lower extremity numbness or weakness.  This is most likely an incidental finding.  Physical therapy and Occupational Therapy consulted and is recommend the patient be discharged to skilled nursing facility for short-term rehabilitation.  She will be discharged to skilled nursing facility when insurance precertification is completed.       Osmany Sheriff MD

## 2024-04-15 NOTE — PROGRESS NOTES
Physical Therapy    Physical Therapy Treatment    Patient Name: Carmen Cao  MRN: [id number]  Today's Date: 4/15/2024  Time Calculation  Start Time: 0720  Stop Time: 0750  Time Calculation (min): 30 min       Assessment/Plan   PT Assessment  End of Session Communication: Bedside nurse  Assessment Comment: Gait 15' x 2 with Min A STSwith Mod A difficult to STS from chair  End of Session Patient Position: Up in chair, Alarm on     PT Plan  Treatment/Interventions: Bed mobility, Gait training, Transfer training, Balance training, Neuromuscular re-education, Strengthening, Endurance training, Range of motion, Therapeutic exercise, Therapeutic activity, Home exercise program  PT Plan: Skilled PT  PT Frequency: 5 times per week  PT Discharge Recommendations: Moderate intensity level of continued care  Equipment Recommended upon Discharge: Wheeled walker  PT Recommended Transfer Status: Assist x1, Assistive device  PT - OK to Discharge: Yes      General Visit Information:   PT  Visit  PT Received On: 04/15/24  General  Reason for Referral: Impaired mobilty  Referred By: Dr Washington  Past Medical History Relevant to Rehab: HTN, obesity, OA, anxiety, depression, prediabetes  Family/Caregiver Present: No  Prior to Session Communication: Bedside nurse  Patient Position Received: Up in chair, Alarm on  Preferred Learning Style: verbal, visual  General Comment: Cleared by nurse. Patient agreeable to therapy    Subjective   Precautions:  Precautions  Medical Precautions: Fall precautions  Vital Signs:       Objective   Pain:  Pain Assessment  Pain Assessment: 0-10  Pain Score: 1  Pain Type: Chronic pain  Pain Location:  (Thigh)  Pain Orientation: Left  Pain Onset: Ongoing  Cognition:  Cognition  Overall Cognitive Status: Impaired  Orientation Level: Oriented X4  Short-Term Memory: Impaired  Insight: Mild  Impulsive: Mildly  Processing Speed: Delayed  Postural Control:     Extremity/Trunk Assessments:    Activity Tolerance:      Treatments:  Therapeutic Exercise  Therapeutic Exercise Performed: Yes  Therapeutic Exercise Activity 1: B LE seated ther ex: heel and toe raises,russell hip adducgtion, LAQs , hip flexionx 15 x 10 L LE due to thigh discomfort    Ambulation/Gait Training  Ambulation/Gait Training Performed: Yes  Ambulation/Gait Training 1  Surface 1: Level tile  Device 1: Rolling walker  Assistance 1: Minimum assistance  Quality of Gait 1: Narrow base of support, Shuffling gait, Diminished heel strike  Comments/Distance (ft) 1: 15'x 2 with RW with Min A with slow small shuffling steps with fear of falling.Seated rest bewtween walks  Transfers  Transfer: Yes  Transfer 1  Transfer From 1: Chair with arms to  Transfer to 1: Stand  Technique 1: Sit to stand  Transfer Device 1: Walker  Transfer Level of Assistance 1: Moderate assistance  Trials/Comments 1: x 2 traisl with Mod A with cues for safe hand placement  Transfers 2  Transfer From 2: Stand to  Transfer to 2: Sit  Technique 2: Stand to sit  Transfer Device 2: Walker  Transfer Level of Assistance 2: Moderate assistance  Trials/Comments 2: x 2 trails Mod A with cues for safe hand nplacement    Outcome Measures:  [facility] Basic Mobility  Turning from your back to your side while in a flat bed without using bedrails: A little  Moving from lying on your back to sitting on the side of a flat bed without using bedrails: A lot  Moving to and from bed to chair (including a wheelchair): A lot  Standing up from a chair using your arms (e.g. wheelchair or bedside chair): A lot  To walk in hospital room: A lot  Climbing 3-5 steps with railing: A lot  Basic Mobility - Total Score: 13    Education Documentation  Mobility Training, taught by Bre Lazaro PTA at 4/15/2024  8:40 AM.  Learner: Patient  Readiness: Acceptance  Method: Explanation, Teach-back  Response: Verbalizes Understanding, Needs Reinforcement    Education Comments  No comments found.        OP EDUCATION:       Encounter  Problems       Encounter Problems (Active)       Balance       Sitting Balance (Progressing)       Start:  04/11/24    Expected End:  04/25/24       Pt will demonstrate good sitting balance to promote safe engagement with out of bed activities           Standing Balance (Progressing)       Start:  04/11/24    Expected End:  04/25/24       Pt will demonstrate good static standing balance to promote safe participation with out of bed activity, transfers, and mobility              Mobility       Ambulation (Progressing)       Start:  04/11/24    Expected End:  04/25/24       Pt will ambulate 50' modified independent assist with walker to promote safe home mobility              PT Transfers       Supine to sit (Progressing)       Start:  04/11/24    Expected End:  04/25/24       Pt will transfer supine to sitting at edge of bed with modified independent assist to promote acute care out of bed activity           Sit to stand (Progressing)       Start:  04/11/24    Expected End:  04/25/24       Pt will transfer sit to standing position with modified independent assist and walker to promote safe out of bed activity           Bed to chair (Progressing)       Start:  04/11/24    Expected End:  04/25/24       Pt will transfer from sitting edge of bed to the chair with modified independent assist and walker to promote out of bed activity and reduce the risks of prolonged acute care bedrest              Pain - Adult          Safety       Safe Mobility Techniques (Progressing)       Start:  04/11/24    Expected End:  04/25/24       Pt will correctly identify and demonstrate safe mobility techniques to reduce their risks for falls during their acute care stay

## 2024-04-15 NOTE — NURSING NOTE
Notified Dr. Mathew about patients elevated BP, and asymptomatic. Dr. Mathew said to monitor the patient, no new orders at this time.      04/15/24 at 6:40 AM - Jessica Carvalho RN

## 2024-04-15 NOTE — DOCUMENTATION CLARIFICATION NOTE
"    PATIENT:               GEORGE REGAN  ACCT #:                  [id number]  MRN:                       [id number]  :                       1945  ADMIT DATE:       4/10/2024 9:16 PM  DISCH DATE:  RESPONDING PROVIDER #:        [id number]          PROVIDER RESPONSE TEXT:    Hypokalemia    CDI QUERY TEXT:    Clarification        Instruction:    Based on your assessment of the patient and the clinical information, please provide the requested documentation by clicking on the appropriate radio button and enter any additional information if prompted.    Question: Please further clarify after study the diagnosis of low potassium    When answering this query, please exercise your independent professional judgment. The fact that a question is being asked, does not imply that any particular answer is desired or expected.    The patient's clinical indicators include:  Clinical Information: 78 y.o. female presenting with difficulty ambulating.    Clinical Indicators:    Lab results: 03/10 K+ 3.3 mmol/l,  3.0 mmol/l    03/10: ED documentation: \"Potassium 3.3 otherwise CMP CBC okay CT head nothing acute UA pending EKG was not done\"    Treatment: monitor lab values    Risk Factors: lasix  Options provided:  -- Hypokalemia  -- Low potassium not clinically significant  -- Other - I will add my own diagnosis  -- Refer to Clinical Documentation Reviewer    Query created by: Ed Rivera on 4/15/2024 9:23 AM      Electronically signed by:  MALINDA DASH MD 4/15/2024 10:06 AM          "

## 2024-04-15 NOTE — CARE PLAN
Problem: Skin  Goal: Decreased wound size/increased tissue granulation at next dressing change  4/15/2024 0754 by Cheryl Zhang RN  Outcome: Progressing  4/15/2024 0750 by Cheryl Zhang RN  Outcome: Progressing  Goal: Participates in plan/prevention/treatment measures  4/15/2024 0754 by Cheryl Zhang RN  Outcome: Progressing  4/15/2024 0750 by Cheryl Zhang RN  Outcome: Progressing  Goal: Prevent/manage excess moisture  4/15/2024 0754 by Cheryl Zhang RN  Outcome: Progressing  4/15/2024 0750 by Cheryl Zhang RN  Outcome: Progressing  Goal: Prevent/minimize sheer/friction injuries  4/15/2024 0754 by Cheryl Zhang RN  Outcome: Progressing  4/15/2024 0750 by Cheryl Zhang RN  Outcome: Progressing  Goal: Promote/optimize nutrition  4/15/2024 0754 by Cheryl Zhang RN  Outcome: Progressing  4/15/2024 0750 by Cheryl Zhang RN  Outcome: Progressing  Goal: Promote skin healing  4/15/2024 0754 by Cheryl Zhang RN  Outcome: Progressing  4/15/2024 0750 by Cheryl Zhang RN  Outcome: Progressing   The patient's goals for the shift include      The clinical goals for the shift include safety

## 2024-04-15 NOTE — PROGRESS NOTES
04/15/24 1338   Discharge Planning   Patient expects to be discharged to: [facility]   Does the patient need discharge transport arranged? No   What day is the transport expected? 04/15/24   What time is the transport expected? 0200      Daughter, Thierno, to  patient at 2 pm to transport to [facility].  Referral to [facility] for 7000 form to be completed.

## 2024-04-15 NOTE — CARE PLAN
Problem: Skin  Goal: Decreased wound size/increased tissue granulation at next dressing change  Outcome: Progressing  Goal: Participates in plan/prevention/treatment measures  Outcome: Progressing  Goal: Prevent/manage excess moisture  Outcome: Progressing  Goal: Prevent/minimize sheer/friction injuries  Outcome: Progressing  Goal: Promote/optimize nutrition  Outcome: Progressing  Goal: Promote skin healing  Outcome: Progressing   The patient's goals for the shift include      The clinical goals for the shift include safety, and promote rest

## 2024-04-15 NOTE — PROGRESS NOTES
"Occupational Therapy    OT Treatment    Patient Name: Carmen Cao  MRN: [id number]  Today's Date: 4/15/2024  Time Calculation  Start Time: 1119  Stop Time: 1143  Time Calculation (min): 24 min         Assessment:  Evaluation/Treatment Tolerance: Patient limited by fatigue  End of Session Patient Position: Bed, 3 rail up, Alarm on (All needs in reach.)  OT Assessment Results: Decreased ADL status, Decreased endurance, Decreased functional mobility  Evaluation/Treatment Tolerance: Patient limited by fatigue  Plan:  Treatment Interventions: ADL retraining, Functional transfer training  OT Frequency: 3 times per week  OT Discharge Recommendations: Moderate intensity level of continued care  Equipment Recommended upon Discharge: Wheeled walker  OT Recommended Transfer Status: Assist of 1  OT - OK to Discharge: Yes  Treatment Interventions: ADL retraining, Functional transfer training    Subjective   Previous Visit Info:  OT Last Visit  OT Received On: 04/15/24  General:  General  Referred By: Dr Washington  Past Medical History Relevant to Rehab: HTN, obesity, OA, anxiety, depression, prediabetes  Prior to Session Communication: Bedside nurse  Patient Position Received: Up in chair, Alarm on  General Comment: Pt sleeping in chair, difficult to awake. Pt wanting to get back to bed.Pt asking \" Am I still at [facility]?\"  Precautions:  Medical Precautions: Fall precautions     Pain:  Pain Assessment  Pain Assessment: 0-10  Pain Score: 0 - No pain    Objective       Activities of Daily Living: Toileting  Toileting Level of Assistance: Maximum assistance  Where Assessed: Bedside commode  Toileting Comments: hygiene after urination, clothing management. Pt attempts to complete hygiene seated, unable d/t  BSC arms restricting side weight shifting  Functional Standing Tolerance:     Bed Mobility/Transfers: Bed Mobility 1  Bed Mobility 1: Sitting to supine  Level of Assistance 1: Moderate assistance  Bed Mobility Comments 1: cues " for bedrail use    Transfer 1  Transfer From 1: Chair with arms to  Transfer to 1: Commode-standard  Technique 1: To left  Transfer Device 1: Walker  Transfer Level of Assistance 1: Minimum assistance  Trials/Comments 1: Pt cued for hand placement, body mechanics, needing 2 trials to acheive full upright standing, cues to transition hands from chair arms to walker.  Transfers 2  Transfer From 2: Commode-standard to  Transfer to 2: Bed  Technique 2: To right  Transfer Device 2: Walker  Transfer Level of Assistance 2: Minimum assistance  Trials/Comments 2: Pt able to complete sit to stand with 1st attempt from increased height of BSC. Min A ~ 4 feet from BSC>bed, presents with very short step length, slow, cautious mobility, required extended amount of time to complete with cues to back up fully to bed.    Outcome Measures:[facility] Daily Activity  Putting on and taking off regular lower body clothing: A lot  Bathing (including washing, rinsing, drying): A lot  Putting on and taking off regular upper body clothing: A little  Toileting, which includes using toilet, bedpan or urinal: A lot  Taking care of personal grooming such as brushing teeth: None  Eating Meals: None  Daily Activity - Total Score: 17        Education Documentation  ADL Training, taught by MARTINE Horowitz at 4/15/2024 12:00 PM.  Learner: Patient  Readiness: Acceptance  Method: Explanation  Response: Verbalizes Understanding, Demonstrated Understanding, Needs Reinforcement    Education Comments  No comments found.         Goals:  Encounter Problems       Encounter Problems (Active)       OT Goals       ADLs (Progressing)       Start:  04/11/24    Expected End:  05/02/24       Pt will complete bathing, dressing, and toileting tasks with min assist using adaptive aides and with increased time as needed.         Functional transfers (Progressing)       Start:  04/11/24    Expected End:  05/02/24       Pt will complete toilet, bed, and chair transfers with  close supervision from elevated seat heights and using bilateral arm supports.         Activity tolerance (Progressing)       Start:  04/11/24    Expected End:  05/02/24       Pt will participate in 25 minutes of therapeutic activity in order to increase functional endurance needed for ADLs.

## 2024-04-17 ENCOUNTER — NURSING HOME VISIT (OUTPATIENT)
Dept: POST ACUTE CARE | Facility: EXTERNAL LOCATION | Age: 79
End: 2024-04-17
Payer: COMMERCIAL

## 2024-04-17 DIAGNOSIS — E03.8 HYPOTHYROIDISM DUE TO HASHIMOTO'S THYROIDITIS: ICD-10-CM

## 2024-04-17 DIAGNOSIS — R26.9 GAIT DISORDER: Primary | ICD-10-CM

## 2024-04-17 DIAGNOSIS — E06.3 HYPOTHYROIDISM DUE TO HASHIMOTO'S THYROIDITIS: ICD-10-CM

## 2024-04-17 DIAGNOSIS — I10 PRIMARY HYPERTENSION: ICD-10-CM

## 2024-04-17 DIAGNOSIS — F32.A DEPRESSION, UNSPECIFIED DEPRESSION TYPE: ICD-10-CM

## 2024-04-17 DIAGNOSIS — R41.81 AGE-RELATED COGNITIVE DECLINE: ICD-10-CM

## 2024-04-17 DIAGNOSIS — F41.9 ANXIETY: ICD-10-CM

## 2024-04-17 PROCEDURE — 99306 1ST NF CARE HIGH MDM 50: CPT | Performed by: INTERNAL MEDICINE

## 2024-04-17 ASSESSMENT — ENCOUNTER SYMPTOMS
FEVER: 0
SHORTNESS OF BREATH: 0
ABDOMINAL PAIN: 0

## 2024-04-17 NOTE — LETTER
Patient: Carmen Cao  : 1945    Encounter Date: 2024    HISTORY AND PHYSICAL    History of present illness:    Carmen Cao is a 78 y.o. female admitted to SNF after hospitalization for gait instability and muscle jerking.  Was started on Wellbutrin 150 mg, titrated up to 300 mg after a week, she started to have myoclonic activity, akathisia.  The Wellbutrin was stopped, slowly she improved.  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  She seems to be doing better, is asleep but easily wakes this morning.  Denies pain.  Nurse says that she has not had any complaints or issues.  Appetite is fair.  Bowels are moving.    Past Medical History:   Diagnosis Date   • Arthritis     left knee   • CHF (congestive heart failure) (Multi)    • Depression         No past surgical history on file.     No family history on file.    Social History     Socioeconomic History   • Marital status:      Spouse name: Not on file   • Number of children: Not on file   • Years of education: Not on file   • Highest education level: Not on file   Occupational History   • Not on file   Tobacco Use   • Smoking status: Never     Passive exposure: Never   • Smokeless tobacco: Never   Vaping Use   • Vaping status: Never Used   Substance and Sexual Activity   • Alcohol use: Never   • Drug use: Never   • Sexual activity: Not on file   Other Topics Concern   • Not on file   Social History Narrative   • Not on file     Social Determinants of Health     Financial Resource Strain: Patient Unable To Answer (2024)    Overall Financial Resource Strain (CARDIA)    • Difficulty of Paying Living Expenses: Patient unable to answer   Food Insecurity: No Food Insecurity (2024)    Hunger Vital Sign    • Worried About Running Out of Food in the Last Year: Never true    • Ran Out of Food in the Last Year: Never true   Transportation Needs: No  Transportation Needs (4/11/2024)    PRAPARE - Transportation    • Lack of Transportation (Medical): No    • Lack of Transportation (Non-Medical): No   Physical Activity: Inactive (4/12/2024)    Exercise Vital Sign    • Days of Exercise per Week: 0 days    • Minutes of Exercise per Session: 0 min   Stress: No Stress Concern Present (4/12/2024)    [employer] - Occupational Stress Questionnaire    • Feeling of Stress : Not at all   Social Connections: Unknown (4/12/2024)    Social Connection and Isolation Panel [NHANES]    • Frequency of Communication with Friends and Family: More than three times a week    • Frequency of Social Gatherings with Friends and Family: More than three times a week    • Attends [employer] Services: Patient declined    • Active Member of Clubs or Organizations: Patient declined    • Attends Club or Organization Meetings: Never    • Marital Status:    Intimate Partner Violence: Not At Risk (4/12/2024)    Humiliation, Afraid, Rape, and Kick questionnaire    • Fear of Current or Ex-Partner: No    • Emotionally Abused: No    • Physically Abused: No    • Sexually Abused: No   Housing Stability: Unknown (4/12/2024)    Housing Stability Vital Sign    • Unable to Pay for Housing in the Last Year: Patient unable to answer    • Number of Places Lived in the Last Year: 1    • Unstable Housing in the Last Year: No       Review of Systems   Constitutional:  Negative for fever.   Respiratory:  Negative for shortness of breath.    Cardiovascular:  Negative for chest pain.   Gastrointestinal:  Negative for abdominal pain.   All other systems reviewed and are negative.       Objective  Vital signs reviewed in [facility].    Physical Exam  Vitals reviewed.   Constitutional:       Appearance: Normal appearance.   Cardiovascular:      Rate and Rhythm: Normal rate and regular rhythm.      Heart sounds: No murmur heard.  Pulmonary:      Breath sounds: Normal breath sounds.  No wheezing, rhonchi or rales.   Musculoskeletal:      Right lower leg: No edema.      Left lower leg: No edema.       Assessment/Plan  Diagnoses and all orders for this visit:  Gait disorder (Primary)  Hypothyroidism due to Hashimoto's thyroiditis  Anxiety  Depression, unspecified depression type  Age-related cognitive decline  Primary hypertension    Gait instability/possible drug adverse reaction - off bupropion.  PT, OT.  Depression/anxiety - cont SSRI for now  Hypokalemia - replete, recheck  OA - Tylenol, ibuprofen prn  Hypothyroid - Cont levothyroxine   HPL - Cont simvastatin    The essential elements of the hospital History and Physical as well as the Discharge Summary have been confirmed to the best of my ability by means of interviewing the patient plus a review of the hospital records. Please note that this entry was created in a shared electronic medical record.     All available hospital and outpatient records have been reviewed. Will continue other current drug therapies as ordered on the continuation of care documents. Physical and Occupational Therapy will assess and treat per POC. Analgesia for identified pain symptoms. Continue the various medicines for bowel and bladder symptoms as well as the vitamins and supplements per hospital instructions. Will assess and provide local care to abnormalities of skin integrity. Drug to drug interaction data as noted by the pharmacy has been reviewed. The patient's condition warrants the continuation of these drugs as prescribed by the medical specialists. Discharge planning will be coordinated through the  department. I have reviewed any advanced directive documentation that is contained in the admission packet as directives indicating whether a surrogate decision maker has been identified.       Electronically Signed By: Joe Mata MD   4/17/24 10:25 AM

## 2024-04-17 NOTE — PROGRESS NOTES
HISTORY AND PHYSICAL    History of present illness:    Carmen Cao is a 78 y.o. female admitted to SNF after hospitalization for gait instability and muscle jerking.  Was started on Wellbutrin 150 mg, titrated up to 300 mg after a week, she started to have myoclonic activity, akathisia.  The Wellbutrin was stopped, slowly she improved.  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  She seems to be doing better, is asleep but easily wakes this morning.  Denies pain.  Nurse says that she has not had any complaints or issues.  Appetite is fair.  Bowels are moving.    Past Medical History:   Diagnosis Date    Arthritis     left knee    CHF (congestive heart failure) (Multi)     Depression         No past surgical history on file.     No family history on file.    Social History     Socioeconomic History    Marital status:      Spouse name: Not on file    Number of children: Not on file    Years of education: Not on file    Highest education level: Not on file   Occupational History    Not on file   Tobacco Use    Smoking status: Never     Passive exposure: Never    Smokeless tobacco: Never   Vaping Use    Vaping status: Never Used   Substance and Sexual Activity    Alcohol use: Never    Drug use: Never    Sexual activity: Not on file   Other Topics Concern    Not on file   Social History Narrative    Not on file     Social Determinants of Health     Financial Resource Strain: Patient Unable To Answer (4/11/2024)    Overall Financial Resource Strain (CARDIA)     Difficulty of Paying Living Expenses: Patient unable to answer   Food Insecurity: No Food Insecurity (4/12/2024)    Hunger Vital Sign     Worried About Running Out of Food in the Last Year: Never true     Ran Out of Food in the Last Year: Never true   Transportation Needs: No Transportation Needs (4/11/2024)    PRAPARE - Transportation     Lack of Transportation  (Medical): No     Lack of Transportation (Non-Medical): No   Physical Activity: Inactive (4/12/2024)    Exercise Vital Sign     Days of Exercise per Week: 0 days     Minutes of Exercise per Session: 0 min   Stress: No Stress Concern Present (4/12/2024)    [employer] - Occupational Stress Questionnaire     Feeling of Stress : Not at all   Social Connections: Unknown (4/12/2024)    Social Connection and Isolation Panel [NHANES]     Frequency of Communication with Friends and Family: More than three times a week     Frequency of Social Gatherings with Friends and Family: More than three times a week     Attends Restoration Services: Patient declined     Active Member of Clubs or Organizations: Patient declined     Attends Club or Organization Meetings: Never     Marital Status:    Intimate Partner Violence: Not At Risk (4/12/2024)    Humiliation, Afraid, Rape, and Kick questionnaire     Fear of Current or Ex-Partner: No     Emotionally Abused: No     Physically Abused: No     Sexually Abused: No   Housing Stability: Unknown (4/12/2024)    Housing Stability Vital Sign     Unable to Pay for Housing in the Last Year: Patient unable to answer     Number of Places Lived in the Last Year: 1     Unstable Housing in the Last Year: No       Review of Systems   Constitutional:  Negative for fever.   Respiratory:  Negative for shortness of breath.    Cardiovascular:  Negative for chest pain.   Gastrointestinal:  Negative for abdominal pain.   All other systems reviewed and are negative.       Objective   Vital signs reviewed in [facility].    Physical Exam  Vitals reviewed.   Constitutional:       Appearance: Normal appearance.   Cardiovascular:      Rate and Rhythm: Normal rate and regular rhythm.      Heart sounds: No murmur heard.  Pulmonary:      Breath sounds: Normal breath sounds. No wheezing, rhonchi or rales.   Musculoskeletal:      Right lower leg: No edema.      Left lower leg:  No edema.       Assessment/Plan   Diagnoses and all orders for this visit:  Gait disorder (Primary)  Hypothyroidism due to Hashimoto's thyroiditis  Anxiety  Depression, unspecified depression type  Age-related cognitive decline  Primary hypertension    Gait instability/possible drug adverse reaction - off bupropion.  PT, OT.  Depression/anxiety - cont SSRI for now  Hypokalemia - replete, recheck  OA - Tylenol, ibuprofen prn  Hypothyroid - Cont levothyroxine   HPL - Cont simvastatin    The essential elements of the hospital History and Physical as well as the Discharge Summary have been confirmed to the best of my ability by means of interviewing the patient plus a review of the hospital records. Please note that this entry was created in a shared electronic medical record.     All available hospital and outpatient records have been reviewed. Will continue other current drug therapies as ordered on the continuation of care documents. Physical and Occupational Therapy will assess and treat per POC. Analgesia for identified pain symptoms. Continue the various medicines for bowel and bladder symptoms as well as the vitamins and supplements per hospital instructions. Will assess and provide local care to abnormalities of skin integrity. Drug to drug interaction data as noted by the pharmacy has been reviewed. The patient's condition warrants the continuation of these drugs as prescribed by the medical specialists. Discharge planning will be coordinated through the  department. I have reviewed any advanced directive documentation that is contained in the admission packet as directives indicating whether a surrogate decision maker has been identified.

## 2024-04-18 ENCOUNTER — APPOINTMENT (OUTPATIENT)
Dept: PRIMARY CARE | Facility: CLINIC | Age: 79
End: 2024-04-18
Payer: COMMERCIAL

## 2024-04-18 ENCOUNTER — NURSING HOME VISIT (OUTPATIENT)
Dept: POST ACUTE CARE | Facility: EXTERNAL LOCATION | Age: 79
End: 2024-04-18
Payer: COMMERCIAL

## 2024-04-18 VITALS
HEART RATE: 66 BPM | DIASTOLIC BLOOD PRESSURE: 72 MMHG | RESPIRATION RATE: 18 BRPM | SYSTOLIC BLOOD PRESSURE: 148 MMHG | BODY MASS INDEX: 33.12 KG/M2 | WEIGHT: 169.6 LBS | OXYGEN SATURATION: 94 % | TEMPERATURE: 98.2 F

## 2024-04-18 DIAGNOSIS — F41.9 ANXIETY: ICD-10-CM

## 2024-04-18 DIAGNOSIS — R41.81 AGE-RELATED COGNITIVE DECLINE: ICD-10-CM

## 2024-04-18 DIAGNOSIS — I10 PRIMARY HYPERTENSION: ICD-10-CM

## 2024-04-18 DIAGNOSIS — R26.9 GAIT DISORDER: Primary | ICD-10-CM

## 2024-04-18 DIAGNOSIS — E87.6 HYPOKALEMIA: ICD-10-CM

## 2024-04-18 DIAGNOSIS — E03.9 HYPOTHYROIDISM, UNSPECIFIED TYPE: ICD-10-CM

## 2024-04-18 PROCEDURE — 99309 SBSQ NF CARE MODERATE MDM 30: CPT | Performed by: PHYSICIAN ASSISTANT

## 2024-04-18 ASSESSMENT — ENCOUNTER SYMPTOMS
SHORTNESS OF BREATH: 0
CONSTIPATION: 0
NAUSEA: 0
NERVOUS/ANXIOUS: 0
CONFUSION: 0
FEVER: 0
COUGH: 0
DIZZINESS: 0
CHILLS: 0
WEAKNESS: 0
ABDOMINAL PAIN: 0
DIARRHEA: 0
VOMITING: 0
APPETITE CHANGE: 0
FREQUENCY: 0
HEADACHES: 0
WHEEZING: 0
DYSURIA: 0
HEMATURIA: 0
TREMORS: 0

## 2024-04-18 NOTE — ASSESSMENT & PLAN NOTE
Potassium 3.3 on today's labs.  Magnesium in normal range.  Started on potassium chloride 20 mEQ daily.   Bmp on Monday.

## 2024-04-18 NOTE — LETTER
Patient: Carmen Cao  : 1945    Encounter Date: 2024      Subjective  [id number] : Carmen Cao is a 78 y.o. female admitted to [facility] for rehab.   HPI  Pt with h/o CHF, depression/anxiety, HTN and age related cognitive decline admitted with gait instability and muscle jerking.  Pt symptoms felt to be side effect of wellbutrin.   She was taken off this medication  and her symptoms improved.  She reports that she continues to have some weakness and difficulty ambulating.   She was seen by neurology and had MRI which showed lumbar spine stenosis.   She denies any shortness of breath or cough.  She reports a good appetite.  No abdominal pain.  No n/v/d/c.  NO lower leg edema.   Pt lives with her spouse.  Code status is full code.   Review of Systems   Constitutional:  Negative for appetite change, chills and fever.   Respiratory:  Negative for cough, shortness of breath and wheezing.    Cardiovascular:  Negative for chest pain and leg swelling.   Gastrointestinal:  Negative for abdominal pain, constipation, diarrhea, nausea and vomiting.   Genitourinary:  Negative for dysuria, frequency and hematuria.   Neurological:  Negative for dizziness, tremors, weakness and headaches.   Psychiatric/Behavioral:  Negative for confusion. The patient is not nervous/anxious.    All other systems reviewed and are negative.      Objective  /72   Pulse 66   Temp 36.8 °C (98.2 °F)   Resp 18   Wt 76.9 kg (169 lb 9.6 oz)   SpO2 94%   BMI 33.12 kg/m²    Physical Exam  Constitutional:       General: She is not in acute distress.  Eyes:      Conjunctiva/sclera: Conjunctivae normal.      Pupils: Pupils are equal, round, and reactive to light.   Cardiovascular:      Rate and Rhythm: Normal rate and regular rhythm.      Heart sounds: No murmur heard.  Pulmonary:      Effort: Pulmonary effort is normal.      Breath sounds: No wheezing, rhonchi or rales.   Abdominal:      General: Abdomen is flat. Bowel sounds  "are normal. There is no distension.      Palpations: Abdomen is soft. There is no mass.      Tenderness: There is no abdominal tenderness.   Musculoskeletal:         General: No swelling. Normal range of motion.   Skin:     General: Skin is warm and dry.      Findings: No rash.   Neurological:      General: No focal deficit present.      Mental Status: She is alert and oriented to person, place, and time. Mental status is at baseline.       No lab exists for component: \"CBC BMP\"  Assessment/Plan  Problem List Items Addressed This Visit             ICD-10-CM    Age-related cognitive decline R41.81    Anxiety F41.9     Continue paxil.   Taken off wellbutrin due to side effects.          Hypothyroidism E03.9     Continue synthroid         HTN (hypertension) I10     Monitor blood pressures and adjust meds as needed         Gait disorder - Primary R26.9     Continue PT/OT         Hypokalemia E87.6     Potassium 3.3 on today's labs.  Magnesium in normal range.  Started on potassium chloride 20 mEQ daily.   Bmp on Monday.                 Time spent: 30 min in review of chart, labs and orders, consultation with pt and documentation.       Electronically Signed By: Catherine Gresham PA-C   4/18/24  9:16 PM  "

## 2024-04-18 NOTE — PROGRESS NOTES
Subjective   [id number] : Carmen Cao is a 78 y.o. female admitted to [facility] for rehab.   HPI  Pt with h/o CHF, depression/anxiety, HTN and age related cognitive decline admitted with gait instability and muscle jerking.  Pt symptoms felt to be side effect of wellbutrin.   She was taken off this medication  and her symptoms improved.  She reports that she continues to have some weakness and difficulty ambulating.   She was seen by neurology and had MRI which showed lumbar spine stenosis.   She denies any shortness of breath or cough.  She reports a good appetite.  No abdominal pain.  No n/v/d/c.  NO lower leg edema.   Pt lives with her spouse.  Code status is full code.   Review of Systems   Constitutional:  Negative for appetite change, chills and fever.   Respiratory:  Negative for cough, shortness of breath and wheezing.    Cardiovascular:  Negative for chest pain and leg swelling.   Gastrointestinal:  Negative for abdominal pain, constipation, diarrhea, nausea and vomiting.   Genitourinary:  Negative for dysuria, frequency and hematuria.   Neurological:  Negative for dizziness, tremors, weakness and headaches.   Psychiatric/Behavioral:  Negative for confusion. The patient is not nervous/anxious.    All other systems reviewed and are negative.      Objective   /72   Pulse 66   Temp 36.8 °C (98.2 °F)   Resp 18   Wt 76.9 kg (169 lb 9.6 oz)   SpO2 94%   BMI 33.12 kg/m²    Physical Exam  Constitutional:       General: She is not in acute distress.  Eyes:      Conjunctiva/sclera: Conjunctivae normal.      Pupils: Pupils are equal, round, and reactive to light.   Cardiovascular:      Rate and Rhythm: Normal rate and regular rhythm.      Heart sounds: No murmur heard.  Pulmonary:      Effort: Pulmonary effort is normal.      Breath sounds: No wheezing, rhonchi or rales.   Abdominal:      General: Abdomen is flat. Bowel sounds are normal. There is no distension.      Palpations: Abdomen is soft.  "There is no mass.      Tenderness: There is no abdominal tenderness.   Musculoskeletal:         General: No swelling. Normal range of motion.   Skin:     General: Skin is warm and dry.      Findings: No rash.   Neurological:      General: No focal deficit present.      Mental Status: She is alert and oriented to person, place, and time. Mental status is at baseline.       No lab exists for component: \"CBC BMP\"  Assessment/Plan   Problem List Items Addressed This Visit             ICD-10-CM    Age-related cognitive decline R41.81    Anxiety F41.9     Continue paxil.   Taken off wellbutrin due to side effects.          Hypothyroidism E03.9     Continue synthroid         HTN (hypertension) I10     Monitor blood pressures and adjust meds as needed         Gait disorder - Primary R26.9     Continue PT/OT         Hypokalemia E87.6     Potassium 3.3 on today's labs.  Magnesium in normal range.  Started on potassium chloride 20 mEQ daily.   Bmp on Monday.                 Time spent: 30 min in review of chart, labs and orders, consultation with pt and documentation.   "

## 2024-04-22 ENCOUNTER — NURSING HOME VISIT (OUTPATIENT)
Dept: POST ACUTE CARE | Facility: EXTERNAL LOCATION | Age: 79
End: 2024-04-22
Payer: COMMERCIAL

## 2024-04-22 VITALS
DIASTOLIC BLOOD PRESSURE: 93 MMHG | TEMPERATURE: 98.3 F | SYSTOLIC BLOOD PRESSURE: 171 MMHG | OXYGEN SATURATION: 94 % | WEIGHT: 169.6 LBS | RESPIRATION RATE: 18 BRPM | HEART RATE: 79 BPM | BODY MASS INDEX: 33.12 KG/M2

## 2024-04-22 DIAGNOSIS — R41.81 AGE-RELATED COGNITIVE DECLINE: ICD-10-CM

## 2024-04-22 DIAGNOSIS — E03.9 HYPOTHYROIDISM, UNSPECIFIED TYPE: ICD-10-CM

## 2024-04-22 DIAGNOSIS — E87.6 HYPOKALEMIA: ICD-10-CM

## 2024-04-22 DIAGNOSIS — F41.9 ANXIETY: ICD-10-CM

## 2024-04-22 DIAGNOSIS — R26.9 GAIT DISORDER: Primary | ICD-10-CM

## 2024-04-22 DIAGNOSIS — I10 PRIMARY HYPERTENSION: ICD-10-CM

## 2024-04-22 PROCEDURE — 99309 SBSQ NF CARE MODERATE MDM 30: CPT | Performed by: PHYSICIAN ASSISTANT

## 2024-04-22 ASSESSMENT — ENCOUNTER SYMPTOMS
WEAKNESS: 0
NERVOUS/ANXIOUS: 0
TREMORS: 0
DYSURIA: 0
WHEEZING: 0
COUGH: 0
CONSTIPATION: 0
FEVER: 0
FREQUENCY: 0
HEMATURIA: 0
HEADACHES: 0
NAUSEA: 0
SHORTNESS OF BREATH: 0
DIZZINESS: 0
APPETITE CHANGE: 0
DIARRHEA: 0
CHILLS: 0
ABDOMINAL PAIN: 0
VOMITING: 0
CONFUSION: 0

## 2024-04-22 NOTE — LETTER
Patient: Carmen Cao  : 1945    Encounter Date: 2024      Subjective  [id number] : Carmen Cao is a 78 y.o. female admitted to [facility] for rehab.   HPI  Pt with h/o CHF, depression/anxiety, HTN and age related cognitive decline admitted with gait instability and muscle jerking due to welbutrin side effect.   Pt seen while sitting up in wheelchair.  She is alert, pleasant and interactive.  She offers no new complaints or concerns.  Pt's blood pressures noted to be elevated since admission.  She is currently not on any bp medications.   She continues on lasix.  She has no lower leg edema.   She denies any shortness of breath or cough.  She reports a good appetite.  No abdominal pain.  No n/v/d/c.  NO lower leg edema.   Pt lives with her spouse.  Code status is full code.   Review of Systems   Constitutional:  Negative for appetite change, chills and fever.   Respiratory:  Negative for cough, shortness of breath and wheezing.    Cardiovascular:  Negative for chest pain and leg swelling.   Gastrointestinal:  Negative for abdominal pain, constipation, diarrhea, nausea and vomiting.   Genitourinary:  Negative for dysuria, frequency and hematuria.   Neurological:  Negative for dizziness, tremors, weakness and headaches.   Psychiatric/Behavioral:  Negative for confusion. The patient is not nervous/anxious.    All other systems reviewed and are negative.      Objective  BP (!) 171/93   Pulse 79   Temp 36.8 °C (98.3 °F)   Resp 18   Wt 76.9 kg (169 lb 9.6 oz)   SpO2 94%   BMI 33.12 kg/m²    Physical Exam  Constitutional:       General: She is not in acute distress.  Eyes:      Conjunctiva/sclera: Conjunctivae normal.      Pupils: Pupils are equal, round, and reactive to light.   Cardiovascular:      Rate and Rhythm: Normal rate and regular rhythm.      Heart sounds: No murmur heard.  Pulmonary:      Effort: Pulmonary effort is normal.      Breath sounds: No wheezing, rhonchi or rales.  "  Abdominal:      General: Abdomen is flat. Bowel sounds are normal. There is no distension.      Palpations: Abdomen is soft. There is no mass.      Tenderness: There is no abdominal tenderness.   Musculoskeletal:         General: No swelling. Normal range of motion.   Skin:     General: Skin is warm and dry.      Findings: No rash.   Neurological:      General: No focal deficit present.      Mental Status: She is alert and oriented to person, place, and time. Mental status is at baseline.       No lab exists for component: \"CBC BMP\"  Assessment/Plan  Age-related cognitive decline R41.81      Anxiety F41.9       Continue paxil.   Taken off wellbutrin due to side effects.            Hypothyroidism E03.9       Continue synthroid           HTN (hypertension) I10       Blood pressures elevated - add norvasc 5mg daily.  Monitor blood pressures and adjust meds as needed           Gait disorder - Primary R26.9       Continue PT/OT           Hypokalemia E87.6       Potassium 3.7 on today's labs.  Magnesium in normal range.  Continue  potassium chloride 20 mEQ daily.   Monitor weekly Monday.                    Time spent: 30 min in review of chart, labs and orders, consultation with pt and documentation.       Electronically Signed By: Catherine Gresham PA-C   4/22/24  6:44 PM  "

## 2024-04-22 NOTE — PROGRESS NOTES
Subjective   [id number] : Carmen Cao is a 78 y.o. female admitted to [facility] for rehab.   HPI  Pt with h/o CHF, depression/anxiety, HTN and age related cognitive decline admitted with gait instability and muscle jerking due to welbutrin side effect.   Pt seen while sitting up in wheelchair.  She is alert, pleasant and interactive.  She offers no new complaints or concerns.  Pt's blood pressures noted to be elevated since admission.  She is currently not on any bp medications.   She continues on lasix.  She has no lower leg edema.   She denies any shortness of breath or cough.  She reports a good appetite.  No abdominal pain.  No n/v/d/c.  NO lower leg edema.   Pt lives with her spouse.  Code status is full code.   Review of Systems   Constitutional:  Negative for appetite change, chills and fever.   Respiratory:  Negative for cough, shortness of breath and wheezing.    Cardiovascular:  Negative for chest pain and leg swelling.   Gastrointestinal:  Negative for abdominal pain, constipation, diarrhea, nausea and vomiting.   Genitourinary:  Negative for dysuria, frequency and hematuria.   Neurological:  Negative for dizziness, tremors, weakness and headaches.   Psychiatric/Behavioral:  Negative for confusion. The patient is not nervous/anxious.    All other systems reviewed and are negative.      Objective   BP (!) 171/93   Pulse 79   Temp 36.8 °C (98.3 °F)   Resp 18   Wt 76.9 kg (169 lb 9.6 oz)   SpO2 94%   BMI 33.12 kg/m²    Physical Exam  Constitutional:       General: She is not in acute distress.  Eyes:      Conjunctiva/sclera: Conjunctivae normal.      Pupils: Pupils are equal, round, and reactive to light.   Cardiovascular:      Rate and Rhythm: Normal rate and regular rhythm.      Heart sounds: No murmur heard.  Pulmonary:      Effort: Pulmonary effort is normal.      Breath sounds: No wheezing, rhonchi or rales.   Abdominal:      General: Abdomen is flat. Bowel sounds are normal. There is no  "distension.      Palpations: Abdomen is soft. There is no mass.      Tenderness: There is no abdominal tenderness.   Musculoskeletal:         General: No swelling. Normal range of motion.   Skin:     General: Skin is warm and dry.      Findings: No rash.   Neurological:      General: No focal deficit present.      Mental Status: She is alert and oriented to person, place, and time. Mental status is at baseline.       No lab exists for component: \"CBC BMP\"  Assessment/Plan   Age-related cognitive decline R41.81      Anxiety F41.9       Continue paxil.   Taken off wellbutrin due to side effects.            Hypothyroidism E03.9       Continue synthroid           HTN (hypertension) I10       Blood pressures elevated - add norvasc 5mg daily.  Monitor blood pressures and adjust meds as needed           Gait disorder - Primary R26.9       Continue PT/OT           Hypokalemia E87.6       Potassium 3.7 on today's labs.  Magnesium in normal range.  Continue  potassium chloride 20 mEQ daily.   Monitor weekly Monday.                    Time spent: 30 min in review of chart, labs and orders, consultation with pt and documentation.   "

## 2024-04-26 ENCOUNTER — NURSING HOME VISIT (OUTPATIENT)
Dept: POST ACUTE CARE | Facility: EXTERNAL LOCATION | Age: 79
End: 2024-04-26
Payer: COMMERCIAL

## 2024-04-26 VITALS
HEART RATE: 81 BPM | OXYGEN SATURATION: 95 % | DIASTOLIC BLOOD PRESSURE: 90 MMHG | BODY MASS INDEX: 33.12 KG/M2 | SYSTOLIC BLOOD PRESSURE: 158 MMHG | RESPIRATION RATE: 18 BRPM | TEMPERATURE: 97.7 F | WEIGHT: 169.6 LBS

## 2024-04-26 DIAGNOSIS — E87.6 HYPOKALEMIA: ICD-10-CM

## 2024-04-26 DIAGNOSIS — F41.9 ANXIETY: ICD-10-CM

## 2024-04-26 DIAGNOSIS — E03.9 HYPOTHYROIDISM, UNSPECIFIED TYPE: ICD-10-CM

## 2024-04-26 DIAGNOSIS — R26.9 GAIT DISORDER: Primary | ICD-10-CM

## 2024-04-26 DIAGNOSIS — R41.81 AGE-RELATED COGNITIVE DECLINE: ICD-10-CM

## 2024-04-26 PROCEDURE — 99309 SBSQ NF CARE MODERATE MDM 30: CPT | Performed by: PHYSICIAN ASSISTANT

## 2024-04-26 ASSESSMENT — ENCOUNTER SYMPTOMS
NERVOUS/ANXIOUS: 0
HEADACHES: 0
CONFUSION: 0
CHILLS: 0
FEVER: 0
DYSURIA: 0
COUGH: 0
VOMITING: 0
APPETITE CHANGE: 0
WEAKNESS: 0
HEMATURIA: 0
ABDOMINAL PAIN: 0
SHORTNESS OF BREATH: 0
WHEEZING: 0
FREQUENCY: 0
DIZZINESS: 0
TREMORS: 0
NAUSEA: 0
DIARRHEA: 0
CONSTIPATION: 0

## 2024-04-26 NOTE — LETTER
Patient: Carmen Cao  : 1945    Encounter Date: 2024      Subjective  [id number] : Carmen Cao is a 78 y.o. female admitted to [facility] for rehab.   HPI  Pt with h/o CHF, depression/anxiety, HTN and age related cognitive decline admitted with gait instability and muscle jerking due to welbutrin side effect.   Pt seen while sitting up in wheelchair.  Nursing reports that daughter is concerned about increased lower leg edema.  Pt is on lasix 40mg daily.   Pt does have a very small amount of swelling in the left foot.   Pt was started on norvasc this week for her blood pressures and could be a side effect of this medication.  Daughter has asked that lasix be increased.  No shortness of breath or cough is noted.   She is alert, pleasant and interactive.  She offers no new complaints or concerns.  No abdominal pain.  No n/v/d/c.  NO lower leg edema.   Pt lives with her spouse.  Code status is full code.   Review of Systems   Constitutional:  Negative for appetite change, chills and fever.   Respiratory:  Negative for cough, shortness of breath and wheezing.    Cardiovascular:  Positive for leg swelling. Negative for chest pain.   Gastrointestinal:  Negative for abdominal pain, constipation, diarrhea, nausea and vomiting.   Genitourinary:  Negative for dysuria, frequency and hematuria.   Neurological:  Negative for dizziness, tremors, weakness and headaches.   Psychiatric/Behavioral:  Negative for confusion. The patient is not nervous/anxious.    All other systems reviewed and are negative.      Objective  /90   Pulse 81   Temp 36.5 °C (97.7 °F)   Resp 18   Wt 76.9 kg (169 lb 9.6 oz)   SpO2 95%   BMI 33.12 kg/m²    Physical Exam  Constitutional:       General: She is not in acute distress.  Eyes:      Conjunctiva/sclera: Conjunctivae normal.      Pupils: Pupils are equal, round, and reactive to light.   Cardiovascular:      Rate and Rhythm: Normal rate and regular rhythm.      Heart  "sounds: No murmur heard.  Pulmonary:      Effort: Pulmonary effort is normal.      Breath sounds: No wheezing, rhonchi or rales.   Abdominal:      General: Abdomen is flat. Bowel sounds are normal. There is no distension.      Palpations: Abdomen is soft. There is no mass.      Tenderness: There is no abdominal tenderness.   Musculoskeletal:         General: No swelling. Normal range of motion.      Left lower leg: Edema (1+ edema in the left foot.) present.   Skin:     General: Skin is warm and dry.      Findings: No rash.   Neurological:      General: No focal deficit present.      Mental Status: She is alert and oriented to person, place, and time. Mental status is at baseline.       No lab exists for component: \"CBC BMP\"  Assessment/Plan  Age-related cognitive decline R41.81      Anxiety F41.9       Continue paxil.   Taken off wellbutrin due to side effects.            Hypothyroidism E03.9       Continue synthroid           HTN (hypertension) I10       Blood pressures elevated - mild lower leg edema  - possible side effect of norvasc.  Stop norvasc - start losartan for HTN.   Monitor blood pressures and adjust meds as needed           Gait disorder - Primary R26.9       Continue PT/OT           Hypokalemia E87.6       Potassium 3.7 on today's labs.  Magnesium in normal range.  Continue  potassium chloride 20 mEQ daily.   Monitor weekly Monday.          Lower leg edema:  mild edema noted.   Add daily weights.   Increase lasix to 60mg daily.  Monitor labs - bmp on Monday.  Have also changed the norvasc to losartan.  Gaurav hose to lower extremities daily.           Time spent: 30 min in review of chart, labs and orders, consultation with pt and documentation.       Electronically Signed By: Catherine Gresham PA-C   4/26/24 12:58 PM  "

## 2024-04-26 NOTE — PROGRESS NOTES
Subjective   [id number] : Carmen Cao is a 78 y.o. female admitted to [facility] for rehab.   HPI  Pt with h/o CHF, depression/anxiety, HTN and age related cognitive decline admitted with gait instability and muscle jerking due to welbutrin side effect.   Pt seen while sitting up in wheelchair.  Nursing reports that daughter is concerned about increased lower leg edema.  Pt is on lasix 40mg daily.   Pt does have a very small amount of swelling in the left foot.   Pt was started on norvasc this week for her blood pressures and could be a side effect of this medication.  Daughter has asked that lasix be increased.  No shortness of breath or cough is noted.   She is alert, pleasant and interactive.  She offers no new complaints or concerns.  No abdominal pain.  No n/v/d/c.  NO lower leg edema.   Pt lives with her spouse.  Code status is full code.   Review of Systems   Constitutional:  Negative for appetite change, chills and fever.   Respiratory:  Negative for cough, shortness of breath and wheezing.    Cardiovascular:  Positive for leg swelling. Negative for chest pain.   Gastrointestinal:  Negative for abdominal pain, constipation, diarrhea, nausea and vomiting.   Genitourinary:  Negative for dysuria, frequency and hematuria.   Neurological:  Negative for dizziness, tremors, weakness and headaches.   Psychiatric/Behavioral:  Negative for confusion. The patient is not nervous/anxious.    All other systems reviewed and are negative.      Objective   /90   Pulse 81   Temp 36.5 °C (97.7 °F)   Resp 18   Wt 76.9 kg (169 lb 9.6 oz)   SpO2 95%   BMI 33.12 kg/m²    Physical Exam  Constitutional:       General: She is not in acute distress.  Eyes:      Conjunctiva/sclera: Conjunctivae normal.      Pupils: Pupils are equal, round, and reactive to light.   Cardiovascular:      Rate and Rhythm: Normal rate and regular rhythm.      Heart sounds: No murmur heard.  Pulmonary:      Effort: Pulmonary effort is  "normal.      Breath sounds: No wheezing, rhonchi or rales.   Abdominal:      General: Abdomen is flat. Bowel sounds are normal. There is no distension.      Palpations: Abdomen is soft. There is no mass.      Tenderness: There is no abdominal tenderness.   Musculoskeletal:         General: No swelling. Normal range of motion.      Left lower leg: Edema (1+ edema in the left foot.) present.   Skin:     General: Skin is warm and dry.      Findings: No rash.   Neurological:      General: No focal deficit present.      Mental Status: She is alert and oriented to person, place, and time. Mental status is at baseline.       No lab exists for component: \"CBC BMP\"  Assessment/Plan   Age-related cognitive decline R41.81      Anxiety F41.9       Continue paxil.   Taken off wellbutrin due to side effects.            Hypothyroidism E03.9       Continue synthroid           HTN (hypertension) I10       Blood pressures elevated - mild lower leg edema  - possible side effect of norvasc.  Stop norvasc - start losartan for HTN.   Monitor blood pressures and adjust meds as needed           Gait disorder - Primary R26.9       Continue PT/OT           Hypokalemia E87.6       Potassium 3.7 on today's labs.  Magnesium in normal range.  Continue  potassium chloride 20 mEQ daily.   Monitor weekly Monday.          Lower leg edema:  mild edema noted.   Add daily weights.   Increase lasix to 60mg daily.  Monitor labs - bmp on Monday.  Have also changed the norvasc to losartan.  Gaurav hose to lower extremities daily.           Time spent: 30 min in review of chart, labs and orders, consultation with pt and documentation.   "

## 2024-04-29 ENCOUNTER — NURSING HOME VISIT (OUTPATIENT)
Dept: POST ACUTE CARE | Facility: EXTERNAL LOCATION | Age: 79
End: 2024-04-29
Payer: COMMERCIAL

## 2024-04-29 VITALS
DIASTOLIC BLOOD PRESSURE: 79 MMHG | WEIGHT: 171.2 LBS | BODY MASS INDEX: 33.44 KG/M2 | RESPIRATION RATE: 20 BRPM | SYSTOLIC BLOOD PRESSURE: 143 MMHG | HEART RATE: 81 BPM | TEMPERATURE: 97.4 F | OXYGEN SATURATION: 92 %

## 2024-04-29 DIAGNOSIS — E87.6 HYPOKALEMIA: ICD-10-CM

## 2024-04-29 DIAGNOSIS — E03.8 HYPOTHYROIDISM DUE TO HASHIMOTO'S THYROIDITIS: ICD-10-CM

## 2024-04-29 DIAGNOSIS — R26.9 GAIT DISORDER: Primary | ICD-10-CM

## 2024-04-29 DIAGNOSIS — I10 PRIMARY HYPERTENSION: ICD-10-CM

## 2024-04-29 DIAGNOSIS — E03.9 HYPOTHYROIDISM, UNSPECIFIED TYPE: ICD-10-CM

## 2024-04-29 DIAGNOSIS — E06.3 HYPOTHYROIDISM DUE TO HASHIMOTO'S THYROIDITIS: ICD-10-CM

## 2024-04-29 DIAGNOSIS — R41.81 AGE-RELATED COGNITIVE DECLINE: ICD-10-CM

## 2024-04-29 DIAGNOSIS — F41.9 ANXIETY: ICD-10-CM

## 2024-04-29 PROCEDURE — 99309 SBSQ NF CARE MODERATE MDM 30: CPT | Performed by: PHYSICIAN ASSISTANT

## 2024-04-29 ASSESSMENT — ENCOUNTER SYMPTOMS
HEADACHES: 0
APPETITE CHANGE: 0
DIZZINESS: 0
CONFUSION: 0
WEAKNESS: 0
ABDOMINAL PAIN: 0
TREMORS: 0
CHILLS: 0
VOMITING: 0
SHORTNESS OF BREATH: 0
COUGH: 0
NERVOUS/ANXIOUS: 0
DIARRHEA: 0
DYSURIA: 0
WHEEZING: 0
HEMATURIA: 0
FEVER: 0
NAUSEA: 0
CONSTIPATION: 0
FREQUENCY: 0

## 2024-04-29 NOTE — LETTER
Patient: Carmen Cao  : 1945    Encounter Date: 2024      Subjective  [id number] : Carmen Cao is a 78 y.o. female admitted to [facility] for rehab.   HPI  Pt with h/o CHF, depression/anxiety, HTN and age related cognitive decline admitted with gait instability and muscle jerking due to welbutrin side effect.   Pt seen while sitting up in wheelchair.   Increased lasix to 60mg over the weekend.  Pt tolerated the increase well.  Labs reviewed today and are stable.   She has no lower leg edema on exam.  She denies any shortness of breath or cough.  Lungs are clear.   Weight is stable.    Now on losartan for blood pressures  143/79 today.   She is alert, pleasant and interactive.  She offers no new complaints or concerns.  No abdominal pain.  No n/v/d/c.  NO lower leg edema.  She has been eating and drinking well per nursing.  Pt lives with her spouse.  Code status is full code.   Review of Systems   Constitutional:  Negative for appetite change, chills and fever.   Respiratory:  Negative for cough, shortness of breath and wheezing.    Cardiovascular:  Negative for chest pain and leg swelling.   Gastrointestinal:  Negative for abdominal pain, constipation, diarrhea, nausea and vomiting.   Genitourinary:  Negative for dysuria, frequency and hematuria.   Neurological:  Negative for dizziness, tremors, weakness and headaches.   Psychiatric/Behavioral:  Negative for confusion. The patient is not nervous/anxious.    All other systems reviewed and are negative.      Objective  /79   Pulse 81   Temp 36.3 °C (97.4 °F)   Resp 20   Wt 77.7 kg (171 lb 3.2 oz)   SpO2 92%   BMI 33.44 kg/m²    Physical Exam  Constitutional:       General: She is not in acute distress.  Eyes:      Conjunctiva/sclera: Conjunctivae normal.      Pupils: Pupils are equal, round, and reactive to light.   Cardiovascular:      Rate and Rhythm: Normal rate and regular rhythm.      Heart sounds: No murmur heard.  Pulmonary:  "     Effort: Pulmonary effort is normal.      Breath sounds: No wheezing, rhonchi or rales.   Abdominal:      General: Abdomen is flat. Bowel sounds are normal. There is no distension.      Palpations: Abdomen is soft. There is no mass.      Tenderness: There is no abdominal tenderness.   Musculoskeletal:         General: No swelling. Normal range of motion.   Skin:     General: Skin is warm and dry.      Findings: No rash.   Neurological:      General: No focal deficit present.      Mental Status: She is alert and oriented to person, place, and time. Mental status is at baseline.       No lab exists for component: \"CBC BMP\"  Assessment/Plan  Age-related cognitive decline R41.81      Anxiety F41.9       Continue paxil.   Taken off wellbutrin due to side effects.            Hypothyroidism E03.9       Continue synthroid           HTN (hypertension) I10       Blood pressures elevated - continue losartan for HTN.   Monitor blood pressures and adjust meds as needed           Gait disorder - Primary R26.9       Continue PT/OT           Hypokalemia E87.6       Potassium in normal range.  Continue  potassium chloride 20 mEQ daily.   Monitor weekly labs         Lower leg edema:  improved.  Continue daily weights.   On lasix to 60mg daily.  Monitor labs.  Gaurav hose to lower extremities daily.           Time spent: 30 min in review of chart, labs and orders, consultation with pt and documentation.       Electronically Signed By: Catherine Gresham PA-C   4/29/24  5:24 PM  "

## 2024-04-29 NOTE — PROGRESS NOTES
Subjective   [id number] : Carmen Cao is a 78 y.o. female admitted to [facility] for rehab.   HPI  Pt with h/o CHF, depression/anxiety, HTN and age related cognitive decline admitted with gait instability and muscle jerking due to welbutrin side effect.   Pt seen while sitting up in wheelchair.   Increased lasix to 60mg over the weekend.  Pt tolerated the increase well.  Labs reviewed today and are stable.   She has no lower leg edema on exam.  She denies any shortness of breath or cough.  Lungs are clear.   Weight is stable.    Now on losartan for blood pressures  143/79 today.   She is alert, pleasant and interactive.  She offers no new complaints or concerns.  No abdominal pain.  No n/v/d/c.  NO lower leg edema.  She has been eating and drinking well per nursing.  Pt lives with her spouse.  Code status is full code.   Review of Systems   Constitutional:  Negative for appetite change, chills and fever.   Respiratory:  Negative for cough, shortness of breath and wheezing.    Cardiovascular:  Negative for chest pain and leg swelling.   Gastrointestinal:  Negative for abdominal pain, constipation, diarrhea, nausea and vomiting.   Genitourinary:  Negative for dysuria, frequency and hematuria.   Neurological:  Negative for dizziness, tremors, weakness and headaches.   Psychiatric/Behavioral:  Negative for confusion. The patient is not nervous/anxious.    All other systems reviewed and are negative.      Objective   /79   Pulse 81   Temp 36.3 °C (97.4 °F)   Resp 20   Wt 77.7 kg (171 lb 3.2 oz)   SpO2 92%   BMI 33.44 kg/m²    Physical Exam  Constitutional:       General: She is not in acute distress.  Eyes:      Conjunctiva/sclera: Conjunctivae normal.      Pupils: Pupils are equal, round, and reactive to light.   Cardiovascular:      Rate and Rhythm: Normal rate and regular rhythm.      Heart sounds: No murmur heard.  Pulmonary:      Effort: Pulmonary effort is normal.      Breath sounds: No  "wheezing, rhonchi or rales.   Abdominal:      General: Abdomen is flat. Bowel sounds are normal. There is no distension.      Palpations: Abdomen is soft. There is no mass.      Tenderness: There is no abdominal tenderness.   Musculoskeletal:         General: No swelling. Normal range of motion.   Skin:     General: Skin is warm and dry.      Findings: No rash.   Neurological:      General: No focal deficit present.      Mental Status: She is alert and oriented to person, place, and time. Mental status is at baseline.       No lab exists for component: \"CBC BMP\"  Assessment/Plan   Age-related cognitive decline R41.81      Anxiety F41.9       Continue paxil.   Taken off wellbutrin due to side effects.            Hypothyroidism E03.9       Continue synthroid           HTN (hypertension) I10       Blood pressures elevated - continue losartan for HTN.   Monitor blood pressures and adjust meds as needed           Gait disorder - Primary R26.9       Continue PT/OT           Hypokalemia E87.6       Potassium in normal range.  Continue  potassium chloride 20 mEQ daily.   Monitor weekly labs         Lower leg edema:  improved.  Continue daily weights.   On lasix to 60mg daily.  Monitor labs.  Gaurav hose to lower extremities daily.           Time spent: 30 min in review of chart, labs and orders, consultation with pt and documentation.   "

## 2024-05-03 ENCOUNTER — NURSING HOME VISIT (OUTPATIENT)
Dept: POST ACUTE CARE | Facility: EXTERNAL LOCATION | Age: 79
End: 2024-05-03
Payer: COMMERCIAL

## 2024-05-03 VITALS
HEART RATE: 85 BPM | OXYGEN SATURATION: 94 % | RESPIRATION RATE: 20 BRPM | BODY MASS INDEX: 33.67 KG/M2 | TEMPERATURE: 97.2 F | WEIGHT: 172.4 LBS | DIASTOLIC BLOOD PRESSURE: 93 MMHG | SYSTOLIC BLOOD PRESSURE: 159 MMHG

## 2024-05-03 DIAGNOSIS — E87.6 HYPOKALEMIA: ICD-10-CM

## 2024-05-03 DIAGNOSIS — F41.9 ANXIETY: ICD-10-CM

## 2024-05-03 DIAGNOSIS — R26.9 GAIT DISORDER: Primary | ICD-10-CM

## 2024-05-03 DIAGNOSIS — E03.9 HYPOTHYROIDISM, UNSPECIFIED TYPE: ICD-10-CM

## 2024-05-03 DIAGNOSIS — I10 PRIMARY HYPERTENSION: ICD-10-CM

## 2024-05-03 DIAGNOSIS — R41.81 AGE-RELATED COGNITIVE DECLINE: ICD-10-CM

## 2024-05-03 PROCEDURE — 99309 SBSQ NF CARE MODERATE MDM 30: CPT | Performed by: PHYSICIAN ASSISTANT

## 2024-05-03 ASSESSMENT — ENCOUNTER SYMPTOMS
WEAKNESS: 0
SHORTNESS OF BREATH: 0
CONFUSION: 0
DIZZINESS: 0
CONSTIPATION: 0
CHILLS: 0
COUGH: 0
VOMITING: 0
NAUSEA: 0
FEVER: 0
HEMATURIA: 0
WHEEZING: 0
HEADACHES: 0
APPETITE CHANGE: 0
NERVOUS/ANXIOUS: 0
FREQUENCY: 0
TREMORS: 0
DIARRHEA: 0
ABDOMINAL PAIN: 0
DYSURIA: 0

## 2024-05-03 NOTE — LETTER
Patient: Carmen Cao  : 1945    Encounter Date: 2024      Subjective  [id number] : Carmen Cao is a 78 y.o. female admitted to [facility] for rehab.   HPI  Pt with h/o CHF, depression/anxiety, HTN and age related cognitive decline admitted with gait instability and muscle jerking due to welbutrin side effect.   Pt seen while sitting up in wheelchair.   Pt offers no new complaints.  She continues losartan and lasix.   BMP done today and is stable.   She is tolerating the diuretic well.  She has no lower leg edema noted on exam today.   Weight is stable.   She denies any shortness of breath or cough.   She is alert, pleasant and interactive.  She offers no new complaints or concerns.  No abdominal pain.  No n/v/d/c.  She has been eating and drinking well per nursing.  Pt lives with her spouse.  Code status is full code.   Review of Systems   Constitutional:  Negative for appetite change, chills and fever.   Respiratory:  Negative for cough, shortness of breath and wheezing.    Cardiovascular:  Negative for chest pain and leg swelling.   Gastrointestinal:  Negative for abdominal pain, constipation, diarrhea, nausea and vomiting.   Genitourinary:  Negative for dysuria, frequency and hematuria.   Neurological:  Negative for dizziness, tremors, weakness and headaches.   Psychiatric/Behavioral:  Negative for confusion. The patient is not nervous/anxious.    All other systems reviewed and are negative.      Objective  BP (!) 159/93   Pulse 85   Temp 36.2 °C (97.2 °F)   Resp 20   Wt 78.2 kg (172 lb 6.4 oz)   SpO2 94%   BMI 33.67 kg/m²    Physical Exam  Constitutional:       General: She is not in acute distress.  Eyes:      Conjunctiva/sclera: Conjunctivae normal.      Pupils: Pupils are equal, round, and reactive to light.   Cardiovascular:      Rate and Rhythm: Normal rate and regular rhythm.      Heart sounds: No murmur heard.  Pulmonary:      Effort: Pulmonary effort is normal.      Breath  "sounds: No wheezing, rhonchi or rales.   Abdominal:      General: Abdomen is flat. Bowel sounds are normal. There is no distension.      Palpations: Abdomen is soft. There is no mass.      Tenderness: There is no abdominal tenderness.   Musculoskeletal:         General: No swelling. Normal range of motion.   Skin:     General: Skin is warm and dry.      Findings: No rash.   Neurological:      General: No focal deficit present.      Mental Status: She is alert and oriented to person, place, and time. Mental status is at baseline.       No lab exists for component: \"CBC BMP\"  Assessment/Plan  Age-related cognitive decline R41.81      Anxiety F41.9       Continue paxil.   Taken off wellbutrin due to side effects.            Hypothyroidism E03.9       Continue synthroid           HTN (hypertension) I10       Blood pressures elevated - continue losartan for HTN.   Monitor blood pressures and adjust meds as needed           Gait disorder - Primary R26.9       Continue PT/OT.    Discharge planning.            Hypokalemia E87.6       Potassium in normal range.  Continue  potassium chloride 20 mEQ daily.   Monitor weekly labs         Lower leg edema:  improved.  Continue daily weights.   On lasix to 60mg daily.  Monitor labs.  Gaurav hose to lower extremities daily.           Time spent: 30 min in review of chart, labs and orders, consultation with pt and documentation.       Electronically Signed By: Catherine Gresham PA-C   5/3/24 11:55 AM  "

## 2024-05-03 NOTE — PROGRESS NOTES
Subjective   [id number] : Carmen Cao is a 78 y.o. female admitted to [facility] for rehab.   HPI  Pt with h/o CHF, depression/anxiety, HTN and age related cognitive decline admitted with gait instability and muscle jerking due to welbutrin side effect.   Pt seen while sitting up in wheelchair.   Pt offers no new complaints.  She continues losartan and lasix.   BMP done today and is stable.   She is tolerating the diuretic well.  She has no lower leg edema noted on exam today.   Weight is stable.   She denies any shortness of breath or cough.   She is alert, pleasant and interactive.  She offers no new complaints or concerns.  No abdominal pain.  No n/v/d/c.  She has been eating and drinking well per nursing.  Pt lives with her spouse.  Code status is full code.   Review of Systems   Constitutional:  Negative for appetite change, chills and fever.   Respiratory:  Negative for cough, shortness of breath and wheezing.    Cardiovascular:  Negative for chest pain and leg swelling.   Gastrointestinal:  Negative for abdominal pain, constipation, diarrhea, nausea and vomiting.   Genitourinary:  Negative for dysuria, frequency and hematuria.   Neurological:  Negative for dizziness, tremors, weakness and headaches.   Psychiatric/Behavioral:  Negative for confusion. The patient is not nervous/anxious.    All other systems reviewed and are negative.      Objective   BP (!) 159/93   Pulse 85   Temp 36.2 °C (97.2 °F)   Resp 20   Wt 78.2 kg (172 lb 6.4 oz)   SpO2 94%   BMI 33.67 kg/m²    Physical Exam  Constitutional:       General: She is not in acute distress.  Eyes:      Conjunctiva/sclera: Conjunctivae normal.      Pupils: Pupils are equal, round, and reactive to light.   Cardiovascular:      Rate and Rhythm: Normal rate and regular rhythm.      Heart sounds: No murmur heard.  Pulmonary:      Effort: Pulmonary effort is normal.      Breath sounds: No wheezing, rhonchi or rales.   Abdominal:      General:  "Abdomen is flat. Bowel sounds are normal. There is no distension.      Palpations: Abdomen is soft. There is no mass.      Tenderness: There is no abdominal tenderness.   Musculoskeletal:         General: No swelling. Normal range of motion.   Skin:     General: Skin is warm and dry.      Findings: No rash.   Neurological:      General: No focal deficit present.      Mental Status: She is alert and oriented to person, place, and time. Mental status is at baseline.       No lab exists for component: \"CBC BMP\"  Assessment/Plan   Age-related cognitive decline R41.81      Anxiety F41.9       Continue paxil.   Taken off wellbutrin due to side effects.            Hypothyroidism E03.9       Continue synthroid           HTN (hypertension) I10       Blood pressures elevated - continue losartan for HTN.   Monitor blood pressures and adjust meds as needed           Gait disorder - Primary R26.9       Continue PT/OT.    Discharge planning.            Hypokalemia E87.6       Potassium in normal range.  Continue  potassium chloride 20 mEQ daily.   Monitor weekly labs         Lower leg edema:  improved.  Continue daily weights.   On lasix to 60mg daily.  Monitor labs.  Gaurav hose to lower extremities daily.           Time spent: 30 min in review of chart, labs and orders, consultation with pt and documentation.   "

## 2024-05-06 ENCOUNTER — NURSING HOME VISIT (OUTPATIENT)
Dept: POST ACUTE CARE | Facility: EXTERNAL LOCATION | Age: 79
End: 2024-05-06
Payer: COMMERCIAL

## 2024-05-06 VITALS
BODY MASS INDEX: 33.36 KG/M2 | WEIGHT: 170.8 LBS | SYSTOLIC BLOOD PRESSURE: 157 MMHG | HEART RATE: 72 BPM | OXYGEN SATURATION: 94 % | TEMPERATURE: 97.2 F | RESPIRATION RATE: 16 BRPM | DIASTOLIC BLOOD PRESSURE: 91 MMHG

## 2024-05-06 DIAGNOSIS — E87.6 HYPOKALEMIA: ICD-10-CM

## 2024-05-06 DIAGNOSIS — R41.81 AGE-RELATED COGNITIVE DECLINE: ICD-10-CM

## 2024-05-06 DIAGNOSIS — F41.9 ANXIETY: ICD-10-CM

## 2024-05-06 DIAGNOSIS — E03.9 HYPOTHYROIDISM, UNSPECIFIED TYPE: ICD-10-CM

## 2024-05-06 DIAGNOSIS — I10 PRIMARY HYPERTENSION: ICD-10-CM

## 2024-05-06 DIAGNOSIS — R26.9 GAIT DISORDER: Primary | ICD-10-CM

## 2024-05-06 PROCEDURE — 99309 SBSQ NF CARE MODERATE MDM 30: CPT | Performed by: PHYSICIAN ASSISTANT

## 2024-05-06 ASSESSMENT — ENCOUNTER SYMPTOMS
ABDOMINAL PAIN: 0
CONSTIPATION: 0
DIARRHEA: 0
NAUSEA: 0
WEAKNESS: 0
SHORTNESS OF BREATH: 0
TREMORS: 0
DIZZINESS: 0
HEMATURIA: 0
DYSURIA: 0
COUGH: 0
NERVOUS/ANXIOUS: 0
FEVER: 0
CHILLS: 0
APPETITE CHANGE: 0
CONFUSION: 0
VOMITING: 0
WHEEZING: 0
FREQUENCY: 0
HEADACHES: 0

## 2024-05-06 NOTE — LETTER
Patient: Carmen Cao  : 1945    Encounter Date: 2024      Subjective  [id number] : Carmen Cao is a 78 y.o. female admitted to [facility] for rehab.   HPI  Pt with h/o CHF, depression/anxiety, HTN and age related cognitive decline admitted with gait instability and muscle jerking due to welbutrin side effect.   Pt seen while sitting up in wheelchair.   Pt offers no new complaints.   Pt Blood pressure continue to be a little high.  She continues lasix and has no lower leg edema noted.  Weight is stable.  She had routine labs today which were reviewed and are stable.  She denies any shortness of breath or cough.   She is alert, pleasant and interactive.   No abdominal pain.  No n/v/d/c.  She has been eating and drinking well per nursing.  Pt lives with her spouse.  Code status is full code.   Review of Systems   Constitutional:  Negative for appetite change, chills and fever.   Respiratory:  Negative for cough, shortness of breath and wheezing.    Cardiovascular:  Negative for chest pain and leg swelling.   Gastrointestinal:  Negative for abdominal pain, constipation, diarrhea, nausea and vomiting.   Genitourinary:  Negative for dysuria, frequency and hematuria.   Neurological:  Negative for dizziness, tremors, weakness and headaches.   Psychiatric/Behavioral:  Negative for confusion. The patient is not nervous/anxious.    All other systems reviewed and are negative.      Objective  BP (!) 157/91   Pulse 72   Temp 36.2 °C (97.2 °F)   Resp 16   Wt 77.5 kg (170 lb 12.8 oz)   SpO2 94%   BMI 33.36 kg/m²    Physical Exam  Constitutional:       General: She is not in acute distress.  Eyes:      Conjunctiva/sclera: Conjunctivae normal.      Pupils: Pupils are equal, round, and reactive to light.   Cardiovascular:      Rate and Rhythm: Normal rate and regular rhythm.      Heart sounds: No murmur heard.  Pulmonary:      Effort: Pulmonary effort is normal.      Breath sounds: No wheezing, rhonchi  "or rales.   Abdominal:      General: Abdomen is flat. Bowel sounds are normal. There is no distension.      Palpations: Abdomen is soft. There is no mass.      Tenderness: There is no abdominal tenderness.   Musculoskeletal:         General: No swelling. Normal range of motion.   Skin:     General: Skin is warm and dry.      Findings: No rash.   Neurological:      General: No focal deficit present.      Mental Status: She is alert and oriented to person, place, and time. Mental status is at baseline.       No lab exists for component: \"CBC BMP\"  Assessment/Plan  Age-related cognitive decline R41.81      Anxiety F41.9       Continue paxil.   Taken off wellbutrin due to side effects.            Hypothyroidism E03.9       Continue synthroid           HTN (hypertension) I10       Blood pressures elevated - increase losartan to 50mg daily for HTN.   Monitor blood pressures and adjust meds as needed           Gait disorder - Primary R26.9       Continue PT/OT.    Discharge planning.            Hypokalemia E87.6       Potassium in normal range.  Continue  potassium chloride 20 mEQ daily.   Monitor weekly labs         Lower leg edema:  improved.  Continue daily weights.   On lasix to 60mg daily.  Monitor labs.  Gaurav hose to lower extremities daily.           Time spent: 30 min in review of chart, labs and orders, consultation with pt and documentation.       Electronically Signed By: Catherine Gresham PA-C   5/6/24 11:55 AM  "

## 2024-05-06 NOTE — PROGRESS NOTES
Subjective   [id number] : Carmen Cao is a 78 y.o. female admitted to [facility] for rehab.   HPI  Pt with h/o CHF, depression/anxiety, HTN and age related cognitive decline admitted with gait instability and muscle jerking due to welbutrin side effect.   Pt seen while sitting up in wheelchair.   Pt offers no new complaints.   Pt Blood pressure continue to be a little high.  She continues lasix and has no lower leg edema noted.  Weight is stable.  She had routine labs today which were reviewed and are stable.  She denies any shortness of breath or cough.   She is alert, pleasant and interactive.   No abdominal pain.  No n/v/d/c.  She has been eating and drinking well per nursing.  Pt lives with her spouse.  Code status is full code.   Review of Systems   Constitutional:  Negative for appetite change, chills and fever.   Respiratory:  Negative for cough, shortness of breath and wheezing.    Cardiovascular:  Negative for chest pain and leg swelling.   Gastrointestinal:  Negative for abdominal pain, constipation, diarrhea, nausea and vomiting.   Genitourinary:  Negative for dysuria, frequency and hematuria.   Neurological:  Negative for dizziness, tremors, weakness and headaches.   Psychiatric/Behavioral:  Negative for confusion. The patient is not nervous/anxious.    All other systems reviewed and are negative.      Objective   BP (!) 157/91   Pulse 72   Temp 36.2 °C (97.2 °F)   Resp 16   Wt 77.5 kg (170 lb 12.8 oz)   SpO2 94%   BMI 33.36 kg/m²    Physical Exam  Constitutional:       General: She is not in acute distress.  Eyes:      Conjunctiva/sclera: Conjunctivae normal.      Pupils: Pupils are equal, round, and reactive to light.   Cardiovascular:      Rate and Rhythm: Normal rate and regular rhythm.      Heart sounds: No murmur heard.  Pulmonary:      Effort: Pulmonary effort is normal.      Breath sounds: No wheezing, rhonchi or rales.   Abdominal:      General: Abdomen is flat. Bowel sounds are  "normal. There is no distension.      Palpations: Abdomen is soft. There is no mass.      Tenderness: There is no abdominal tenderness.   Musculoskeletal:         General: No swelling. Normal range of motion.   Skin:     General: Skin is warm and dry.      Findings: No rash.   Neurological:      General: No focal deficit present.      Mental Status: She is alert and oriented to person, place, and time. Mental status is at baseline.       No lab exists for component: \"CBC BMP\"  Assessment/Plan   Age-related cognitive decline R41.81      Anxiety F41.9       Continue paxil.   Taken off wellbutrin due to side effects.            Hypothyroidism E03.9       Continue synthroid           HTN (hypertension) I10       Blood pressures elevated - increase losartan to 50mg daily for HTN.   Monitor blood pressures and adjust meds as needed           Gait disorder - Primary R26.9       Continue PT/OT.    Discharge planning.            Hypokalemia E87.6       Potassium in normal range.  Continue  potassium chloride 20 mEQ daily.   Monitor weekly labs         Lower leg edema:  improved.  Continue daily weights.   On lasix to 60mg daily.  Monitor labs.  Gaurav hose to lower extremities daily.           Time spent: 30 min in review of chart, labs and orders, consultation with pt and documentation.   "

## 2024-05-10 ENCOUNTER — NURSING HOME VISIT (OUTPATIENT)
Dept: POST ACUTE CARE | Facility: EXTERNAL LOCATION | Age: 79
End: 2024-05-10
Payer: COMMERCIAL

## 2024-05-10 VITALS
SYSTOLIC BLOOD PRESSURE: 151 MMHG | BODY MASS INDEX: 33.44 KG/M2 | WEIGHT: 171.2 LBS | OXYGEN SATURATION: 95 % | TEMPERATURE: 97.3 F | DIASTOLIC BLOOD PRESSURE: 80 MMHG | RESPIRATION RATE: 20 BRPM | HEART RATE: 79 BPM

## 2024-05-10 DIAGNOSIS — E06.3 HYPOTHYROIDISM DUE TO HASHIMOTO'S THYROIDITIS: ICD-10-CM

## 2024-05-10 DIAGNOSIS — F41.9 ANXIETY: ICD-10-CM

## 2024-05-10 DIAGNOSIS — F32.A DEPRESSION, UNSPECIFIED DEPRESSION TYPE: ICD-10-CM

## 2024-05-10 DIAGNOSIS — R26.9 GAIT DISORDER: Primary | ICD-10-CM

## 2024-05-10 DIAGNOSIS — R41.81 AGE-RELATED COGNITIVE DECLINE: ICD-10-CM

## 2024-05-10 DIAGNOSIS — E03.8 HYPOTHYROIDISM DUE TO HASHIMOTO'S THYROIDITIS: ICD-10-CM

## 2024-05-10 DIAGNOSIS — E03.9 HYPOTHYROIDISM, UNSPECIFIED TYPE: ICD-10-CM

## 2024-05-10 DIAGNOSIS — E87.6 HYPOKALEMIA: ICD-10-CM

## 2024-05-10 DIAGNOSIS — I10 PRIMARY HYPERTENSION: ICD-10-CM

## 2024-05-10 PROCEDURE — 99309 SBSQ NF CARE MODERATE MDM 30: CPT | Performed by: PHYSICIAN ASSISTANT

## 2024-05-10 ASSESSMENT — ENCOUNTER SYMPTOMS
CONFUSION: 0
NERVOUS/ANXIOUS: 0
WHEEZING: 0
FEVER: 0
APPETITE CHANGE: 0
SHORTNESS OF BREATH: 0
COUGH: 0
DYSURIA: 0
VOMITING: 0
WEAKNESS: 0
ABDOMINAL PAIN: 0
HEADACHES: 0
NAUSEA: 0
TREMORS: 0
CONSTIPATION: 0
FREQUENCY: 0
DIZZINESS: 0
CHILLS: 0
DIARRHEA: 0
HEMATURIA: 0

## 2024-05-10 NOTE — LETTER
Patient: Carmen Cao  : 1945    Encounter Date: 05/10/2024      Subjective  [id number] : Carmen Cao is a 78 y.o. female admitted to [facility] for rehab.   HPI  Pt with h/o CHF, depression/anxiety, HTN and age related cognitive decline admitted with gait instability and muscle jerking due to welbutrin side effect.   Pt seen while sitting up in wheelchair.  She is alert, pleasant and interactive.  She offers no new complaints.    She denies any pain.  No new issues reported by nursing.  Pt feels that she is making good progress with therapy.   She continues on lasix and has no lower leg edema noted.  Weight and labs are stable.  She denies any shortness of breath or cough.   No abdominal pain.  No n/v/d/c.  She has been eating and drinking well per nursing.  Pt lives with her spouse.  Code status is full code.   Review of Systems   Constitutional:  Negative for appetite change, chills and fever.   Respiratory:  Negative for cough, shortness of breath and wheezing.    Cardiovascular:  Negative for chest pain and leg swelling.   Gastrointestinal:  Negative for abdominal pain, constipation, diarrhea, nausea and vomiting.   Genitourinary:  Negative for dysuria, frequency and hematuria.   Neurological:  Negative for dizziness, tremors, weakness and headaches.   Psychiatric/Behavioral:  Negative for confusion. The patient is not nervous/anxious.    All other systems reviewed and are negative.      Objective  /80   Pulse 79   Temp 36.3 °C (97.3 °F)   Resp 20   Wt 77.7 kg (171 lb 3.2 oz)   SpO2 95%   BMI 33.44 kg/m²    Physical Exam  Constitutional:       General: She is not in acute distress.  Eyes:      Conjunctiva/sclera: Conjunctivae normal.      Pupils: Pupils are equal, round, and reactive to light.   Cardiovascular:      Rate and Rhythm: Normal rate and regular rhythm.      Heart sounds: No murmur heard.  Pulmonary:      Effort: Pulmonary effort is normal.      Breath sounds: No  REQUESTING PHYSICIAN:  Destiny Broderick MD      REASON FOR CONSULTATION/ CHIEF COMPLAINT  PE    HISTORY OF PRESENT ILLNESS:  Aly Mcbride is a very pleasant 19 year old female with past medical history significant for intermittent estrogen hormone use and obstructive sleep apnea who was admitted to [facility] for pneumonia with sepsis about twelve days ago. CT Angiogram of the Chest on admission did not show PE though opacification was suboptimal.  She was intubated in the ICU for a time, but it was noted post-intubation on August 1 that the patient had been complaining of right sided chest pain since after extubation.  She was also tachycardic.  Repeat CT Angiogram reveal multiple PEs.  Our advice on how best to manage her thrombotic episode is requested.  On interview the patient reports improving pleuritic right sided chest pain PS = 8/10 with associated mild dyspnea.  The patient also report that while she has had foot pain with ambulation for several months, she has noticed increased pain in the right leg since extubation.  She reports having used hormones for only two days prior to getting sick with only remote hormone use before that.  She was sick for three days prior to admission    REVIEW OF SYSTEMS:  A review of systems was conducted and was negative except as discussed in the history of present illness and as enumerated below:      Past Medical History:   Diagnosis Date   • Allergy, unspecified not elsewhere classified    • Attention deficit disorder with hyperactivity    • Difficult intubation 07/22/2017   • Essential (primary) hypertension    • Suicide attempt by drug ingestion (CMS/[facility]) 6/21/11    liquor and Dayquil      Past Surgical History:   Procedure Laterality Date   • APPENDECTOMY       Current Facility-Administered Medications   Medication Dose Route Frequency Provider Last Rate Last Dose   • famotidine (PEPCID) tablet 20 mg  20 mg Oral 2 times per day CLIFFORD Muñoz        • WARFARIN - PHARMACIST MONITORED   Does not apply See Admin Instructions Destiny Broderikc MD       • warfarin (COUMADIN) tablet 7.5 mg  7.5 mg Oral Once Destiny Broderick MD       • sodium chloride 0.9% infusion   Intravenous Continuous Destiny Broderick  mL/hr at 08/02/17 1203     • heparin (porcine) 12736 units/500 mL dextrose 5% standard infusion   Intravenous Continuous CLIFFORD Muñoz 64.9 mL/hr at 08/02/17 1610 17 Units/kg/hr at 08/02/17 1610   • heparin (porcine) injection 10,000 Units  10,000 Units Intravenous PRN CLIFFORD Muñoz   10,000 Units at 08/02/17 0803   • heparin (porcine) injection 5,000 Units  5,000 Units Intravenous PRN CLIFFORD Muñoz   5,000 Units at 08/01/17 2258   • metoPROLOL (LOPRESSOR) tablet 75 mg  75 mg Oral 2 times per day Joe Quispe MD   75 mg at 08/02/17 0823   • labetalol (NORMODYNE) injection 20 mg  20 mg Intravenous Q6H PRN Joe Quispe MD       • acetaminophen-codeine (TYLENOL NO.3) 300-30 MG per tablet 1 tablet  1 tablet Oral Q4H PRN Tammy M Heppe, NP   1 tablet at 08/02/17 1202   • prochlorperazine (COMPAZINE) injection 10 mg  10 mg Intravenous Q6H PRN Tammy M Heppe, NP       • metoCLOPramide (REGLAN) injection 10 mg  10 mg Intravenous Q6H PRN Tammy M Heppe, NP   10 mg at 07/29/17 0938   • lidocaine (LIDODERM) 5 % patch 2 patch  2 patch Transdermal Daily Tammy M Heppe, NP   2 patch at 08/02/17 0845   • lidocaine patch removal  2 patch Transdermal Nightly Nabila Ashley MD   2 patch at 07/31/17 2056   • insulin regular (human) (HumuLIN R, NovoLIN R) sliding scale injection   Subcutaneous 4x Daily AC & HS CLIFFORD Steven   Stopped at 07/29/17 1949   • ibuprofen (MOTRIN) tablet 400 mg  400 mg Oral Q6H PRN Joe Quispe MD   400 mg at 07/29/17 1830   • morphine injection 1 mg  1 mg Intravenous Q4H PRN Kaushik Schreiber MD   1 mg at 07/31/17 0027   • acetaminophen (TYLENOL) tablet 650 mg  650 mg Oral Q4H PRN Alejandro Uribe MD    "wheezing, rhonchi or rales.   Abdominal:      General: Abdomen is flat. Bowel sounds are normal. There is no distension.      Palpations: Abdomen is soft. There is no mass.      Tenderness: There is no abdominal tenderness.   Musculoskeletal:         General: No swelling. Normal range of motion.   Skin:     General: Skin is warm and dry.      Findings: No rash.   Neurological:      General: No focal deficit present.      Mental Status: She is alert and oriented to person, place, and time. Mental status is at baseline.       No lab exists for component: \"CBC BMP\"  Assessment/Plan  Age-related cognitive decline R41.81      Anxiety F41.9       Continue paxil.   Taken off wellbutrin due to side effects.            Hypothyroidism E03.9       Continue synthroid           HTN (hypertension) I10       Blood pressures elevated - losartan to 50mg daily for HTN.   Monitor blood pressures and adjust meds as needed           Gait disorder - Primary R26.9       Continue PT/OT.    Discharge planning.            Hypokalemia E87.6       Potassium in normal range.  Continue  potassium chloride 20 mEQ daily.   Monitor weekly labs         Lower leg edema:  improved.  Continue daily weights.   On lasix to 60mg daily.  Monitor labs.  Gaurav hose to lower extremities daily.           Time spent: 30 min in review of chart, labs and orders, consultation with pt and documentation.       Electronically Signed By: Catherine Gresham PA-C   5/10/24 11:48 AM  " 650 mg at 07/29/17 0630   • amLODIPine (NORVASC) tablet 10 mg  10 mg Oral Daily Karina Coello, APNP   10 mg at 08/02/17 0823   • docusate sodium (COLACE) 50 MG/5ML liquid 100 mg  100 mg Oral Daily Wendie A Keup, APNP   100 mg at 08/02/17 0823   • guaiFENesin-DM) (ROBITUSSIN DM) 100-10 MG/5ML syrup 10 mL  10 mL Oral Q6H PRN Alejandro Uribe MD   10 mL at 07/29/17 1636   • lactobacillus acidophilus (BACID) tablet 1 tablet  1 tablet Oral BID Manish Robertson MD   1 tablet at 08/02/17 0823   • vitamin - therapeutic multivitamins w/minerals (CENTRUM SILVER,THERA-M) 1 tablet  1 tablet Oral Daily Ike Alexis MD   1 tablet at 08/02/17 0823   • ramelteon (ROZEREM) tablet 8 mg  8 mg Oral Nightly PRN Karina Coello, APNP   8 mg at 08/02/17 0057   • simethicone (MYLICON) tablet 80 mg  80 mg Oral 4x Daily PRN Karina Coello, APNP   80 mg at 07/28/17 2339   • potassium chloride (KLOR-CON) packet 40 mEq  40 mEq OG Tube Q4H PRN Wendie A Keup, APNP       • potassium chloride (KLOR-CON) packet 20 mEq  20 mEq OG Tube Q4H PRN Wendie A Keup, APNP   20 mEq at 07/31/17 0835   • sertraline (ZOLOFT) tablet 25 mg  25 mg Oral Daily Karina Coello, APNP   25 mg at 08/02/17 0823   • sodium chloride 0.9% infusion   Intravenous Continuous PRN Shawn Guan MD   Stopped at 07/28/17 0700   • LORazepam (ATIVAN) injection 1 mg  1 mg Intravenous Q6H PRN Shawn Fernandez MD   1 mg at 07/29/17 0141   • ondansetron (ZOFRAN) injection 4 mg  4 mg Intravenous Q12H PRN Quirino Snell MD   4 mg at 07/31/17 2243   • ampicillin-sulbactam (UNASYN) 3 g in sodium chloride 0.9 % 100 mL IVPB  3 g Intravenous 4 times per day Manish Robertson  mL/hr at 08/02/17 1202 3 g at 08/02/17 1202   • bisacodyl (DULCOLAX) suppository 10 mg  10 mg Rectal Daily PRN CLIFFORD Anderson       • nystatin (MYCOSTATIN) 566634 UNIT/ML suspension 500,000 Units  500,000 Units Swish & Swallow 4x Daily CLIFFORD Anderson   500,000 Units at 08/02/17 1509   •  alteplase (CATHFLO ACTIVASE) injection 2 mg  2 mg Intercatheter PRN Ike Alexis MD       • sodium chloride (PF) 0.9 % injection 10 mL  10 mL Injection 3 times per day Ike Alexis MD   10 mL at 08/02/17 1508   • sodium chloride (PF) 0.9 % injection 10 mL  10 mL Injection PRN Ike Alexis MD   10 mL at 08/02/17 1610   • potassium chloride (K-DUR,KLOR-CON) CR tablet 20 mEq  20 mEq Oral Q4H PRN Wendie A Keup, APNP   20 mEq at 07/29/17 0650   • potassium chloride 20 mEq/100mL IVPB premix  20 mEq Intravenous Q4H PRN Wendie A Keup, APNP   Stopped at 07/25/17 0729   • potassium chloride (K-DUR,KLOR-CON) CR tablet 40 mEq  40 mEq Oral Q4H PRN Wendie Chaudhryup, APNP       • potassium chloride 20 mEq/100mL IVPB premix  40 mEq Intravenous Q4H PRN Wendie Chaudhryup, APNP   Stopped at 07/28/17 1128   • sodium chloride (PF) 0.9 % injection 2 mL  2 mL Injection PRN Kaushik Schreiber MD       • sodium chloride 0.9 % flush bag 500 mL  500 mL Intravenous PRN Kaushik Schreiber MD   Stopped at 07/28/17 0700   • dextrose 50 % injection 25 g  25 g Intravenous PRN Karina BRITTON Blaha, APNP       • dextrose 5 % infusion   Intravenous Continuous PRN Karina BRITTON Blaha, APNP       • glucagon (GLUCAGEN) injection 1 mg  1 mg Intramuscular PRN Karina BRITTON Blaha, APNP       • dextrose (GLUTOSE) 40 % gel 15 g  15 g Oral PRN Karina BRITTON Blaha, APNP       • hydrALAZINE (APRESOLINE) injection 10-20 mg  10-20 mg Intravenous Q6H PRN Karina BRITTON Blatalia, APNP   20 mg at 07/31/17 1325   • CARBOXYMethylcellulose (REFRESH PLUS) 0.5 % ophthalmic solution 1 drop  1 drop Both Eyes TID Alejandro Uribe MD   1 drop at 08/02/17 1509   • albuterol inhaler 2 puff  2 puff Inhalation Q4H PRN Alejandro Uribe MD         ALLERGIES:   Allergen Reactions   • Hay Fever & [Chlorpheniramine-Ppa Cr] PRURITUS     Social History     Social History   • Marital status: Single     Spouse name: N/A   • Number of children: N/A   • Years of education: N/A     Social History Main Topics    • Smoking status: Current Every Day Smoker     Types: Cigarettes   • Smokeless tobacco: Never Used   • Alcohol use Yes      Comment: weekly   • Drug use:      Types: Marijuana      Comment: last used few weeks ago   • Sexual activity: Not Asked     Other Topics Concern   • None     Social History Narrative   • None     History reviewed. No pertinent family history.    PHYSICAL EXAMINATION:  VITAL SIGNS:  Blood pressure 139/79, pulse 100, temperature 98.1 °F (36.7 °C), temperature source Oral, resp. rate 18, height 5' 8\" (1.727 m), weight (!) 185.3 kg, SpO2 93 %.  GENERAL:  In no acute distress  HEENT: Atraumatic, external ears normal, nose normal, oropharynx moist. Neck- normal range of motion, no tenderness, supple.   RESPIRATORY:  No respiratory distress, absent breath sounds at right base, no rales, no wheezing   CARDIOVASCULAR:  Normal rate, normal rhythm, no murmurs, no gallops, no rubs   ABDOMEN:  Obese, Bowel sounds normal, Soft, No tenderness, No masses, No pulsatile masses. No rebound or organomegly.  PERIPHERAL VASCULAR:  Carotid no bruits.  Dorsalis pedis 2/2 left, 1/2 right.    CHEST:  No dilated superficial veins of the anterior chest.  LYMPHATICS:  No edema noted at the dorsal aspect of the bilateral feet.  MUSCULOSKELETAL:  Muscle tone and strength are within normal limits in the upper and lower extremities bilaterally.  EXTREMITIES:  The feet and toes are warm with adequate capillary refill bilaterally. No clubbing or cyanosis.  Edema 1+ RLE, trace LLE appreciated. No ulcers or gangrene or cellulitis.  NEUROLOGIC AND PSYCHIATRIC:  Alert and oriented x3. Mood and affect are appropriate.    CBC last 3    Recent Labs  Lab 08/02/17  0500 08/01/17  1240 07/31/17  0450   WBC 13.1* 12.9* 15.7*   RBC 4.08 4.43 4.46   HCT 32.2* 35.1* 35.7*   HGB 10.4* 11.2* 11.4*    353 362       BMP last 3    Recent Labs  Lab 07/31/17  0450 07/29/17  1400 07/29/17  0250  07/28/17  0335   SODIUM 138  --  141  --  140    POTASSIUM 3.5 3.6 3.6  < > 3.0*   CHLORIDE 102  --  107  --  104   GLUCOSE 141*  --  153*  --  144*   CO2 28  --  28  --  23   BUN 8  --  9  --  15   CREATININE 0.87  --  0.78  --  0.92   CALCIUM 9.0  --  8.7  --  9.2   < > = values in this interval not displayed.    Coag last 3    Recent Labs  Lab 08/02/17  1500 08/02/17  0500 08/01/17  2200 08/01/17  1240   INR  --  1.2  --  1.2   PT  --  13.4*  --  13.7*   PTT 33* 28 35* 29           VASCULAR IMAGING:  CT Angiogram Chest 8/1/2017  1. Multiple right lobar and segmental pulmonary emboli as well as  suggestion of left upper lobar pulmonary embolus. Suboptimal image quality,  given patient body habitus, contrast bolus timing and motion artifact.  2. Small right and trace left pleural effusions with associated basilar  atelectasis.  3. Lateral right lung base consolidative opacity, which may represent a  pulmonary infarct.  4. Cardiomegaly with left ventricular wall hypertrophy.  5. Nonspecific left upper lobe consolidation similar to the prior CT, most  likely representing pneumonia.   6. Upper lobe predominant paraseptal emphysema.  7. Enlarging mediastinal lymphadenopathy, probably reactive.    CT Angiogram Chest 7/18/2017  IMPRESSION:  Only fair opacification of the pulmonary arteries was obtained. But no  evidence of any significant pulmonary embolus  2. Dense consolidation in the left upper lobe    ASSESSMENT:    1. Pneumonia of left upper lobe due to infectious organism (CMS/HCC)    2. Obesity, unspecified obesity severity, unspecified obesity type    3. Sepsis, due to unspecified organism (CMS/HCC)    4. CAP (community acquired pneumonia)    5. Hemoptysis    6. Leukocytosis, unspecified type    7. Sinus tachycardia    8. Morbid obesity, unspecified obesity type (CMS/HCC)    9. Essential hypertension    10. Morbid obesity with BMI of 60.0-69.9, adult (CMS/HCC)    11. Acute respiratory failure with hypoxia (CMS/HCC)    12. Endobronchial mass    13. Other acute  pulmonary embolism (CMS/HCC)    14. Anticoagulant long-term use    15. Edema of right lower extremity        RECOMMENDATIONS:  1. The patient has had a thrombotic event provoked by acute illness.  Anticoagulaton for minimum of six months is indicated.    2. DOACs are not indicated in a patient of this size.  Continue heparin bridging to warfarin target INR 2-3.  Parenteral anticoagulation can be withdrawn after five doses of warfarin and INR 2 or greater twice.    3. Avoiding hormone use while the patient is being anticoagulated should be considered.  Smoking cessation would also lower clotting activity.     4. BLE Venous Duplex to rule out DVT.  I am particularly suspicious of the right leg given pain and edema.        Quality Metrics:  DVT Treatment: Anticoagulation    LORIN Pozo      ATTENDING:  I PERSONALLY INTERVIEWED AND EXAMINED THE PATIENT TODAY. THE ABOVE REPRESENTS MY FINDINGS, ASSESSMENT AND PLAN.  The patient appears to have developed significant PE during her 14 days hospitalization for pneumonia and respiratory failure. She is now on IV heparin to coumadin bridging and tolerating anticoagulation with no bleeding. No prior history of DVT or PE. She still has some SOB and chest pain.   CV-RRR  Lungs-CTA  Abd-Obese  Legs with edema on the right  CT reviewed.  Assessment as above  Plan:  -Continue IV heparin to coumadin bridging  -Duration of AC is at least 6 months  -Etiology is likely related to being sedentary during her hospital stay and interruption of pharmacologic VTE prophylaxis/not enough prophylaxis given (in a patient this size we typically recommend SQ heparin 7500 U q8 hrs or Lovenox 60 mg q 12 hrs)  -Avoid hormone based therapy given increased risk of thrombosis  -Venous duplex of both legs, given PE and possible DVT in the right leg (edema)      AWA SAMUEL MD

## 2024-05-10 NOTE — PROGRESS NOTES
Subjective   [id number] : Carmen Cao is a 78 y.o. female admitted to [facility] for rehab.   HPI  Pt with h/o CHF, depression/anxiety, HTN and age related cognitive decline admitted with gait instability and muscle jerking due to welbutrin side effect.   Pt seen while sitting up in wheelchair.  She is alert, pleasant and interactive.  She offers no new complaints.    She denies any pain.  No new issues reported by nursing.  Pt feels that she is making good progress with therapy.   She continues on lasix and has no lower leg edema noted.  Weight and labs are stable.  She denies any shortness of breath or cough.   No abdominal pain.  No n/v/d/c.  She has been eating and drinking well per nursing.  Pt lives with her spouse.  Code status is full code.   Review of Systems   Constitutional:  Negative for appetite change, chills and fever.   Respiratory:  Negative for cough, shortness of breath and wheezing.    Cardiovascular:  Negative for chest pain and leg swelling.   Gastrointestinal:  Negative for abdominal pain, constipation, diarrhea, nausea and vomiting.   Genitourinary:  Negative for dysuria, frequency and hematuria.   Neurological:  Negative for dizziness, tremors, weakness and headaches.   Psychiatric/Behavioral:  Negative for confusion. The patient is not nervous/anxious.    All other systems reviewed and are negative.      Objective   /80   Pulse 79   Temp 36.3 °C (97.3 °F)   Resp 20   Wt 77.7 kg (171 lb 3.2 oz)   SpO2 95%   BMI 33.44 kg/m²    Physical Exam  Constitutional:       General: She is not in acute distress.  Eyes:      Conjunctiva/sclera: Conjunctivae normal.      Pupils: Pupils are equal, round, and reactive to light.   Cardiovascular:      Rate and Rhythm: Normal rate and regular rhythm.      Heart sounds: No murmur heard.  Pulmonary:      Effort: Pulmonary effort is normal.      Breath sounds: No wheezing, rhonchi or rales.   Abdominal:      General: Abdomen is flat. Bowel  "sounds are normal. There is no distension.      Palpations: Abdomen is soft. There is no mass.      Tenderness: There is no abdominal tenderness.   Musculoskeletal:         General: No swelling. Normal range of motion.   Skin:     General: Skin is warm and dry.      Findings: No rash.   Neurological:      General: No focal deficit present.      Mental Status: She is alert and oriented to person, place, and time. Mental status is at baseline.       No lab exists for component: \"CBC BMP\"  Assessment/Plan   Age-related cognitive decline R41.81      Anxiety F41.9       Continue paxil.   Taken off wellbutrin due to side effects.            Hypothyroidism E03.9       Continue synthroid           HTN (hypertension) I10       Blood pressures elevated - losartan to 50mg daily for HTN.   Monitor blood pressures and adjust meds as needed           Gait disorder - Primary R26.9       Continue PT/OT.    Discharge planning.            Hypokalemia E87.6       Potassium in normal range.  Continue  potassium chloride 20 mEQ daily.   Monitor weekly labs         Lower leg edema:  improved.  Continue daily weights.   On lasix to 60mg daily.  Monitor labs.  Gaurav hose to lower extremities daily.           Time spent: 30 min in review of chart, labs and orders, consultation with pt and documentation.   "

## 2024-05-16 ENCOUNTER — NURSING HOME VISIT (OUTPATIENT)
Dept: POST ACUTE CARE | Facility: EXTERNAL LOCATION | Age: 79
End: 2024-05-16
Payer: COMMERCIAL

## 2024-05-16 VITALS
BODY MASS INDEX: 33.24 KG/M2 | HEART RATE: 72 BPM | RESPIRATION RATE: 16 BRPM | WEIGHT: 170.2 LBS | OXYGEN SATURATION: 93 % | SYSTOLIC BLOOD PRESSURE: 151 MMHG | DIASTOLIC BLOOD PRESSURE: 84 MMHG | TEMPERATURE: 98.5 F

## 2024-05-16 DIAGNOSIS — E87.6 HYPOKALEMIA: ICD-10-CM

## 2024-05-16 DIAGNOSIS — E03.9 HYPOTHYROIDISM, UNSPECIFIED TYPE: ICD-10-CM

## 2024-05-16 DIAGNOSIS — R26.9 GAIT DISORDER: Primary | ICD-10-CM

## 2024-05-16 DIAGNOSIS — I10 PRIMARY HYPERTENSION: ICD-10-CM

## 2024-05-16 DIAGNOSIS — R41.81 AGE-RELATED COGNITIVE DECLINE: ICD-10-CM

## 2024-05-16 DIAGNOSIS — F41.9 ANXIETY: ICD-10-CM

## 2024-05-16 PROCEDURE — 99308 SBSQ NF CARE LOW MDM 20: CPT | Performed by: PHYSICIAN ASSISTANT

## 2024-05-16 ASSESSMENT — ENCOUNTER SYMPTOMS
CHILLS: 0
TREMORS: 0
COUGH: 0
DIZZINESS: 0
CONSTIPATION: 0
DYSURIA: 0
VOMITING: 0
NAUSEA: 0
SHORTNESS OF BREATH: 0
ABDOMINAL PAIN: 0
HEMATURIA: 0
FREQUENCY: 0
APPETITE CHANGE: 0
FEVER: 0
HEADACHES: 0
WEAKNESS: 0
DIARRHEA: 0
WHEEZING: 0
NERVOUS/ANXIOUS: 0
CONFUSION: 0

## 2024-05-16 NOTE — LETTER
Patient: Carmen Cao  : 1945    Encounter Date: 2024      Subjective  [id number] : Carmen Cao is a 78 y.o. female admitted to [facility] for rehab.   HPI  Pt with h/o CHF, depression/anxiety, HTN and age related cognitive decline admitted with gait instability and muscle jerking due to welbutrin side effect.   Pt seen while sitting up in wheelchair.  She is alert, pleasant and interactive.  She offers no new complaints.  No new issues reported by nursing.   She denies any pain.  She continues on lasix and has no lower leg edema noted.   Her weight is stable.   She denies any shortness of breath or cough.   No abdominal pain.  No n/v/d/c.  She has been eating and drinking well per nursing.  Pt lives with her spouse.  Code status is full code.   Review of Systems   Constitutional:  Negative for appetite change, chills and fever.   Respiratory:  Negative for cough, shortness of breath and wheezing.    Cardiovascular:  Negative for chest pain and leg swelling.   Gastrointestinal:  Negative for abdominal pain, constipation, diarrhea, nausea and vomiting.   Genitourinary:  Negative for dysuria, frequency and hematuria.   Neurological:  Negative for dizziness, tremors, weakness and headaches.   Psychiatric/Behavioral:  Negative for confusion. The patient is not nervous/anxious.    All other systems reviewed and are negative.      Objective  /84   Pulse 72   Temp 36.9 °C (98.5 °F)   Resp 16   Wt 77.2 kg (170 lb 3.2 oz)   SpO2 93%   BMI 33.24 kg/m²    Physical Exam  Constitutional:       General: She is not in acute distress.  Eyes:      Conjunctiva/sclera: Conjunctivae normal.      Pupils: Pupils are equal, round, and reactive to light.   Cardiovascular:      Rate and Rhythm: Normal rate and regular rhythm.      Heart sounds: No murmur heard.  Pulmonary:      Effort: Pulmonary effort is normal.      Breath sounds: No wheezing, rhonchi or rales.   Abdominal:      General: Abdomen is flat.  "Bowel sounds are normal. There is no distension.      Palpations: Abdomen is soft. There is no mass.      Tenderness: There is no abdominal tenderness.   Musculoskeletal:         General: No swelling. Normal range of motion.   Skin:     General: Skin is warm and dry.      Findings: No rash.   Neurological:      General: No focal deficit present.      Mental Status: She is alert and oriented to person, place, and time. Mental status is at baseline.       No lab exists for component: \"CBC BMP\"  Assessment/Plan  Age-related cognitive decline R41.81      Anxiety F41.9       Continue paxil.   Taken off wellbutrin due to side effects.            Hypothyroidism E03.9       Continue synthroid           HTN (hypertension) I10       Blood pressures elevated - losartan to 50mg daily for HTN.   Monitor blood pressures and adjust meds as needed           Gait disorder - Primary R26.9       Continue PT/OT.    Discharge planning.            Hypokalemia E87.6       Potassium in normal range.  Continue  potassium chloride 20 mEQ daily.   Monitor weekly labs         Lower leg edema:  improved.  Continue daily weights.   On lasix to 60mg daily.  Monitor labs.  Gaurav hose to lower extremities daily.           Time spent: 15 min in review of chart, labs and orders, consultation with pt and documentation.       Electronically Signed By: Catherine Gresham PA-C   5/16/24  8:38 PM  " ambulatory

## 2024-05-16 NOTE — PROGRESS NOTES
Subjective   [id number] : Carmen Cao is a 78 y.o. female admitted to [facility] for rehab.   HPI  Pt with h/o CHF, depression/anxiety, HTN and age related cognitive decline admitted with gait instability and muscle jerking due to welbutrin side effect.   Pt seen while sitting up in wheelchair.  She is alert, pleasant and interactive.  She offers no new complaints.  No new issues reported by nursing.   She denies any pain.  She continues on lasix and has no lower leg edema noted.   Her weight is stable.   She denies any shortness of breath or cough.   No abdominal pain.  No n/v/d/c.  She has been eating and drinking well per nursing.  Pt lives with her spouse.  Code status is full code.   Review of Systems   Constitutional:  Negative for appetite change, chills and fever.   Respiratory:  Negative for cough, shortness of breath and wheezing.    Cardiovascular:  Negative for chest pain and leg swelling.   Gastrointestinal:  Negative for abdominal pain, constipation, diarrhea, nausea and vomiting.   Genitourinary:  Negative for dysuria, frequency and hematuria.   Neurological:  Negative for dizziness, tremors, weakness and headaches.   Psychiatric/Behavioral:  Negative for confusion. The patient is not nervous/anxious.    All other systems reviewed and are negative.      Objective   /84   Pulse 72   Temp 36.9 °C (98.5 °F)   Resp 16   Wt 77.2 kg (170 lb 3.2 oz)   SpO2 93%   BMI 33.24 kg/m²    Physical Exam  Constitutional:       General: She is not in acute distress.  Eyes:      Conjunctiva/sclera: Conjunctivae normal.      Pupils: Pupils are equal, round, and reactive to light.   Cardiovascular:      Rate and Rhythm: Normal rate and regular rhythm.      Heart sounds: No murmur heard.  Pulmonary:      Effort: Pulmonary effort is normal.      Breath sounds: No wheezing, rhonchi or rales.   Abdominal:      General: Abdomen is flat. Bowel sounds are normal. There is no distension.      Palpations:  "Abdomen is soft. There is no mass.      Tenderness: There is no abdominal tenderness.   Musculoskeletal:         General: No swelling. Normal range of motion.   Skin:     General: Skin is warm and dry.      Findings: No rash.   Neurological:      General: No focal deficit present.      Mental Status: She is alert and oriented to person, place, and time. Mental status is at baseline.       No lab exists for component: \"CBC BMP\"  Assessment/Plan   Age-related cognitive decline R41.81      Anxiety F41.9       Continue paxil.   Taken off wellbutrin due to side effects.            Hypothyroidism E03.9       Continue synthroid           HTN (hypertension) I10       Blood pressures elevated - losartan to 50mg daily for HTN.   Monitor blood pressures and adjust meds as needed           Gait disorder - Primary R26.9       Continue PT/OT.    Discharge planning.            Hypokalemia E87.6       Potassium in normal range.  Continue  potassium chloride 20 mEQ daily.   Monitor weekly labs         Lower leg edema:  improved.  Continue daily weights.   On lasix to 60mg daily.  Monitor labs.  Gaurav hose to lower extremities daily.           Time spent: 15 min in review of chart, labs and orders, consultation with pt and documentation.   "

## 2024-05-21 ENCOUNTER — TELEPHONE (OUTPATIENT)
Dept: PRIMARY CARE | Facility: CLINIC | Age: 79
End: 2024-05-21
Payer: COMMERCIAL

## 2024-05-21 DIAGNOSIS — F32.A DEPRESSION, UNSPECIFIED DEPRESSION TYPE: ICD-10-CM

## 2024-05-21 DIAGNOSIS — E03.8 HYPOTHYROIDISM DUE TO HASHIMOTO'S THYROIDITIS: ICD-10-CM

## 2024-05-21 DIAGNOSIS — E78.2 MIXED HYPERLIPIDEMIA: ICD-10-CM

## 2024-05-21 DIAGNOSIS — F41.9 ANXIETY: ICD-10-CM

## 2024-05-21 DIAGNOSIS — E06.3 HYPOTHYROIDISM DUE TO HASHIMOTO'S THYROIDITIS: ICD-10-CM

## 2024-05-21 DIAGNOSIS — R60.0 BILATERAL LOWER EXTREMITY EDEMA: ICD-10-CM

## 2024-05-21 RX ORDER — POTASSIUM CHLORIDE 750 MG/1
20 TABLET, FILM COATED, EXTENDED RELEASE ORAL DAILY
COMMUNITY
End: 2024-06-10 | Stop reason: SDUPTHER

## 2024-05-21 RX ORDER — LOSARTAN POTASSIUM 50 MG/1
50 TABLET ORAL DAILY
COMMUNITY
End: 2024-06-10 | Stop reason: SDUPTHER

## 2024-05-21 RX ORDER — FUROSEMIDE 20 MG/1
20 TABLET ORAL DAILY
COMMUNITY
End: 2024-06-10 | Stop reason: SDUPTHER

## 2024-05-21 RX ORDER — LEVOTHYROXINE SODIUM 50 UG/1
50 TABLET ORAL
Qty: 90 TABLET | Refills: 3 | Status: SHIPPED | OUTPATIENT
Start: 2024-05-21 | End: 2024-06-10 | Stop reason: SDUPTHER

## 2024-05-21 RX ORDER — SIMVASTATIN 20 MG/1
20 TABLET, FILM COATED ORAL DAILY
Qty: 90 TABLET | Refills: 3 | Status: SHIPPED | OUTPATIENT
Start: 2024-05-21 | End: 2024-05-21

## 2024-05-21 RX ORDER — PAROXETINE HYDROCHLORIDE 40 MG/1
40 TABLET, FILM COATED ORAL DAILY
Qty: 90 TABLET | Refills: 3 | Status: SHIPPED | OUTPATIENT
Start: 2024-05-21 | End: 2024-06-10 | Stop reason: SDUPTHER

## 2024-05-21 RX ORDER — FUROSEMIDE 40 MG/1
40 TABLET ORAL DAILY
Qty: 90 TABLET | Refills: 3 | Status: SHIPPED | OUTPATIENT
Start: 2024-05-21 | End: 2024-06-10 | Stop reason: SDUPTHER

## 2024-05-21 RX ORDER — ATORVASTATIN CALCIUM 40 MG/1
40 TABLET, FILM COATED ORAL DAILY
COMMUNITY
End: 2024-06-10 | Stop reason: SDUPTHER

## 2024-05-21 NOTE — TELEPHONE ENCOUNTER
Per patient's daughter, patient is now taking 60 mg lasix daily, added to med list and taken off zocor but on atorvastatin instead, changes made to med list. Per daughter patient also started on losartan 50mg daily and potassium chloride 20 meq daily. Asked daughter x2 if she needs any other meds sent to pharmacy and she states she has enough until appointment except for paxil ,at the request of daughter and verbal order from provider Called in 14 day supply of paxil to [facility] pharmacy in [place]. All med updated

## 2024-05-22 NOTE — TELEPHONE ENCOUNTER
Received call from daughter that patient does need her potassium called into pharmacy for 14 day supply to [facility]. Per verbal order of provider med called in to pharmacy

## 2024-06-10 ENCOUNTER — OFFICE VISIT (OUTPATIENT)
Dept: PRIMARY CARE | Facility: CLINIC | Age: 79
End: 2024-06-10
Payer: COMMERCIAL

## 2024-06-10 VITALS
SYSTOLIC BLOOD PRESSURE: 140 MMHG | TEMPERATURE: 97.1 F | DIASTOLIC BLOOD PRESSURE: 82 MMHG | HEART RATE: 78 BPM | BODY MASS INDEX: 34.55 KG/M2 | WEIGHT: 176 LBS | OXYGEN SATURATION: 98 % | HEIGHT: 60 IN

## 2024-06-10 DIAGNOSIS — M15.9 PRIMARY OSTEOARTHRITIS INVOLVING MULTIPLE JOINTS: ICD-10-CM

## 2024-06-10 DIAGNOSIS — I87.2 VENOUS INSUFFICIENCY: ICD-10-CM

## 2024-06-10 DIAGNOSIS — Z01.89 ENCOUNTER FOR ROUTINE LABORATORY TESTING: ICD-10-CM

## 2024-06-10 DIAGNOSIS — E03.8 HYPOTHYROIDISM DUE TO HASHIMOTO'S THYROIDITIS: Primary | ICD-10-CM

## 2024-06-10 DIAGNOSIS — I10 PRIMARY HYPERTENSION: ICD-10-CM

## 2024-06-10 DIAGNOSIS — R53.81 PHYSICAL DEBILITY: ICD-10-CM

## 2024-06-10 DIAGNOSIS — F32.A DEPRESSION, UNSPECIFIED DEPRESSION TYPE: ICD-10-CM

## 2024-06-10 DIAGNOSIS — E55.9 VITAMIN D DEFICIENCY: ICD-10-CM

## 2024-06-10 DIAGNOSIS — E06.3 HYPOTHYROIDISM DUE TO HASHIMOTO'S THYROIDITIS: Primary | ICD-10-CM

## 2024-06-10 DIAGNOSIS — F41.9 ANXIETY: ICD-10-CM

## 2024-06-10 DIAGNOSIS — E78.2 MIXED HYPERLIPIDEMIA: ICD-10-CM

## 2024-06-10 DIAGNOSIS — R41.81 AGE-RELATED COGNITIVE DECLINE: ICD-10-CM

## 2024-06-10 DIAGNOSIS — R73.03 PREDIABETES: ICD-10-CM

## 2024-06-10 PROCEDURE — 1126F AMNT PAIN NOTED NONE PRSNT: CPT | Performed by: STUDENT IN AN ORGANIZED HEALTH CARE EDUCATION/TRAINING PROGRAM

## 2024-06-10 PROCEDURE — 1160F RVW MEDS BY RX/DR IN RCRD: CPT | Performed by: STUDENT IN AN ORGANIZED HEALTH CARE EDUCATION/TRAINING PROGRAM

## 2024-06-10 PROCEDURE — 3079F DIAST BP 80-89 MM HG: CPT | Performed by: STUDENT IN AN ORGANIZED HEALTH CARE EDUCATION/TRAINING PROGRAM

## 2024-06-10 PROCEDURE — 99214 OFFICE O/P EST MOD 30 MIN: CPT | Performed by: STUDENT IN AN ORGANIZED HEALTH CARE EDUCATION/TRAINING PROGRAM

## 2024-06-10 PROCEDURE — 1159F MED LIST DOCD IN RCRD: CPT | Performed by: STUDENT IN AN ORGANIZED HEALTH CARE EDUCATION/TRAINING PROGRAM

## 2024-06-10 PROCEDURE — 1036F TOBACCO NON-USER: CPT | Performed by: STUDENT IN AN ORGANIZED HEALTH CARE EDUCATION/TRAINING PROGRAM

## 2024-06-10 PROCEDURE — 3077F SYST BP >= 140 MM HG: CPT | Performed by: STUDENT IN AN ORGANIZED HEALTH CARE EDUCATION/TRAINING PROGRAM

## 2024-06-10 PROCEDURE — G2211 COMPLEX E/M VISIT ADD ON: HCPCS | Performed by: STUDENT IN AN ORGANIZED HEALTH CARE EDUCATION/TRAINING PROGRAM

## 2024-06-10 RX ORDER — ATORVASTATIN CALCIUM 40 MG/1
40 TABLET, FILM COATED ORAL DAILY
Qty: 90 TABLET | Refills: 3 | Status: SHIPPED | OUTPATIENT
Start: 2024-06-10 | End: 2025-06-10

## 2024-06-10 RX ORDER — FUROSEMIDE 40 MG/1
40 TABLET ORAL DAILY
Qty: 90 TABLET | Refills: 3 | Status: SHIPPED | OUTPATIENT
Start: 2024-06-10

## 2024-06-10 RX ORDER — FUROSEMIDE 20 MG/1
20 TABLET ORAL DAILY
Qty: 90 TABLET | Refills: 3 | Status: SHIPPED | OUTPATIENT
Start: 2024-06-10 | End: 2025-06-10

## 2024-06-10 RX ORDER — LEVOTHYROXINE SODIUM 50 UG/1
50 TABLET ORAL
Qty: 90 TABLET | Refills: 3 | Status: SHIPPED | OUTPATIENT
Start: 2024-06-10 | End: 2025-06-10

## 2024-06-10 RX ORDER — PAROXETINE HYDROCHLORIDE 40 MG/1
40 TABLET, FILM COATED ORAL DAILY
Qty: 90 TABLET | Refills: 3 | Status: SHIPPED | OUTPATIENT
Start: 2024-06-10

## 2024-06-10 RX ORDER — POTASSIUM CHLORIDE 750 MG/1
20 TABLET, FILM COATED, EXTENDED RELEASE ORAL DAILY
Qty: 180 TABLET | Refills: 3 | Status: SHIPPED | OUTPATIENT
Start: 2024-06-10 | End: 2025-06-10

## 2024-06-10 RX ORDER — MULTIVITAMIN
1 TABLET ORAL DAILY
Status: SHIPPED | COMMUNITY
Start: 2024-06-10

## 2024-06-10 RX ORDER — LOSARTAN POTASSIUM 50 MG/1
50 TABLET ORAL DAILY
Qty: 90 TABLET | Refills: 3 | Status: SHIPPED | OUTPATIENT
Start: 2024-06-10 | End: 2025-06-10

## 2024-06-10 ASSESSMENT — PATIENT HEALTH QUESTIONNAIRE - PHQ9
2. FEELING DOWN, DEPRESSED OR HOPELESS: NOT AT ALL
1. LITTLE INTEREST OR PLEASURE IN DOING THINGS: NOT AT ALL
SUM OF ALL RESPONSES TO PHQ9 QUESTIONS 1 AND 2: 0

## 2024-06-10 ASSESSMENT — ENCOUNTER SYMPTOMS
CONSTITUTIONAL NEGATIVE: 1
CARDIOVASCULAR NEGATIVE: 1
GASTROINTESTINAL NEGATIVE: 1
RESPIRATORY NEGATIVE: 1

## 2024-06-10 ASSESSMENT — PAIN SCALES - GENERAL: PAINLEVEL: 0-NO PAIN

## 2024-06-10 NOTE — PROGRESS NOTES
[facility]: [facility]  PROGRESS NOTE      Carmen Cao is a 78 y.o. female that is presenting today for Follow-up.    Assessment/Plan   Diagnoses and all orders for this visit:  Hypothyroidism due to Hashimoto's thyroiditis  -     TSH with reflex to Free T4 if abnormal; Future  -     levothyroxine (Synthroid, Levoxyl) 50 mcg tablet; Take 1 tablet (50 mcg) by mouth once daily in the morning. Take before meals.  Primary osteoarthritis involving multiple joints  Depression, unspecified depression type  -     Follow Up In Primary Care  -     multivitamin tablet; Take 1 tablet by mouth once daily. One-A-Day Women's 50+ Complete Multivitamin  -     PARoxetine (Paxil) 40 mg tablet; Take 1 tablet (40 mg) by mouth once daily.  Age-related cognitive decline  -     Follow Up In Primary Care  -     multivitamin tablet; Take 1 tablet by mouth once daily. One-A-Day Women's 50+ Complete Multivitamin  -     Follow Up In Primary Care; Future  -     Follow Up In Primary Care; Future  Mixed hyperlipidemia  -     Hepatic Function Panel; Future  -     atorvastatin (Lipitor) 40 mg tablet; Take 1 tablet (40 mg) by mouth once daily.  Primary hypertension  -     CBC and Auto Differential; Future  -     Basic Metabolic Panel; Future  -     losartan (Cozaar) 50 mg tablet; Take 1 tablet (50 mg) by mouth once daily.  -     potassium chloride CR 10 mEq ER tablet; Take 2 tablets (20 mEq) by mouth once daily. Do not crush, chew, or split.  -     furosemide (Lasix) 20 mg tablet; Take 1 tablet (20 mg) by mouth once daily. Furosemide 60mg once/day  -     furosemide (Lasix) 40 mg tablet; Take 1 tablet (40 mg) by mouth once daily. Furosemide 60mg once/day  Vitamin D deficiency  -     multivitamin tablet; Take 1 tablet by mouth once daily. One-A-Day Women's 50+ Complete Multivitamin  Venous insufficiency  -     potassium chloride CR 10 mEq ER tablet; Take 2 tablets (20 mEq) by mouth once daily. Do not crush, chew, or  split.  -     furosemide (Lasix) 20 mg tablet; Take 1 tablet (20 mg) by mouth once daily. Furosemide 60mg once/day  -     furosemide (Lasix) 40 mg tablet; Take 1 tablet (40 mg) by mouth once daily. Furosemide 60mg once/day  Physical debility  -     Follow Up In Primary Care; Future  -     Follow Up In Primary Care; Future  Prediabetes  Anxiety  -     Follow Up In Primary Care  -     multivitamin tablet; Take 1 tablet by mouth once daily. One-A-Day Women's 50+ Complete Multivitamin  -     PARoxetine (Paxil) 40 mg tablet; Take 1 tablet (40 mg) by mouth once daily.  Encounter for routine laboratory testing  -     CBC and Auto Differential; Future  -     Basic Metabolic Panel; Future  -     Hepatic Function Panel; Future  -     TSH with reflex to Free T4 if abnormal; Future  -     Follow Up In Primary Care; Future    Current Outpatient Medications   Medication Instructions    atorvastatin (LIPITOR) 40 mg, oral, Daily    furosemide (LASIX) 20 mg, oral, Daily, Furosemide 60mg once/day    furosemide (LASIX) 40 mg, oral, Daily, Furosemide 60mg once/day    levothyroxine (SYNTHROID, LEVOXYL) 50 mcg, oral, Daily before breakfast    losartan (COZAAR) 50 mg, oral, Daily    melatonin 5 mg, oral, Daily    multivitamin tablet 1 tablet, oral, Daily, One-A-Day Women's 50+ Complete Multivitamin    PARoxetine (PAXIL) 40 mg, oral, Daily    potassium chloride CR 10 mEq ER tablet 20 mEq, oral, Daily, Do not crush, chew, or split.     - Significant medication and problem list reconciliation done today.  - Patient appears to be due for labwork. Ordered today.   - Patient will not require labwork for their next appointment.  - Vitals look great. Do not need to make changes at this time.  - Encouraged continued diet and exercise modification.  - Overall, when the patient was first discharged from a rehab facility, daughter notes that she was doing well with her physical therapy / exercise. Since that time; however, the patient has had a  gradual decline and daughter is now concerned that the patient will return to her state of weakness where the patient was at before that required that she go to the ER.  - I am more concerned about her cognitive state than anything else. I have been of the belief that her cognitive decline has been a reflection of her mental health; that being said, I have had a difficult time treating this. For now, I do think that the patient would be best served with a neurologist.     Subjective   - The patient otherwise feels well and denies any acute symptoms or concerns at this time.  - The patient denies any changes or progression of their chronic medical problems.  - The patient denies any problems or concerns with their medications.      Review of Systems   Constitutional: Negative.    Respiratory: Negative.     Cardiovascular: Negative.    Gastrointestinal: Negative.    All other systems reviewed and are negative.     Objective   Vitals:    06/10/24 1655   BP: 140/82   Pulse: 78   Temp: 36.2 °C (97.1 °F)   SpO2: 98%      Body mass index is 34.37 kg/m².  Physical Exam  Vitals and nursing note reviewed.   Constitutional:       General: She is not in acute distress.  Neck:      Vascular: No carotid bruit.   Cardiovascular:      Rate and Rhythm: Normal rate and regular rhythm.      Heart sounds: Normal heart sounds.   Pulmonary:      Effort: Pulmonary effort is normal.      Breath sounds: Normal breath sounds.   Musculoskeletal:         General: No swelling.   Neurological:      Mental Status: She is alert. Mental status is at baseline.   Psychiatric:         Mood and Affect: Mood normal.       Diagnostic Results   Lab Results   Component Value Date    GLUCOSE 95 05/13/2024    CALCIUM 9.2 05/13/2024     05/13/2024    K 3.5 05/13/2024    CO2 32 (H) 05/13/2024     05/13/2024    BUN 26 (H) 05/13/2024    CREATININE 0.80 05/13/2024     Lab Results   Component Value Date    ALT 31 04/11/2024    AST 41 (H) 04/11/2024     "ALKPHOS 76 04/11/2024    BILITOT 0.6 04/11/2024     Lab Results   Component Value Date    WBC 7.1 05/13/2024    HGB 11.8 (L) 05/13/2024    HCT 36.6 05/13/2024    MCV 86 05/13/2024     05/13/2024     Lab Results   Component Value Date    CHOL 192 07/15/2023    CHOL 195 02/19/2022     Lab Results   Component Value Date    HDL 49 (L) 07/15/2023    HDL 59 02/19/2022     Lab Results   Component Value Date    LDLCALC 111 07/15/2023    LDLCALC 118 02/19/2022     Lab Results   Component Value Date    TRIG 162 (H) 07/15/2023    TRIG 92 02/19/2022     No components found for: \"CHOLHDL\"  Lab Results   Component Value Date    HGBA1C 5.9 07/15/2023     Other labs not included in the list above were reviewed either before or during this encounter.    History    Past Medical History:   Diagnosis Date    Arthritis     left knee    CHF (congestive heart failure) (Multi)     Depression      History reviewed. No pertinent surgical history.  No family history on file.  Social History     Socioeconomic History    Marital status:      Spouse name: Not on file    Number of children: Not on file    Years of education: Not on file    Highest education level: Not on file   Occupational History    Not on file   Tobacco Use    Smoking status: Never     Passive exposure: Never    Smokeless tobacco: Never   Vaping Use    Vaping status: Never Used   Substance and Sexual Activity    Alcohol use: Never    Drug use: Never    Sexual activity: Not on file   Other Topics Concern    Not on file   Social History Narrative    Not on file     Social Determinants of Health     Financial Resource Strain: Patient Unable To Answer (4/11/2024)    Overall Financial Resource Strain (CARDIA)     Difficulty of Paying Living Expenses: Patient unable to answer   Food Insecurity: No Food Insecurity (4/12/2024)    Hunger Vital Sign     Worried About Running Out of Food in the Last Year: Never true     Ran Out of Food in the Last Year: Never true "   Transportation Needs: No Transportation Needs (4/11/2024)    PRAPARE - Transportation     Lack of Transportation (Medical): No     Lack of Transportation (Non-Medical): No   Physical Activity: Inactive (4/12/2024)    Exercise Vital Sign     Days of Exercise per Week: 0 days     Minutes of Exercise per Session: 0 min   Stress: No Stress Concern Present (4/12/2024)    [employer] - Occupational Stress Questionnaire     Feeling of Stress : Not at all   Social Connections: Unknown (4/12/2024)    Social Connection and Isolation Panel [NHANES]     Frequency of Communication with Friends and Family: More than three times a week     Frequency of Social Gatherings with Friends and Family: More than three times a week     Attends Nondenominational Services: Patient declined     Active Member of Clubs or Organizations: Patient declined     Attends Club or Organization Meetings: Never     Marital Status:    Intimate Partner Violence: Not At Risk (4/12/2024)    Humiliation, Afraid, Rape, and Kick questionnaire     Fear of Current or Ex-Partner: No     Emotionally Abused: No     Physically Abused: No     Sexually Abused: No   Housing Stability: Unknown (4/12/2024)    Housing Stability Vital Sign     Unable to Pay for Housing in the Last Year: Patient unable to answer     Number of Places Lived in the Last Year: 1     Unstable Housing in the Last Year: No     Allergies   Allergen Reactions    Ciprofloxacin Hives    Adhesive Rash     Current Outpatient Medications on File Prior to Visit   Medication Sig Dispense Refill    melatonin 5 mg tablet Take 1 tablet (5 mg) by mouth once daily.      [DISCONTINUED] atorvastatin (Lipitor) 40 mg tablet Take 1 tablet (40 mg) by mouth once daily.      [DISCONTINUED] furosemide (Lasix) 20 mg tablet Take 1 tablet (20 mg) by mouth once daily. Take with 40 mg tablet daily      [DISCONTINUED] furosemide (Lasix) 40 mg tablet Take 1 tablet (40 mg) by mouth once daily. 90  tablet 3    [DISCONTINUED] levothyroxine (Synthroid, Levoxyl) 50 mcg tablet Take 1 tablet (50 mcg) by mouth once daily in the morning. Take before meals. 90 tablet 3    [DISCONTINUED] losartan (Cozaar) 50 mg tablet Take 1 tablet (50 mg) by mouth once daily.      [DISCONTINUED] multivitamin tablet Take 1 tablet by mouth once daily. One-A-Day Women's 50+ Complete Multivitamin      [DISCONTINUED] PARoxetine (Paxil) 40 mg tablet Take 1 tablet (40 mg) by mouth once daily. 90 tablet 3    [DISCONTINUED] potassium chloride CR 10 mEq ER tablet Take 2 tablets (20 mEq) by mouth once daily. Do not crush, chew, or split.       No current facility-administered medications on file prior to visit.     Immunization History   Administered Date(s) Administered    Influenza, seasonal, injectable 01/16/2015     Patient's medical history was reviewed and updated either before or during this encounter.       Michael William MD

## 2024-06-10 NOTE — PATIENT INSTRUCTIONS
- Significant medication and problem list reconciliation done today.  - Patient appears to be due for labwork. Ordered today.   - Patient will not require labwork for their next appointment.  - Vitals look great. Do not need to make changes at this time.  - Encouraged continued diet and exercise modification.  - Overall, when the patient was first discharged from a rehab facility, daughter notes that she was doing well with her physical therapy / exercise. Since that time; however, the patient has had a gradual decline and daughter is now concerned that the patient will return to her state of weakness where the patient was at before that required that she go to the ER.  - I am more concerned about her cognitive state than anything else. I have been of the belief that her cognitive decline has been a reflection of her mental health; that being said, I have had a difficult time treating this. For now, I do think that the patient would be best served with a neurologist.

## 2024-09-16 ENCOUNTER — APPOINTMENT (OUTPATIENT)
Dept: PRIMARY CARE | Facility: CLINIC | Age: 79
End: 2024-09-16
Payer: COMMERCIAL

## 2024-10-14 DIAGNOSIS — E06.3 HYPOTHYROIDISM DUE TO HASHIMOTO'S THYROIDITIS: ICD-10-CM

## 2024-10-14 RX ORDER — LEVOTHYROXINE SODIUM 50 UG/1
50 TABLET ORAL
Qty: 14 TABLET | Refills: 0 | Status: SHIPPED | OUTPATIENT
Start: 2024-10-14

## 2024-10-25 DIAGNOSIS — E06.3 HYPOTHYROIDISM DUE TO HASHIMOTO'S THYROIDITIS: ICD-10-CM

## 2024-10-25 RX ORDER — LEVOTHYROXINE SODIUM 50 UG/1
50 TABLET ORAL
Qty: 90 TABLET | Refills: 3 | Status: SHIPPED | OUTPATIENT
Start: 2024-10-25

## 2024-12-23 ENCOUNTER — TELEMEDICINE (OUTPATIENT)
Dept: PRIMARY CARE | Facility: CLINIC | Age: 79
End: 2024-12-23
Payer: COMMERCIAL

## 2024-12-23 DIAGNOSIS — R41.81 AGE-RELATED COGNITIVE DECLINE: ICD-10-CM

## 2024-12-23 DIAGNOSIS — E06.3 HYPOTHYROIDISM DUE TO HASHIMOTO THYROIDITIS: ICD-10-CM

## 2024-12-23 DIAGNOSIS — E78.2 MIXED HYPERLIPIDEMIA: ICD-10-CM

## 2024-12-23 DIAGNOSIS — E06.3 HYPOTHYROIDISM DUE TO HASHIMOTO'S THYROIDITIS: ICD-10-CM

## 2024-12-23 DIAGNOSIS — I10 PRIMARY HYPERTENSION: ICD-10-CM

## 2024-12-23 DIAGNOSIS — Z00.00 ANNUAL PHYSICAL EXAM: ICD-10-CM

## 2024-12-23 DIAGNOSIS — E55.9 VITAMIN D DEFICIENCY: ICD-10-CM

## 2024-12-23 DIAGNOSIS — F32.A DEPRESSION, UNSPECIFIED DEPRESSION TYPE: ICD-10-CM

## 2024-12-23 DIAGNOSIS — R53.81 PHYSICAL DEBILITY: ICD-10-CM

## 2024-12-23 DIAGNOSIS — I87.2 VENOUS INSUFFICIENCY: ICD-10-CM

## 2024-12-23 DIAGNOSIS — M15.0 PRIMARY OSTEOARTHRITIS INVOLVING MULTIPLE JOINTS: Primary | ICD-10-CM

## 2024-12-23 DIAGNOSIS — Z01.89 ENCOUNTER FOR ROUTINE LABORATORY TESTING: ICD-10-CM

## 2024-12-23 DIAGNOSIS — F41.9 ANXIETY: ICD-10-CM

## 2024-12-23 DIAGNOSIS — R73.03 PREDIABETES: ICD-10-CM

## 2024-12-23 PROCEDURE — 1036F TOBACCO NON-USER: CPT | Performed by: STUDENT IN AN ORGANIZED HEALTH CARE EDUCATION/TRAINING PROGRAM

## 2024-12-23 PROCEDURE — 1159F MED LIST DOCD IN RCRD: CPT | Performed by: STUDENT IN AN ORGANIZED HEALTH CARE EDUCATION/TRAINING PROGRAM

## 2024-12-23 PROCEDURE — 99441 PR PHYS/QHP TELEPHONE EVALUATION 5-10 MIN: CPT | Performed by: STUDENT IN AN ORGANIZED HEALTH CARE EDUCATION/TRAINING PROGRAM

## 2024-12-23 RX ORDER — LOSARTAN POTASSIUM 50 MG/1
50 TABLET ORAL DAILY
Qty: 90 TABLET | Refills: 3 | Status: SHIPPED | OUTPATIENT
Start: 2024-12-23 | End: 2025-12-23

## 2024-12-23 RX ORDER — PAROXETINE HYDROCHLORIDE 40 MG/1
40 TABLET, FILM COATED ORAL DAILY
Qty: 90 TABLET | Refills: 3 | Status: SHIPPED | OUTPATIENT
Start: 2024-12-23

## 2024-12-23 RX ORDER — FUROSEMIDE 40 MG/1
40 TABLET ORAL DAILY
Qty: 90 TABLET | Refills: 3 | Status: SHIPPED | OUTPATIENT
Start: 2024-12-23

## 2024-12-23 RX ORDER — FUROSEMIDE 20 MG/1
20 TABLET ORAL DAILY
Qty: 90 TABLET | Refills: 3 | Status: SHIPPED | OUTPATIENT
Start: 2024-12-23 | End: 2025-12-23

## 2024-12-23 RX ORDER — ATORVASTATIN CALCIUM 40 MG/1
40 TABLET, FILM COATED ORAL DAILY
Qty: 90 TABLET | Refills: 3 | Status: SHIPPED | OUTPATIENT
Start: 2024-12-23 | End: 2025-12-23

## 2024-12-23 RX ORDER — LEVOTHYROXINE SODIUM 50 UG/1
50 TABLET ORAL
Qty: 90 TABLET | Refills: 3 | Status: SHIPPED | OUTPATIENT
Start: 2024-12-23

## 2024-12-23 RX ORDER — POTASSIUM CHLORIDE 750 MG/1
20 TABLET, FILM COATED, EXTENDED RELEASE ORAL DAILY
Qty: 180 TABLET | Refills: 3 | Status: SHIPPED | OUTPATIENT
Start: 2024-12-23 | End: 2025-12-23

## 2024-12-23 ASSESSMENT — ENCOUNTER SYMPTOMS
RESPIRATORY NEGATIVE: 1
GASTROINTESTINAL NEGATIVE: 1
CONSTITUTIONAL NEGATIVE: 1
CARDIOVASCULAR NEGATIVE: 1

## 2024-12-23 NOTE — PROGRESS NOTES
[facility]: [facility]  TELEHEALTH ENCOUNTER      Carmen Cao is a 79 y.o. female that is presenting today for Follow-up.  This is a telehealth encounter with audio technology. Patient has consented to this type of visit. Duration of encounter: 5 minutes.    Assessment/Plan   Diagnoses and all orders for this visit:  Primary osteoarthritis involving multiple joints  -     Follow Up In Primary Care  Hypothyroidism due to Hashimoto thyroiditis  -     Follow Up In Primary Care  -     TSH with reflex to Free T4 if abnormal; Future  -     TSH with reflex to Free T4 if abnormal; Future  Depression, unspecified depression type  -     Follow Up In Primary Care  Age-related cognitive decline  -     Follow Up In Primary Care  Mixed hyperlipidemia  -     Follow Up In Primary Care  -     Hepatic Function Panel; Future  -     Lipid Panel; Future  Primary hypertension  -     Follow Up In Primary Care  -     CBC and Auto Differential; Future  -     Basic Metabolic Panel; Future  -     Comprehensive Metabolic Panel; Future  -     CBC and Auto Differential; Future  Vitamin D deficiency  -     Follow Up In Primary Care  -     Vitamin D 25-Hydroxy,Total (for eval of Vitamin D levels); Future  Physical debility  -     Follow Up In Primary Care  Prediabetes  -     Follow Up In Primary Care  -     Hemoglobin A1C; Future  Anxiety  -     Follow Up In Primary Care  Encounter for routine laboratory testing  Annual physical exam  -     Follow Up In Primary Care; Future    Patient's regular follow-up transitioned to [facility].     Current Outpatient Medications   Medication Instructions    atorvastatin (LIPITOR) 40 mg, oral, Daily    furosemide (LASIX) 20 mg, oral, Daily, Furosemide 60mg once/day    furosemide (LASIX) 40 mg, oral, Daily, Furosemide 60mg once/day    levothyroxine (SYNTHROID, LEVOXYL) 50 mcg, oral, Daily before breakfast    losartan (COZAAR) 50 mg, oral, Daily    melatonin 5 mg, Daily    multivitamin  "tablet 1 tablet, oral, Daily, One-A-Day Women's 50+ Complete Multivitamin    PARoxetine (PAXIL) 40 mg, oral, Daily    potassium chloride CR 10 mEq ER tablet 20 mEq, oral, Daily, Do not crush, chew, or split.     Subjective   - The patient otherwise feels well and denies any acute symptoms or concerns at this time.  - The patient denies any changes or progression of their chronic medical problems.  - The patient denies any problems or concerns with their medications.      Review of Systems   Constitutional: Negative.    Respiratory: Negative.     Cardiovascular: Negative.    Gastrointestinal: Negative.    All other systems reviewed and are negative.     Objective   There were no vitals filed for this visit.  There is no height or weight on file to calculate BMI.  Physical Exam  Vitals (Patient-reported vitals.) reviewed.   Constitutional:       Comments: This is a virtual / telehealth encounter; unable to perform physical exam.       Diagnostic Results   Lab Results   Component Value Date    GLUCOSE 95 05/13/2024    CALCIUM 9.2 05/13/2024     05/13/2024    K 3.5 05/13/2024    CO2 32 (H) 05/13/2024     05/13/2024    BUN 26 (H) 05/13/2024    CREATININE 0.80 05/13/2024     Lab Results   Component Value Date    ALT 31 04/11/2024    AST 41 (H) 04/11/2024    ALKPHOS 76 04/11/2024    BILITOT 0.6 04/11/2024     Lab Results   Component Value Date    WBC 7.1 05/13/2024    HGB 11.8 (L) 05/13/2024    HCT 36.6 05/13/2024    MCV 86 05/13/2024     05/13/2024     Lab Results   Component Value Date    CHOL 192 07/15/2023    CHOL 195 02/19/2022     Lab Results   Component Value Date    HDL 49 (L) 07/15/2023    HDL 59 02/19/2022     Lab Results   Component Value Date    LDLCALC 111 07/15/2023    LDLCALC 118 02/19/2022     Lab Results   Component Value Date    TRIG 162 (H) 07/15/2023    TRIG 92 02/19/2022     No components found for: \"CHOLHDL\"  Lab Results   Component Value Date    HGBA1C 5.9 07/15/2023     Other labs " not included in the list above were reviewed either before or during this encounter.    History   Past Medical History:   Diagnosis Date    Arthritis     left knee    CHF (congestive heart failure)     Depression      History reviewed. No pertinent surgical history.  No family history on file.  Social History     Socioeconomic History    Marital status:      Spouse name: Not on file    Number of children: Not on file    Years of education: Not on file    Highest education level: Not on file   Occupational History    Not on file   Tobacco Use    Smoking status: Never     Passive exposure: Never    Smokeless tobacco: Never   Vaping Use    Vaping status: Never Used   Substance and Sexual Activity    Alcohol use: Never    Drug use: Never    Sexual activity: Not on file   Other Topics Concern    Not on file   Social History Narrative    Not on file     Social Drivers of Health     Financial Resource Strain: Patient Unable To Answer (4/11/2024)    Overall Financial Resource Strain (CARDIA)     Difficulty of Paying Living Expenses: Patient unable to answer   Food Insecurity: No Food Insecurity (4/12/2024)    Hunger Vital Sign     Worried About Running Out of Food in the Last Year: Never true     Ran Out of Food in the Last Year: Never true   Transportation Needs: No Transportation Needs (4/11/2024)    PRAPARE - Transportation     Lack of Transportation (Medical): No     Lack of Transportation (Non-Medical): No   Physical Activity: Inactive (4/12/2024)    Exercise Vital Sign     Days of Exercise per Week: 0 days     Minutes of Exercise per Session: 0 min   Stress: No Stress Concern Present (4/12/2024)    [employer] - Occupational Stress Questionnaire     Feeling of Stress : Not at all   Social Connections: Unknown (4/12/2024)    Social Connection and Isolation Panel [NHANES]     Frequency of Communication with Friends and Family: More than three times a week     Frequency of Social  Gatherings with Friends and Family: More than three times a week     Attends Orthodox Services: Patient declined     Active Member of Clubs or Organizations: Patient declined     Attends Club or Organization Meetings: Never     Marital Status:    Intimate Partner Violence: Not At Risk (4/12/2024)    Humiliation, Afraid, Rape, and Kick questionnaire     Fear of Current or Ex-Partner: No     Emotionally Abused: No     Physically Abused: No     Sexually Abused: No   Housing Stability: Unknown (4/12/2024)    Housing Stability Vital Sign     Unable to Pay for Housing in the Last Year: Patient unable to answer     Number of Places Lived in the Last Year: 1     Unstable Housing in the Last Year: No     Allergies   Allergen Reactions    Ciprofloxacin Hives    Adhesive Rash     Current Outpatient Medications on File Prior to Visit   Medication Sig Dispense Refill    atorvastatin (Lipitor) 40 mg tablet Take 1 tablet (40 mg) by mouth once daily. 90 tablet 3    furosemide (Lasix) 20 mg tablet Take 1 tablet (20 mg) by mouth once daily. Furosemide 60mg once/day 90 tablet 3    furosemide (Lasix) 40 mg tablet Take 1 tablet (40 mg) by mouth once daily. Furosemide 60mg once/day 90 tablet 3    levothyroxine (Synthroid, Levoxyl) 50 mcg tablet TAKE 1 TABLET BY MOUTH ONCE DAILY IN THE MORNING BEFORE A MEAL 90 tablet 3    losartan (Cozaar) 50 mg tablet Take 1 tablet (50 mg) by mouth once daily. 90 tablet 3    melatonin 5 mg tablet Take 1 tablet (5 mg) by mouth once daily.      multivitamin tablet Take 1 tablet by mouth once daily. One-A-Day Women's 50+ Complete Multivitamin      PARoxetine (Paxil) 40 mg tablet Take 1 tablet (40 mg) by mouth once daily. 90 tablet 3    potassium chloride CR 10 mEq ER tablet Take 2 tablets (20 mEq) by mouth once daily. Do not crush, chew, or split. 180 tablet 3     No current facility-administered medications on file prior to visit.     Immunization History   Administered Date(s) Administered     Influenza, seasonal, injectable 01/16/2015     Patient's medical history was reviewed and updated either before or during this encounter.       Michael William MD

## 2025-02-17 DIAGNOSIS — E78.2 MIXED HYPERLIPIDEMIA: ICD-10-CM

## 2025-02-17 DIAGNOSIS — F32.A DEPRESSION, UNSPECIFIED DEPRESSION TYPE: ICD-10-CM

## 2025-02-17 DIAGNOSIS — F41.9 ANXIETY: ICD-10-CM

## 2025-02-17 DIAGNOSIS — I10 PRIMARY HYPERTENSION: ICD-10-CM

## 2025-02-17 DIAGNOSIS — E06.3 HYPOTHYROIDISM DUE TO HASHIMOTO THYROIDITIS: ICD-10-CM

## 2025-02-17 DIAGNOSIS — I87.2 VENOUS INSUFFICIENCY: ICD-10-CM

## 2025-02-17 RX ORDER — ATORVASTATIN CALCIUM 40 MG/1
40 TABLET, FILM COATED ORAL DAILY
Qty: 90 TABLET | Refills: 3 | Status: SHIPPED | OUTPATIENT
Start: 2025-02-17 | End: 2025-02-17 | Stop reason: SDUPTHER

## 2025-02-17 RX ORDER — PAROXETINE HYDROCHLORIDE 40 MG/1
40 TABLET, FILM COATED ORAL DAILY
Qty: 90 TABLET | Refills: 3 | Status: SHIPPED | OUTPATIENT
Start: 2025-02-17

## 2025-02-17 RX ORDER — POTASSIUM CHLORIDE 750 MG/1
10 TABLET, FILM COATED, EXTENDED RELEASE ORAL DAILY
Qty: 90 TABLET | Refills: 3 | Status: SHIPPED | OUTPATIENT
Start: 2025-02-17 | End: 2025-02-17 | Stop reason: SDUPTHER

## 2025-02-17 RX ORDER — LEVOTHYROXINE SODIUM 50 UG/1
50 TABLET ORAL DAILY
Qty: 90 TABLET | Refills: 3 | Status: SHIPPED | OUTPATIENT
Start: 2025-02-17

## 2025-02-17 RX ORDER — LOSARTAN POTASSIUM 50 MG/1
50 TABLET ORAL DAILY
Qty: 90 TABLET | Refills: 3 | Status: SHIPPED | OUTPATIENT
Start: 2025-02-17

## 2025-02-17 RX ORDER — FUROSEMIDE 40 MG/1
40 TABLET ORAL DAILY
Qty: 90 TABLET | Refills: 3 | Status: SHIPPED | OUTPATIENT
Start: 2025-02-17

## 2025-02-17 RX ORDER — LEVOTHYROXINE SODIUM 50 UG/1
50 TABLET ORAL DAILY
Qty: 90 TABLET | Refills: 3 | Status: SHIPPED | OUTPATIENT
Start: 2025-02-17 | End: 2025-02-17 | Stop reason: SDUPTHER

## 2025-02-17 RX ORDER — PAROXETINE HYDROCHLORIDE 40 MG/1
40 TABLET, FILM COATED ORAL DAILY
Qty: 90 TABLET | Refills: 3 | Status: SHIPPED | OUTPATIENT
Start: 2025-02-17 | End: 2025-02-17 | Stop reason: SDUPTHER

## 2025-02-17 RX ORDER — FUROSEMIDE 40 MG/1
40 TABLET ORAL DAILY
Qty: 90 TABLET | Refills: 3 | Status: SHIPPED | OUTPATIENT
Start: 2025-02-17 | End: 2025-02-17 | Stop reason: SDUPTHER

## 2025-02-17 RX ORDER — LOSARTAN POTASSIUM 50 MG/1
50 TABLET ORAL DAILY
Qty: 90 TABLET | Refills: 3 | Status: SHIPPED | OUTPATIENT
Start: 2025-02-17 | End: 2025-02-17 | Stop reason: SDUPTHER

## 2025-02-17 RX ORDER — ATORVASTATIN CALCIUM 40 MG/1
40 TABLET, FILM COATED ORAL DAILY
Qty: 90 TABLET | Refills: 3 | Status: SHIPPED | OUTPATIENT
Start: 2025-02-17

## 2025-02-17 RX ORDER — POTASSIUM CHLORIDE 750 MG/1
10 TABLET, FILM COATED, EXTENDED RELEASE ORAL DAILY
Qty: 90 TABLET | Refills: 3 | Status: SHIPPED | OUTPATIENT
Start: 2025-02-17

## 2025-02-17 NOTE — TELEPHONE ENCOUNTER
Refills requested to [facility]:    Atorvastatin  Furosemide  Levothyroxine  Losartan  Paroxetine  klorcon

## 2025-05-07 DIAGNOSIS — F32.A DEPRESSION, UNSPECIFIED DEPRESSION TYPE: ICD-10-CM

## 2025-05-07 DIAGNOSIS — E06.3 HYPOTHYROIDISM DUE TO HASHIMOTO THYROIDITIS: ICD-10-CM

## 2025-05-07 DIAGNOSIS — I87.2 VENOUS INSUFFICIENCY: ICD-10-CM

## 2025-05-07 DIAGNOSIS — I10 PRIMARY HYPERTENSION: ICD-10-CM

## 2025-05-07 DIAGNOSIS — E78.2 MIXED HYPERLIPIDEMIA: ICD-10-CM

## 2025-05-07 DIAGNOSIS — F41.9 ANXIETY: ICD-10-CM

## 2025-05-07 RX ORDER — LOSARTAN POTASSIUM 50 MG/1
50 TABLET ORAL DAILY
Qty: 90 TABLET | Refills: 3 | Status: SHIPPED | OUTPATIENT
Start: 2025-05-07

## 2025-05-07 RX ORDER — FUROSEMIDE 40 MG/1
40 TABLET ORAL DAILY
Qty: 90 TABLET | Refills: 3 | Status: SHIPPED | OUTPATIENT
Start: 2025-05-07

## 2025-05-07 RX ORDER — LEVOTHYROXINE SODIUM 50 UG/1
50 TABLET ORAL DAILY
Qty: 90 TABLET | Refills: 3 | Status: SHIPPED | OUTPATIENT
Start: 2025-05-07

## 2025-05-07 RX ORDER — ATORVASTATIN CALCIUM 40 MG/1
40 TABLET, FILM COATED ORAL DAILY
Qty: 90 TABLET | Refills: 3 | Status: SHIPPED | OUTPATIENT
Start: 2025-05-07

## 2025-05-07 RX ORDER — POTASSIUM CHLORIDE 750 MG/1
10 TABLET, FILM COATED, EXTENDED RELEASE ORAL DAILY
Qty: 90 TABLET | Refills: 3 | Status: SHIPPED | OUTPATIENT
Start: 2025-05-07

## 2025-05-07 RX ORDER — PAROXETINE HYDROCHLORIDE 40 MG/1
40 TABLET, FILM COATED ORAL DAILY
Qty: 90 TABLET | Refills: 3 | Status: SHIPPED | OUTPATIENT
Start: 2025-05-07

## 2025-07-06 ENCOUNTER — APPOINTMENT (OUTPATIENT)
Dept: RADIOLOGY | Facility: HOSPITAL | Age: 80
End: 2025-07-06
Payer: MEDICARE

## 2025-07-06 ENCOUNTER — HOSPITAL ENCOUNTER (OUTPATIENT)
Facility: HOSPITAL | Age: 80
Setting detail: OBSERVATION
End: 2025-07-06
Admitting: INTERNAL MEDICINE
Payer: MEDICARE

## 2025-07-06 ENCOUNTER — APPOINTMENT (OUTPATIENT)
Dept: CARDIOLOGY | Facility: HOSPITAL | Age: 80
End: 2025-07-06
Payer: MEDICARE

## 2025-07-06 VITALS
HEART RATE: 77 BPM | OXYGEN SATURATION: 92 % | DIASTOLIC BLOOD PRESSURE: 85 MMHG | RESPIRATION RATE: 16 BRPM | BODY MASS INDEX: 33.96 KG/M2 | TEMPERATURE: 97.6 F | HEIGHT: 60 IN | WEIGHT: 173 LBS | SYSTOLIC BLOOD PRESSURE: 124 MMHG

## 2025-07-06 DIAGNOSIS — I50.43 CHF (CONGESTIVE HEART FAILURE), NYHA CLASS I, ACUTE ON CHRONIC, COMBINED: ICD-10-CM

## 2025-07-06 DIAGNOSIS — G89.29 CHRONIC LOW BACK PAIN, UNSPECIFIED BACK PAIN LATERALITY, UNSPECIFIED WHETHER SCIATICA PRESENT: ICD-10-CM

## 2025-07-06 DIAGNOSIS — R60.1 GENERALIZED EDEMA: ICD-10-CM

## 2025-07-06 DIAGNOSIS — R62.7 FAILURE TO THRIVE IN ADULT: ICD-10-CM

## 2025-07-06 DIAGNOSIS — R60.9 EDEMA, UNSPECIFIED TYPE: Primary | ICD-10-CM

## 2025-07-06 DIAGNOSIS — B37.2 CANDIDIASIS OF SKIN: ICD-10-CM

## 2025-07-06 DIAGNOSIS — M54.50 CHRONIC LOW BACK PAIN, UNSPECIFIED BACK PAIN LATERALITY, UNSPECIFIED WHETHER SCIATICA PRESENT: ICD-10-CM

## 2025-07-06 PROBLEM — R53.1 WEAKNESS: Status: ACTIVE | Noted: 2025-07-06

## 2025-07-06 LAB
ALBUMIN SERPL BCP-MCNC: 3.7 G/DL (ref 3.4–5)
ALP SERPL-CCNC: 67 U/L (ref 33–136)
ALT SERPL W P-5'-P-CCNC: 17 U/L (ref 7–45)
ANION GAP SERPL CALCULATED.3IONS-SCNC: 11 MMOL/L (ref 10–20)
ANION GAP SERPL CALCULATED.3IONS-SCNC: 12 MMOL/L (ref 10–20)
APPEARANCE UR: CLEAR
AST SERPL W P-5'-P-CCNC: 25 U/L (ref 9–39)
BASOPHILS # BLD AUTO: 0.1 X10*3/UL (ref 0–0.1)
BASOPHILS NFR BLD AUTO: 1 %
BILIRUB SERPL-MCNC: 0.8 MG/DL (ref 0–1.2)
BILIRUB UR STRIP.AUTO-MCNC: NEGATIVE MG/DL
BNP SERPL-MCNC: 24 PG/ML (ref 0–99)
BUN SERPL-MCNC: 20 MG/DL (ref 6–23)
BUN SERPL-MCNC: 21 MG/DL (ref 6–23)
CALCIUM SERPL-MCNC: 8.8 MG/DL (ref 8.6–10.3)
CALCIUM SERPL-MCNC: 8.9 MG/DL (ref 8.6–10.3)
CARDIAC TROPONIN I PNL SERPL HS: 7 NG/L (ref 0–13)
CHLORIDE SERPL-SCNC: 99 MMOL/L (ref 98–107)
CHLORIDE SERPL-SCNC: 99 MMOL/L (ref 98–107)
CO2 SERPL-SCNC: 30 MMOL/L (ref 21–32)
CO2 SERPL-SCNC: 31 MMOL/L (ref 21–32)
COLOR UR: NORMAL
CREAT SERPL-MCNC: 0.7 MG/DL (ref 0.5–1.05)
CREAT SERPL-MCNC: 0.72 MG/DL (ref 0.5–1.05)
EGFRCR SERPLBLD CKD-EPI 2021: 84 ML/MIN/1.73M*2
EGFRCR SERPLBLD CKD-EPI 2021: 86 ML/MIN/1.73M*2
EOSINOPHIL # BLD AUTO: 0.14 X10*3/UL (ref 0–0.4)
EOSINOPHIL NFR BLD AUTO: 1.5 %
ERYTHROCYTE [DISTWIDTH] IN BLOOD BY AUTOMATED COUNT: 13.3 % (ref 11.5–14.5)
FLUAV RNA RESP QL NAA+PROBE: NOT DETECTED
FLUBV RNA RESP QL NAA+PROBE: NOT DETECTED
GLUCOSE SERPL-MCNC: 111 MG/DL (ref 74–99)
GLUCOSE SERPL-MCNC: 116 MG/DL (ref 74–99)
GLUCOSE UR STRIP.AUTO-MCNC: NORMAL MG/DL
HCT VFR BLD AUTO: 33.9 % (ref 36–46)
HGB BLD-MCNC: 11.2 G/DL (ref 12–16)
IMM GRANULOCYTES # BLD AUTO: 0.04 X10*3/UL (ref 0–0.5)
IMM GRANULOCYTES NFR BLD AUTO: 0.4 % (ref 0–0.9)
KETONES UR STRIP.AUTO-MCNC: NEGATIVE MG/DL
LEUKOCYTE ESTERASE UR QL STRIP.AUTO: NEGATIVE
LYMPHOCYTES # BLD AUTO: 1.91 X10*3/UL (ref 0.8–3)
LYMPHOCYTES NFR BLD AUTO: 19.8 %
MAGNESIUM SERPL-MCNC: 1.92 MG/DL (ref 1.6–2.4)
MCH RBC QN AUTO: 28.9 PG (ref 26–34)
MCHC RBC AUTO-ENTMCNC: 33 G/DL (ref 32–36)
MCV RBC AUTO: 87 FL (ref 80–100)
MONOCYTES # BLD AUTO: 0.88 X10*3/UL (ref 0.05–0.8)
MONOCYTES NFR BLD AUTO: 9.1 %
NEUTROPHILS # BLD AUTO: 6.56 X10*3/UL (ref 1.6–5.5)
NEUTROPHILS NFR BLD AUTO: 68.2 %
NITRITE UR QL STRIP.AUTO: NEGATIVE
NRBC BLD-RTO: 0 /100 WBCS (ref 0–0)
PH UR STRIP.AUTO: 7 [PH]
PLATELET # BLD AUTO: 278 X10*3/UL (ref 150–450)
POTASSIUM SERPL-SCNC: 3.2 MMOL/L (ref 3.5–5.3)
POTASSIUM SERPL-SCNC: 3.2 MMOL/L (ref 3.5–5.3)
PROT SERPL-MCNC: 6.8 G/DL (ref 6.4–8.2)
PROT UR STRIP.AUTO-MCNC: NEGATIVE MG/DL
RBC # BLD AUTO: 3.88 X10*6/UL (ref 4–5.2)
RBC # UR STRIP.AUTO: NEGATIVE MG/DL
RSV RNA RESP QL NAA+PROBE: NOT DETECTED
SARS-COV-2 RNA RESP QL NAA+PROBE: NOT DETECTED
SODIUM SERPL-SCNC: 138 MMOL/L (ref 136–145)
SODIUM SERPL-SCNC: 138 MMOL/L (ref 136–145)
SP GR UR STRIP.AUTO: 1.01
UROBILINOGEN UR STRIP.AUTO-MCNC: NORMAL MG/DL
WBC # BLD AUTO: 9.6 X10*3/UL (ref 4.4–11.3)

## 2025-07-06 PROCEDURE — 2500000001 HC RX 250 WO HCPCS SELF ADMINISTERED DRUGS (ALT 637 FOR MEDICARE OP): Performed by: NURSE PRACTITIONER

## 2025-07-06 PROCEDURE — 2500000004 HC RX 250 GENERAL PHARMACY W/ HCPCS (ALT 636 FOR OP/ED): Performed by: NURSE PRACTITIONER

## 2025-07-06 PROCEDURE — 99285 EMERGENCY DEPT VISIT HI MDM: CPT | Mod: 25

## 2025-07-06 PROCEDURE — 80051 ELECTROLYTE PANEL: CPT | Performed by: NURSE PRACTITIONER

## 2025-07-06 PROCEDURE — 87637 SARSCOV2&INF A&B&RSV AMP PRB: CPT

## 2025-07-06 PROCEDURE — 71045 X-RAY EXAM CHEST 1 VIEW: CPT

## 2025-07-06 PROCEDURE — 96372 THER/PROPH/DIAG INJ SC/IM: CPT | Performed by: NURSE PRACTITIONER

## 2025-07-06 PROCEDURE — 83735 ASSAY OF MAGNESIUM: CPT

## 2025-07-06 PROCEDURE — 80048 BASIC METABOLIC PNL TOTAL CA: CPT | Mod: CCI | Performed by: NURSE PRACTITIONER

## 2025-07-06 PROCEDURE — 80053 COMPREHEN METABOLIC PANEL: CPT

## 2025-07-06 PROCEDURE — 71045 X-RAY EXAM CHEST 1 VIEW: CPT | Performed by: RADIOLOGY

## 2025-07-06 PROCEDURE — 85025 COMPLETE CBC W/AUTO DIFF WBC: CPT

## 2025-07-06 PROCEDURE — 99223 1ST HOSP IP/OBS HIGH 75: CPT | Performed by: NURSE PRACTITIONER

## 2025-07-06 PROCEDURE — G0378 HOSPITAL OBSERVATION PER HR: HCPCS

## 2025-07-06 PROCEDURE — 36415 COLL VENOUS BLD VENIPUNCTURE: CPT

## 2025-07-06 PROCEDURE — 93005 ELECTROCARDIOGRAM TRACING: CPT

## 2025-07-06 PROCEDURE — 84484 ASSAY OF TROPONIN QUANT: CPT

## 2025-07-06 PROCEDURE — 81003 URINALYSIS AUTO W/O SCOPE: CPT

## 2025-07-06 PROCEDURE — 93010 ELECTROCARDIOGRAM REPORT: CPT | Performed by: INTERNAL MEDICINE

## 2025-07-06 PROCEDURE — 83880 ASSAY OF NATRIURETIC PEPTIDE: CPT

## 2025-07-06 RX ORDER — ATORVASTATIN CALCIUM 40 MG/1
40 TABLET, FILM COATED ORAL DAILY
Status: ACTIVE | OUTPATIENT
Start: 2025-07-07

## 2025-07-06 RX ORDER — FUROSEMIDE 40 MG/1
40 TABLET ORAL DAILY
Status: ACTIVE | OUTPATIENT
Start: 2025-07-07

## 2025-07-06 RX ORDER — HYDROXYZINE HYDROCHLORIDE 25 MG/1
25 TABLET, FILM COATED ORAL EVERY 6 HOURS PRN
Status: ACTIVE | OUTPATIENT
Start: 2025-07-06

## 2025-07-06 RX ORDER — PAROXETINE 20 MG/1
40 TABLET, FILM COATED ORAL DAILY
Status: DISPENSED | OUTPATIENT
Start: 2025-07-07

## 2025-07-06 RX ORDER — HEPARIN SODIUM 5000 [USP'U]/ML
5000 INJECTION, SOLUTION INTRAVENOUS; SUBCUTANEOUS EVERY 12 HOURS
Status: DISPENSED | OUTPATIENT
Start: 2025-07-06

## 2025-07-06 RX ORDER — LEVOTHYROXINE SODIUM 50 UG/1
50 TABLET ORAL DAILY
Status: ACTIVE | OUTPATIENT
Start: 2025-07-07

## 2025-07-06 RX ORDER — POTASSIUM CHLORIDE 1.5 G/1.58G
40 POWDER, FOR SOLUTION ORAL ONCE
Status: COMPLETED | OUTPATIENT
Start: 2025-07-06 | End: 2025-07-06

## 2025-07-06 RX ORDER — NYSTATIN 100000 [USP'U]/G
1 POWDER TOPICAL 2 TIMES DAILY
Status: DISPENSED | OUTPATIENT
Start: 2025-07-06

## 2025-07-06 RX ORDER — POTASSIUM CHLORIDE 750 MG/1
10 TABLET, FILM COATED, EXTENDED RELEASE ORAL DAILY
Status: ACTIVE | OUTPATIENT
Start: 2025-07-07

## 2025-07-06 RX ORDER — LOSARTAN POTASSIUM 50 MG/1
50 TABLET ORAL DAILY
Status: ACTIVE | OUTPATIENT
Start: 2025-07-07

## 2025-07-06 RX ADMIN — NYSTATIN 1 APPLICATION: 100000 POWDER TOPICAL at 22:01

## 2025-07-06 RX ADMIN — HEPARIN SODIUM 5000 UNITS: 5000 INJECTION, SOLUTION INTRAVENOUS; SUBCUTANEOUS at 19:16

## 2025-07-06 RX ADMIN — POTASSIUM CHLORIDE 40 MEQ: 1.5 POWDER, FOR SOLUTION ORAL at 19:16

## 2025-07-06 SDOH — HEALTH STABILITY: MENTAL HEALTH: HOW OFTEN DO YOU HAVE A DRINK CONTAINING ALCOHOL?: NEVER

## 2025-07-06 SDOH — ECONOMIC STABILITY: TRANSPORTATION INSECURITY: IN THE PAST 12 MONTHS, HAS LACK OF TRANSPORTATION KEPT YOU FROM MEDICAL APPOINTMENTS OR FROM GETTING MEDICATIONS?: NO

## 2025-07-06 SDOH — HEALTH STABILITY: MENTAL HEALTH: HOW OFTEN DO YOU HAVE SIX OR MORE DRINKS ON ONE OCCASION?: NEVER

## 2025-07-06 SDOH — SOCIAL STABILITY: SOCIAL INSECURITY: DOES ANYONE TRY TO KEEP YOU FROM HAVING/CONTACTING OTHER FRIENDS OR DOING THINGS OUTSIDE YOUR HOME?: NO

## 2025-07-06 SDOH — ECONOMIC STABILITY: FOOD INSECURITY: WITHIN THE PAST 12 MONTHS, THE FOOD YOU BOUGHT JUST DIDN'T LAST AND YOU DIDN'T HAVE MONEY TO GET MORE.: NEVER TRUE

## 2025-07-06 SDOH — ECONOMIC STABILITY: INCOME INSECURITY: IN THE PAST 12 MONTHS HAS THE ELECTRIC, GAS, OIL, OR WATER COMPANY THREATENED TO SHUT OFF SERVICES IN YOUR HOME?: NO

## 2025-07-06 SDOH — ECONOMIC STABILITY: FOOD INSECURITY: HOW HARD IS IT FOR YOU TO PAY FOR THE VERY BASICS LIKE FOOD, HOUSING, MEDICAL CARE, AND HEATING?: NOT HARD AT ALL

## 2025-07-06 SDOH — ECONOMIC STABILITY: HOUSING INSECURITY: IN THE LAST 12 MONTHS, WAS THERE A TIME WHEN YOU WERE NOT ABLE TO PAY THE MORTGAGE OR RENT ON TIME?: NO

## 2025-07-06 SDOH — SOCIAL STABILITY: SOCIAL INSECURITY: WITHIN THE LAST YEAR, HAVE YOU BEEN HUMILIATED OR EMOTIONALLY ABUSED IN OTHER WAYS BY YOUR PARTNER OR EX-PARTNER?: NO

## 2025-07-06 SDOH — HEALTH STABILITY: MENTAL HEALTH: HOW MANY DRINKS CONTAINING ALCOHOL DO YOU HAVE ON A TYPICAL DAY WHEN YOU ARE DRINKING?: PATIENT DOES NOT DRINK

## 2025-07-06 SDOH — ECONOMIC STABILITY: HOUSING INSECURITY: AT ANY TIME IN THE PAST 12 MONTHS, WERE YOU HOMELESS OR LIVING IN A SHELTER (INCLUDING NOW)?: NO

## 2025-07-06 SDOH — SOCIAL STABILITY: SOCIAL INSECURITY: WITHIN THE LAST YEAR, HAVE YOU BEEN AFRAID OF YOUR PARTNER OR EX-PARTNER?: NO

## 2025-07-06 SDOH — SOCIAL STABILITY: SOCIAL INSECURITY: DO YOU FEEL ANYONE HAS EXPLOITED OR TAKEN ADVANTAGE OF YOU FINANCIALLY OR OF YOUR PERSONAL PROPERTY?: NO

## 2025-07-06 SDOH — SOCIAL STABILITY: SOCIAL INSECURITY: ABUSE: ADULT

## 2025-07-06 SDOH — SOCIAL STABILITY: SOCIAL INSECURITY
WITHIN THE LAST YEAR, HAVE YOU BEEN RAPED OR FORCED TO HAVE ANY KIND OF SEXUAL ACTIVITY BY YOUR PARTNER OR EX-PARTNER?: NO

## 2025-07-06 SDOH — SOCIAL STABILITY: SOCIAL INSECURITY: HAS ANYONE EVER THREATENED TO HURT YOUR FAMILY OR YOUR PETS?: NO

## 2025-07-06 SDOH — ECONOMIC STABILITY: FOOD INSECURITY: WITHIN THE PAST 12 MONTHS, YOU WORRIED THAT YOUR FOOD WOULD RUN OUT BEFORE YOU GOT THE MONEY TO BUY MORE.: NEVER TRUE

## 2025-07-06 SDOH — ECONOMIC STABILITY: HOUSING INSECURITY: IN THE PAST 12 MONTHS, HOW MANY TIMES HAVE YOU MOVED WHERE YOU WERE LIVING?: 0

## 2025-07-06 SDOH — SOCIAL STABILITY: SOCIAL INSECURITY: ARE YOU OR HAVE YOU BEEN THREATENED OR ABUSED PHYSICALLY, EMOTIONALLY, OR SEXUALLY BY ANYONE?: NO

## 2025-07-06 SDOH — SOCIAL STABILITY: SOCIAL INSECURITY: WERE YOU ABLE TO COMPLETE ALL THE BEHAVIORAL HEALTH SCREENINGS?: YES

## 2025-07-06 SDOH — SOCIAL STABILITY: SOCIAL INSECURITY: HAVE YOU HAD ANY THOUGHTS OF HARMING ANYONE ELSE?: NO

## 2025-07-06 SDOH — SOCIAL STABILITY: SOCIAL INSECURITY: ARE THERE ANY APPARENT SIGNS OF INJURIES/BEHAVIORS THAT COULD BE RELATED TO ABUSE/NEGLECT?: NO

## 2025-07-06 SDOH — SOCIAL STABILITY: SOCIAL INSECURITY: HAVE YOU HAD THOUGHTS OF HARMING ANYONE ELSE?: NO

## 2025-07-06 SDOH — SOCIAL STABILITY: SOCIAL INSECURITY: DO YOU FEEL UNSAFE GOING BACK TO THE PLACE WHERE YOU ARE LIVING?: NO

## 2025-07-06 ASSESSMENT — ENCOUNTER SYMPTOMS
RESPIRATORY NEGATIVE: 1
ALLERGIC/IMMUNOLOGIC NEGATIVE: 1
DIAPHORESIS: 1
HEMATOLOGIC/LYMPHATIC NEGATIVE: 1
PSYCHIATRIC NEGATIVE: 1
APPETITE CHANGE: 1
ENDOCRINE NEGATIVE: 1
ACTIVITY CHANGE: 1
MUSCULOSKELETAL NEGATIVE: 1
GASTROINTESTINAL NEGATIVE: 1
WEAKNESS: 1
EYES NEGATIVE: 1

## 2025-07-06 ASSESSMENT — COGNITIVE AND FUNCTIONAL STATUS - GENERAL
DAILY ACTIVITIY SCORE: 19
DRESSING REGULAR UPPER BODY CLOTHING: A LITTLE
TURNING FROM BACK TO SIDE WHILE IN FLAT BAD: A LITTLE
MOBILITY SCORE: 18
DRESSING REGULAR LOWER BODY CLOTHING: A LITTLE
PERSONAL GROOMING: A LITTLE
MOVING TO AND FROM BED TO CHAIR: A LITTLE
HELP NEEDED FOR BATHING: A LITTLE
HELP NEEDED FOR BATHING: A LITTLE
MOBILITY SCORE: 18
DRESSING REGULAR UPPER BODY CLOTHING: A LITTLE
PATIENT BASELINE BEDBOUND: NO
MOVING TO AND FROM BED TO CHAIR: A LITTLE
MOVING FROM LYING ON BACK TO SITTING ON SIDE OF FLAT BED WITH BEDRAILS: A LITTLE
DRESSING REGULAR LOWER BODY CLOTHING: A LITTLE
WALKING IN HOSPITAL ROOM: A LITTLE
STANDING UP FROM CHAIR USING ARMS: A LITTLE
PERSONAL GROOMING: A LITTLE
STANDING UP FROM CHAIR USING ARMS: A LITTLE
TURNING FROM BACK TO SIDE WHILE IN FLAT BAD: A LITTLE
TOILETING: A LITTLE
DAILY ACTIVITIY SCORE: 19
CLIMB 3 TO 5 STEPS WITH RAILING: A LITTLE
CLIMB 3 TO 5 STEPS WITH RAILING: A LITTLE
MOVING FROM LYING ON BACK TO SITTING ON SIDE OF FLAT BED WITH BEDRAILS: A LITTLE
TOILETING: A LITTLE
WALKING IN HOSPITAL ROOM: A LITTLE

## 2025-07-06 ASSESSMENT — LIFESTYLE VARIABLES
SKIP TO QUESTIONS 9-10: 1
AUDIT-C TOTAL SCORE: 0
HOW MANY STANDARD DRINKS CONTAINING ALCOHOL DO YOU HAVE ON A TYPICAL DAY: PATIENT DOES NOT DRINK
HOW OFTEN DO YOU HAVE 6 OR MORE DRINKS ON ONE OCCASION: NEVER
SKIP TO QUESTIONS 9-10: 1
HOW OFTEN DO YOU HAVE A DRINK CONTAINING ALCOHOL: NEVER

## 2025-07-06 ASSESSMENT — ACTIVITIES OF DAILY LIVING (ADL)
LACK_OF_TRANSPORTATION: NO
JUDGMENT_ADEQUATE_SAFELY_COMPLETE_DAILY_ACTIVITIES: YES
HEARING - LEFT EAR: FUNCTIONAL
ASSISTIVE_DEVICE: WALKER
PATIENT'S MEMORY ADEQUATE TO SAFELY COMPLETE DAILY ACTIVITIES?: YES
BATHING: NEEDS ASSISTANCE
GROOMING: NEEDS ASSISTANCE
WALKS IN HOME: NEEDS ASSISTANCE
FEEDING YOURSELF: INDEPENDENT
TOILETING: NEEDS ASSISTANCE
LACK_OF_TRANSPORTATION: NO
ADEQUATE_TO_COMPLETE_ADL: YES
HEARING - RIGHT EAR: FUNCTIONAL
DRESSING YOURSELF: NEEDS ASSISTANCE

## 2025-07-06 ASSESSMENT — PATIENT HEALTH QUESTIONNAIRE - PHQ9
1. LITTLE INTEREST OR PLEASURE IN DOING THINGS: NOT AT ALL
2. FEELING DOWN, DEPRESSED OR HOPELESS: NOT AT ALL
SUM OF ALL RESPONSES TO PHQ9 QUESTIONS 1 & 2: 0

## 2025-07-06 ASSESSMENT — PAIN - FUNCTIONAL ASSESSMENT
PAIN_FUNCTIONAL_ASSESSMENT: 0-10
PAIN_FUNCTIONAL_ASSESSMENT: WONG-BAKER FACES
PAIN_FUNCTIONAL_ASSESSMENT: 0-10

## 2025-07-06 ASSESSMENT — PAIN SCALES - WONG BAKER: WONGBAKER_NUMERICALRESPONSE: NO HURT

## 2025-07-06 ASSESSMENT — PAIN DESCRIPTION - PROGRESSION: CLINICAL_PROGRESSION: NOT CHANGED

## 2025-07-06 ASSESSMENT — PAIN SCALES - GENERAL: PAINLEVEL_OUTOF10: 0 - NO PAIN

## 2025-07-06 NOTE — H&P
History Of Present Illness  Carmen Cao is a 80 y.o. female, with CHF, Hypertension, and cognitive impairment, who presents to the ED for evaluation of increasing weakness over the last few days. Pt lives with her  and normally ambulates with a walker. Daughter is an NP and is very involved. She reports pt has had increasing lower extremity edema over the last several days and she has given pt extra doses of Lasix. Edema is now improved to baseline. Labs were drawn in ED and CBC is unremarkable. CMP is pending. UA is negative. CXR is pending. The patient is A&Ox2-3. She is on room air and denies dyspnea. No fever, chills, or recent illness.        Past Medical History  Medical History[1]    Surgical History  Surgical History[2]     Social History  She reports that she has never smoked. She has never been exposed to tobacco smoke. She has never used smokeless tobacco. She reports that she does not drink alcohol and does not use drugs.    Family History  Family History[3]     Allergies  Ciprofloxacin and Adhesive    Review of Systems   Constitutional:  Positive for activity change, appetite change and diaphoresis.   HENT: Negative.     Eyes: Negative.    Respiratory: Negative.     Cardiovascular:  Positive for leg swelling.   Gastrointestinal: Negative.    Endocrine: Negative.    Genitourinary: Negative.    Musculoskeletal: Negative.    Skin: Negative.    Allergic/Immunologic: Negative.    Neurological:  Positive for weakness.   Hematological: Negative.    Psychiatric/Behavioral: Negative.          Physical Exam  Constitutional:       Appearance: Normal appearance.   HENT:      Head: Normocephalic and atraumatic.      Nose: Nose normal.      Mouth/Throat:      Mouth: Mucous membranes are dry.      Pharynx: Oropharynx is clear.   Eyes:      Extraocular Movements: Extraocular movements intact.      Pupils: Pupils are equal, round, and reactive to light.   Cardiovascular:      Rate and Rhythm: Normal rate and  regular rhythm.      Pulses: Normal pulses.   Pulmonary:      Effort: Pulmonary effort is normal.      Breath sounds: Normal breath sounds.   Abdominal:      General: Abdomen is flat. Bowel sounds are normal.      Palpations: Abdomen is soft.   Musculoskeletal:         General: Normal range of motion.      Cervical back: Normal range of motion and neck supple.      Comments: Generalized weakness   Skin:     General: Skin is warm and dry.      Capillary Refill: Capillary refill takes less than 2 seconds.      Coloration: Skin is pale.   Neurological:      General: No focal deficit present.      Comments: A&O x 2-3   Psychiatric:         Mood and Affect: Mood normal.          Last Recorded Vitals  Blood pressure 124/85, pulse 77, temperature 36.4 °C (97.6 °F), temperature source Tympanic, resp. rate 16, height (!) 1.524 m (5'), weight 78.5 kg (173 lb), SpO2 (!) 92%.    Relevant Results  No results found.    Results for orders placed or performed during the hospital encounter of 07/06/25 (from the past 24 hours)   CBC and Auto Differential   Result Value Ref Range    WBC 9.6 4.4 - 11.3 x10*3/uL    nRBC 0.0 0.0 - 0.0 /100 WBCs    RBC 3.88 (L) 4.00 - 5.20 x10*6/uL    Hemoglobin 11.2 (L) 12.0 - 16.0 g/dL    Hematocrit 33.9 (L) 36.0 - 46.0 %    MCV 87 80 - 100 fL    MCH 28.9 26.0 - 34.0 pg    MCHC 33.0 32.0 - 36.0 g/dL    RDW 13.3 11.5 - 14.5 %    Platelets 278 150 - 450 x10*3/uL    Neutrophils % 68.2 40.0 - 80.0 %    Immature Granulocytes %, Automated 0.4 0.0 - 0.9 %    Lymphocytes % 19.8 13.0 - 44.0 %    Monocytes % 9.1 2.0 - 10.0 %    Eosinophils % 1.5 0.0 - 6.0 %    Basophils % 1.0 0.0 - 2.0 %    Neutrophils Absolute 6.56 (H) 1.60 - 5.50 x10*3/uL    Immature Granulocytes Absolute, Automated 0.04 0.00 - 0.50 x10*3/uL    Lymphocytes Absolute 1.91 0.80 - 3.00 x10*3/uL    Monocytes Absolute 0.88 (H) 0.05 - 0.80 x10*3/uL    Eosinophils Absolute 0.14 0.00 - 0.40 x10*3/uL    Basophils Absolute 0.10 0.00 - 0.10 x10*3/uL    Troponin I, High Sensitivity   Result Value Ref Range    Troponin I, High Sensitivity 7 0 - 13 ng/L   B-Type Natriuretic Peptide   Result Value Ref Range    BNP 24 0 - 99 pg/mL   Sars-CoV-2, Influenza A/B and RSV PCR   Result Value Ref Range    Coronavirus 2019, PCR Not Detected Not Detected    Flu A Result Not Detected Not Detected    Flu B Result Not Detected Not Detected    RSV PCR Not Detected Not Detected   Urinalysis with Reflex Culture and Microscopic   Result Value Ref Range    Color, Urine Light-Yellow Light-Yellow, Yellow, Dark-Yellow    Appearance, Urine Clear Clear    Specific Gravity, Urine 1.009 1.005 - 1.035    pH, Urine 7.0 5.0, 5.5, 6.0, 6.5, 7.0, 7.5, 8.0    Protein, Urine NEGATIVE NEGATIVE, 10 (TRACE), 20 (TRACE) mg/dL    Glucose, Urine Normal Normal mg/dL    Blood, Urine NEGATIVE NEGATIVE mg/dL    Ketones, Urine NEGATIVE NEGATIVE mg/dL    Bilirubin, Urine NEGATIVE NEGATIVE mg/dL    Urobilinogen, Urine Normal Normal mg/dL    Nitrite, Urine NEGATIVE NEGATIVE    Leukocyte Esterase, Urine NEGATIVE NEGATIVE     Assessment and Plan    Asthenia  -PT/OT. Daughter requesting [facility]    Hypertension  -Continue Losartan  -BP stable, monitor    Edema  -Has been taking extra doses of Lasix. Now at baseline per daughter   -Continue Lasix 40 mg daily  -CXR pending  -ECHO  -Monitor     Anxiety/Depression  -Continue Paxil  -Hydroxyzine prn per daughter request     DVT Prophylaxis   -Heparin subcutaneous    Plan  Plan d/w patient and daughter and they are agreeable. Advance directives discussed and pt is a full code.         Mary Alice Killian, APRN-CNP         [1]   Past Medical History:  Diagnosis Date    Arthritis     left knee    CHF (congestive heart failure)     Depression    [2] History reviewed. No pertinent surgical history.  [3] No family history on file.

## 2025-07-07 ENCOUNTER — APPOINTMENT (OUTPATIENT)
Dept: CARDIOLOGY | Facility: HOSPITAL | Age: 80
End: 2025-07-07
Payer: MEDICARE

## 2025-07-07 LAB
ANION GAP SERPL CALCULATED.3IONS-SCNC: 12 MMOL/L (ref 10–20)
AORTIC VALVE MEAN GRADIENT: 5 MMHG
AORTIC VALVE PEAK VELOCITY: 1.56 M/S
ATRIAL RATE: 76 BPM
AV PEAK GRADIENT: 10 MMHG
AVA (PEAK VEL): 2.09 CM2
AVA (VTI): 2.22 CM2
BUN SERPL-MCNC: 13 MG/DL (ref 6–23)
CALCIUM SERPL-MCNC: 8.6 MG/DL (ref 8.6–10.3)
CHLORIDE SERPL-SCNC: 101 MMOL/L (ref 98–107)
CO2 SERPL-SCNC: 29 MMOL/L (ref 21–32)
CREAT SERPL-MCNC: 0.59 MG/DL (ref 0.5–1.05)
EGFRCR SERPLBLD CKD-EPI 2021: 90 ML/MIN/1.73M*2
EJECTION FRACTION APICAL 4 CHAMBER: 63.5
EJECTION FRACTION: 68 %
GLUCOSE SERPL-MCNC: 99 MG/DL (ref 74–99)
LEFT ATRIUM VOLUME AREA LENGTH INDEX BSA: 12.5 ML/M2
LEFT VENTRICLE INTERNAL DIMENSION DIASTOLE: 4 CM (ref 3.5–6)
LEFT VENTRICULAR OUTFLOW TRACT DIAMETER: 2 CM
LV EJECTION FRACTION BIPLANE: 62 %
MITRAL VALVE E/A RATIO: 0.74
P AXIS: 53 DEGREES
P OFFSET: 202 MS
P ONSET: 147 MS
POTASSIUM SERPL-SCNC: 3 MMOL/L (ref 3.5–5.3)
PR INTERVAL: 154 MS
Q ONSET: 224 MS
QRS COUNT: 12 BEATS
QRS DURATION: 92 MS
QT INTERVAL: 412 MS
QTC CALCULATION(BAZETT): 463 MS
QTC FREDERICIA: 445 MS
R AXIS: -5 DEGREES
RIGHT VENTRICLE FREE WALL PEAK S': 16.9 CM/S
RIGHT VENTRICLE PEAK SYSTOLIC PRESSURE: 32 MMHG
SODIUM SERPL-SCNC: 139 MMOL/L (ref 136–145)
T AXIS: 22 DEGREES
T OFFSET: 430 MS
TRICUSPID ANNULAR PLANE SYSTOLIC EXCURSION: 2 CM
VENTRICULAR RATE: 76 BPM

## 2025-07-07 PROCEDURE — 2500000004 HC RX 250 GENERAL PHARMACY W/ HCPCS (ALT 636 FOR OP/ED): Performed by: NURSE PRACTITIONER

## 2025-07-07 PROCEDURE — 96372 THER/PROPH/DIAG INJ SC/IM: CPT | Performed by: NURSE PRACTITIONER

## 2025-07-07 PROCEDURE — 2500000002 HC RX 250 W HCPCS SELF ADMINISTERED DRUGS (ALT 637 FOR MEDICARE OP, ALT 636 FOR OP/ED): Performed by: NURSE PRACTITIONER

## 2025-07-07 PROCEDURE — G0378 HOSPITAL OBSERVATION PER HR: HCPCS

## 2025-07-07 PROCEDURE — 2500000001 HC RX 250 WO HCPCS SELF ADMINISTERED DRUGS (ALT 637 FOR MEDICARE OP): Performed by: NURSE PRACTITIONER

## 2025-07-07 PROCEDURE — 93306 TTE W/DOPPLER COMPLETE: CPT | Performed by: INTERNAL MEDICINE

## 2025-07-07 PROCEDURE — 93005 ELECTROCARDIOGRAM TRACING: CPT

## 2025-07-07 PROCEDURE — 93306 TTE W/DOPPLER COMPLETE: CPT

## 2025-07-07 PROCEDURE — 99232 SBSQ HOSP IP/OBS MODERATE 35: CPT | Performed by: NURSE PRACTITIONER

## 2025-07-07 PROCEDURE — 97165 OT EVAL LOW COMPLEX 30 MIN: CPT | Mod: GO

## 2025-07-07 PROCEDURE — 2500000005 HC RX 250 GENERAL PHARMACY W/O HCPCS: Performed by: NURSE PRACTITIONER

## 2025-07-07 PROCEDURE — 97530 THERAPEUTIC ACTIVITIES: CPT | Mod: GP

## 2025-07-07 PROCEDURE — 80048 BASIC METABOLIC PNL TOTAL CA: CPT | Performed by: NURSE PRACTITIONER

## 2025-07-07 PROCEDURE — 36415 COLL VENOUS BLD VENIPUNCTURE: CPT | Performed by: NURSE PRACTITIONER

## 2025-07-07 PROCEDURE — 97162 PT EVAL MOD COMPLEX 30 MIN: CPT | Mod: GP

## 2025-07-07 RX ORDER — ACETAMINOPHEN 325 MG/1
650 TABLET ORAL EVERY 6 HOURS PRN
Status: DISCONTINUED | OUTPATIENT
Start: 2025-07-07 | End: 2025-07-08 | Stop reason: HOSPADM

## 2025-07-07 RX ORDER — POTASSIUM CHLORIDE 20 MEQ/1
40 TABLET, EXTENDED RELEASE ORAL ONCE
Status: COMPLETED | OUTPATIENT
Start: 2025-07-07 | End: 2025-07-07

## 2025-07-07 RX ORDER — ACETAMINOPHEN 500 MG
5 TABLET ORAL NIGHTLY PRN
Status: DISCONTINUED | OUTPATIENT
Start: 2025-07-07 | End: 2025-07-08 | Stop reason: HOSPADM

## 2025-07-07 RX ORDER — LIDOCAINE 560 MG/1
1 PATCH PERCUTANEOUS; TOPICAL; TRANSDERMAL DAILY
Status: DISCONTINUED | OUTPATIENT
Start: 2025-07-07 | End: 2025-07-08 | Stop reason: HOSPADM

## 2025-07-07 RX ADMIN — Medication 5 MG: at 23:38

## 2025-07-07 RX ADMIN — FUROSEMIDE 40 MG: 40 TABLET ORAL at 09:49

## 2025-07-07 RX ADMIN — HEPARIN SODIUM 5000 UNITS: 5000 INJECTION, SOLUTION INTRAVENOUS; SUBCUTANEOUS at 18:48

## 2025-07-07 RX ADMIN — POTASSIUM CHLORIDE 10 MEQ: 750 TABLET, EXTENDED RELEASE ORAL at 09:49

## 2025-07-07 RX ADMIN — ACETAMINOPHEN 650 MG: 325 TABLET ORAL at 16:20

## 2025-07-07 RX ADMIN — LIDOCAINE 4% 1 PATCH: 40 PATCH TOPICAL at 17:26

## 2025-07-07 RX ADMIN — NYSTATIN 1 APPLICATION: 100000 POWDER TOPICAL at 20:39

## 2025-07-07 RX ADMIN — ATORVASTATIN CALCIUM 40 MG: 40 TABLET, FILM COATED ORAL at 09:49

## 2025-07-07 RX ADMIN — LEVOTHYROXINE SODIUM 50 MCG: 0.05 TABLET ORAL at 05:20

## 2025-07-07 RX ADMIN — LOSARTAN POTASSIUM 50 MG: 50 TABLET, FILM COATED ORAL at 09:49

## 2025-07-07 RX ADMIN — PAROXETINE HYDROCHLORIDE 40 MG: 20 TABLET, FILM COATED ORAL at 09:49

## 2025-07-07 RX ADMIN — HEPARIN SODIUM 5000 UNITS: 5000 INJECTION, SOLUTION INTRAVENOUS; SUBCUTANEOUS at 05:20

## 2025-07-07 RX ADMIN — POTASSIUM CHLORIDE 40 MEQ: 1500 TABLET, EXTENDED RELEASE ORAL at 16:07

## 2025-07-07 SDOH — ECONOMIC STABILITY: HOUSING INSECURITY: AT ANY TIME IN THE PAST 12 MONTHS, WERE YOU HOMELESS OR LIVING IN A SHELTER (INCLUDING NOW)?: NO

## 2025-07-07 SDOH — SOCIAL STABILITY: SOCIAL INSECURITY: WITHIN THE LAST YEAR, HAVE YOU BEEN HUMILIATED OR EMOTIONALLY ABUSED IN OTHER WAYS BY YOUR PARTNER OR EX-PARTNER?: NO

## 2025-07-07 SDOH — ECONOMIC STABILITY: FOOD INSECURITY: HOW HARD IS IT FOR YOU TO PAY FOR THE VERY BASICS LIKE FOOD, HOUSING, MEDICAL CARE, AND HEATING?: NOT HARD AT ALL

## 2025-07-07 SDOH — ECONOMIC STABILITY: FOOD INSECURITY: WITHIN THE PAST 12 MONTHS, YOU WORRIED THAT YOUR FOOD WOULD RUN OUT BEFORE YOU GOT THE MONEY TO BUY MORE.: NEVER TRUE

## 2025-07-07 SDOH — ECONOMIC STABILITY: INCOME INSECURITY: IN THE PAST 12 MONTHS HAS THE ELECTRIC, GAS, OIL, OR WATER COMPANY THREATENED TO SHUT OFF SERVICES IN YOUR HOME?: NO

## 2025-07-07 SDOH — ECONOMIC STABILITY: FOOD INSECURITY: WITHIN THE PAST 12 MONTHS, THE FOOD YOU BOUGHT JUST DIDN'T LAST AND YOU DIDN'T HAVE MONEY TO GET MORE.: NEVER TRUE

## 2025-07-07 SDOH — HEALTH STABILITY: PHYSICAL HEALTH
HOW OFTEN DO YOU NEED TO HAVE SOMEONE HELP YOU WHEN YOU READ INSTRUCTIONS, PAMPHLETS, OR OTHER WRITTEN MATERIAL FROM YOUR DOCTOR OR PHARMACY?: NEVER

## 2025-07-07 SDOH — ECONOMIC STABILITY: TRANSPORTATION INSECURITY: IN THE PAST 12 MONTHS, HAS LACK OF TRANSPORTATION KEPT YOU FROM MEDICAL APPOINTMENTS OR FROM GETTING MEDICATIONS?: NO

## 2025-07-07 SDOH — SOCIAL STABILITY: SOCIAL INSECURITY: WITHIN THE LAST YEAR, HAVE YOU BEEN AFRAID OF YOUR PARTNER OR EX-PARTNER?: NO

## 2025-07-07 SDOH — ECONOMIC STABILITY: HOUSING INSECURITY: IN THE LAST 12 MONTHS, WAS THERE A TIME WHEN YOU WERE NOT ABLE TO PAY THE MORTGAGE OR RENT ON TIME?: NO

## 2025-07-07 SDOH — ECONOMIC STABILITY: HOUSING INSECURITY: IN THE PAST 12 MONTHS, HOW MANY TIMES HAVE YOU MOVED WHERE YOU WERE LIVING?: 0

## 2025-07-07 ASSESSMENT — COGNITIVE AND FUNCTIONAL STATUS - GENERAL
MOVING TO AND FROM BED TO CHAIR: A LOT
DRESSING REGULAR UPPER BODY CLOTHING: A LITTLE
CLIMB 3 TO 5 STEPS WITH RAILING: A LITTLE
MOVING FROM LYING ON BACK TO SITTING ON SIDE OF FLAT BED WITH BEDRAILS: A LITTLE
DAILY ACTIVITIY SCORE: 12
PERSONAL GROOMING: A LITTLE
STANDING UP FROM CHAIR USING ARMS: A LOT
WALKING IN HOSPITAL ROOM: A LOT
PERSONAL GROOMING: A LITTLE
HELP NEEDED FOR BATHING: A LITTLE
DRESSING REGULAR LOWER BODY CLOTHING: A LITTLE
HELP NEEDED FOR BATHING: A LOT
DAILY ACTIVITIY SCORE: 19
MOVING TO AND FROM BED TO CHAIR: A LOT
STANDING UP FROM CHAIR USING ARMS: A LOT
TURNING FROM BACK TO SIDE WHILE IN FLAT BAD: A LOT
DRESSING REGULAR UPPER BODY CLOTHING: A LOT
WALKING IN HOSPITAL ROOM: TOTAL
MOBILITY SCORE: 12
MOBILITY SCORE: 14
DRESSING REGULAR LOWER BODY CLOTHING: TOTAL
MOVING FROM LYING ON BACK TO SITTING ON SIDE OF FLAT BED WITH BEDRAILS: A LITTLE
TOILETING: TOTAL
CLIMB 3 TO 5 STEPS WITH RAILING: TOTAL
EATING MEALS: A LITTLE
TURNING FROM BACK TO SIDE WHILE IN FLAT BAD: A LITTLE
TOILETING: A LITTLE

## 2025-07-07 ASSESSMENT — PAIN - FUNCTIONAL ASSESSMENT
PAIN_FUNCTIONAL_ASSESSMENT: 0-10

## 2025-07-07 ASSESSMENT — PAIN SCALES - GENERAL
PAINLEVEL_OUTOF10: 2
PAINLEVEL_OUTOF10: 3
PAINLEVEL_OUTOF10: 0 - NO PAIN
PAINLEVEL_OUTOF10: 7
PAINLEVEL_OUTOF10: 2

## 2025-07-07 ASSESSMENT — PAIN DESCRIPTION - LOCATION: LOCATION: BACK

## 2025-07-07 ASSESSMENT — ACTIVITIES OF DAILY LIVING (ADL)
LACK_OF_TRANSPORTATION: NO
BATHING_ASSISTANCE: MODERATE
LACK_OF_TRANSPORTATION: NO
ADL_ASSISTANCE: NEEDS ASSISTANCE

## 2025-07-07 NOTE — PROGRESS NOTES
07/07/25 1055   Discharge Planning   Living Arrangements Spouse/significant other   Support Systems Spouse/significant other;Children   Assistance Needed Patient reports she was independent at home, but recently began struggling to care for herself.   Type of Residence Private residence   Who is requesting discharge planning? Provider   Expected Discharge Disposition SNF   Does the patient need discharge transport arranged? Yes   [place] [facility] coordination needed? Yes   Has discharge transport been arranged? No   Financial Resource Strain   How hard is it for you to pay for the very basics like food, housing, medical care, and heating? Not hard   Housing Stability   In the last 12 months, was there a time when you were not able to pay the mortgage or rent on time? N   In the past 12 months, how many times have you moved where you were living? 0   At any time in the past 12 months, were you homeless or living in a shelter (including now)? N   Transportation Needs   In the past 12 months, has lack of transportation kept you from medical appointments or from getting medications? no   In the past 12 months, has lack of transportation kept you from meetings, work, or from getting things needed for daily living? No   Patient Choice   Provider Choice list and CMS website (https://medicare.gov/care-compare#search) for post-acute Quality and Resource Measure Data were provided and reviewed with: Patient   Patient / Family choosing to utilize agency / facility established prior to hospitalization No   Stroke Family Assessment   Stroke Family Assessment Needed No   Intensity of Service   Intensity of Service 0-30 min     MSW met with patient at bedside. She is agreeable to SNF but wants to discuss options with her family. SNF choice list provided along with Care Transitions contact information. Patient/family to advise.     2:01 PM  PT is recommending moderate for the patient. Call placed to patient's daughter, Dianelys, but  no answer. Message left with contact information. Care Transitions will continue to follow.

## 2025-07-07 NOTE — PROGRESS NOTES
Physical Therapy Evaluation & Treatment    Patient Name: Carmen Cao  MRN: [id number]  Department: [facility]  Room: [facility]-A  Today's Date: 7/7/2025   Time Calculation  Start Time: 0914  Stop Time: 0937  Time Calculation (min): 23 min    Assessment/Plan   PT Assessment  PT Assessment Results: Decreased strength, Decreased range of motion, Decreased endurance, Impaired balance, Decreased mobility, Decreased coordination, Decreased cognition, Impaired judgement, Decreased safety awareness, Impaired vision, Obesity, Decreased skin integrity, Pain  Rehab Prognosis: Fair  Barriers to Discharge Home: Caregiver assistance, Cognition needs, Physical needs  Caregiver Assistance: Caregiver assistance needed per identified barriers - however, level of patient's required assistance exceeds assistance available at home  Cognition Needs: Insight of patient limited regarding functional ability/needs (Decreased recollection/Memory)  Physical Needs: 24hr mobility assistance needed, 24hr ADL assistance needed, High falls risk due to function or environment  Evaluation/Treatment Tolerance: Patient tolerated treatment well  Medical Staff Made Aware: Yes  Strengths: Support of Caregivers  Barriers to Participation: Comorbidities, Insight into problems, Ability to acquire knowledge  End of Session Communication: Bedside nurse  Assessment Comment: 80 year old female admits with asthenia, HTN, edema, anxiety, and depression. On eval, he demonstrates impaired safe mobility warranting skilled PT care. At DC, moderate intensity rehab is recommended.  End of Session Patient Position: Up in chair, Alarm on   IP OR SWING BED PT PLAN  Inpatient or Swing Bed: Inpatient  PT Plan  Treatment/Interventions: Bed mobility, Transfer training, Gait training, Balance training, Strengthening, Endurance training, Therapeutic exercise, Therapeutic activity, Home exercise program, Positioning, Postural re-education  PT Plan: Ongoing PT  PT Frequency: 4 times  per week (during acute hospitalization)  PT Discharge Recommendations: Moderate intensity level of continued care (Based on current functional status and rehab potential, patient is anticipated to tolerate and benefit from 5 or more days per week of skilled rehabilitative therapy after discharge from this acute inpatient hospitalization.)  Equipment Recommended upon Discharge: Wheeled walker  PT Recommended Transfer Status: Assist x2, Assistive device  PT - OK to Discharge: Yes      Subjective     PT Visit Info:  PT Received On: 07/07/25  General Visit Information:  General  Reason for Referral: Impaired Mobility-Weakness  Referred By: Dr. Mooney  Past Medical History Relevant to Rehab: HTN, Anxiety, Depression  Family/Caregiver Present: No  Prior to Session Communication: Bedside nurse  Patient Position Received: Bed, 3 rail up, Alarm on  Preferred Learning Style: verbal, visual  General Comment: Agreeable to PT eval  Home Living:  Home Living  Type of Home: House  Lives With: Spouse  Home Adaptive Equipment: Walker rolling or standard  Home Layout: One level  Home Access: Stairs to enter with rails  Entrance Stairs-Rails: Right  Entrance Stairs-Number of Steps: 3  Bathroom Shower/Tub: Tub/shower unit  Bathroom Equipment: Grab bars in shower  Prior Level of Function:  Prior Function Per Pt/Caregiver Report  Level of [place]: Needs assistance with ADLs, Needs assistance with homemaking, Needs assistance with functional transfers  Receives Help From: Family  ADL Assistance:  (Initially, Pt reports she is ind with dressing and bathing. When PT seeks details on how she does these tasks, she reports DTR bathes her. Not able to describe what is hard about bathing that she requires assist with)  Homemaking Assistance: Needs assistance (likely completion by family?)  Ambulatory Assistance: Needs assistance (Pt reports she is ind with SW use and denies fall Hx yet admits with weakness, struggles with transfers, and  "is very fearful of falling when EOB +attempting transfers suggesting likely mobility deficits pre-admit)  Precautions:  Precautions  Medical Precautions: Fall precautions     Date/Time Vitals Session Patient Position Pulse Resp SpO2 BP MAP (mmHg)    07/07/25 0900 --  --  72  18  96 %  160/74  103                Objective   Pain:  Pain Assessment  Pain Assessment: 0-10  0-10 (Numeric) Pain Score: 0 - No pain  Cognition:  Cognition  Overall Cognitive Status: Impaired at baseline, Impaired  Orientation Level: Disoriented to situation  Cognition Comments: \"My memory isn't what it used to be\"-very pleasant but confused. Follows all commands appropriately. Poor historian  Memory: Exceptions to WFL  Long-Term Memory: Impaired  Short-Term Memory: Impaired  Insight: Severe  Impulsive: Mildly  Processing Speed: Delayed    General Assessments:  Activity Tolerance  Endurance: Decreased tolerance for upright activites    Sensation  Light Touch:  (denies abn BUEs, BLEs)    Strength  Strength Comments: 2/5 BLEs on MMT  Functional Assessments:  Bed Mobility  Bed Mobility: Yes  Bed Mobility 1  Bed Mobility 1: Supine to sitting  Level of Assistance 1: Minimum assistance  Bed Mobility Comments 1: HOB elevated, assist for trunk up. Very fearful of falling near EOB and needs encouragement that PT will keep her safe throughout    Transfers  Transfer: Yes  Transfer 1  Transfer From 1: Bed to  Transfer to 1: Stand  Technique 1: Sit to stand, Stand to sit  Transfer Device 1: Walker  Transfer Level of Assistance 1: Maximum assistance  Trials/Comments 1: assist for uprighting with cues for setup, AD use, and BUE pushoff. Needs heavy Max A. Once standing, attempted sidesteps towards HOB but Pt is unable to advance feet at all  Transfers 2  Transfer From 2: Bed to  Transfer to 2: Chair with arms  Technique 2: Stand pivot  Transfer Device 2: Walker  Transfer Level of Assistance 2: Maximum assistance  Trials/Comments 2: assist for uprighting, " pivot, safe descent down to chair, and AD advancement throughout. Cues on BUE pushoff, AD use, setup, allignment, and reaching back to control descent    Ambulation/Gait Training  Ambulation/Gait Training Performed: No    Treatments:  Bed Mobility  Bed Mobility: Yes  Bed Mobility 1  Bed Mobility 1: Supine to sitting  Level of Assistance 1: Minimum assistance  Bed Mobility Comments 1: HOB elevated, assist for trunk up. Very fearful of falling near EOB and needs encouragement that PT will keep her safe throughout    Transfers  Transfer: Yes  Transfer 1  Transfer From 1: Bed to  Transfer to 1: Stand  Technique 1: Sit to stand, Stand to sit  Transfer Device 1: Walker  Transfer Level of Assistance 1: Maximum assistance  Trials/Comments 1: assist for uprighting with cues for setup, AD use, and BUE pushoff. Needs heavy Max A. Once standing, attempted sidesteps towards HOB but Pt is unable to advance feet at all  Transfers 2  Transfer From 2: Bed to  Transfer to 2: Chair with arms  Technique 2: Stand pivot  Transfer Device 2: Walker  Transfer Level of Assistance 2: Maximum assistance  Trials/Comments 2: assist for uprighting, pivot, safe descent down to chair, and AD advancement throughout. Cues on BUE pushoff, AD use, setup, allignment, and reaching back to control descent    Education as below    Outcome Measures:  [facility] Basic Mobility  Turning from your back to your side while in a flat bed without using bedrails: A little  Moving from lying on your back to sitting on the side of a flat bed without using bedrails: A little  Moving to and from bed to chair (including a wheelchair): A lot  Standing up from a chair using your arms (e.g. wheelchair or bedside chair): A lot  To walk in hospital room: Total  Climbing 3-5 steps with railing: Total  Basic Mobility - Total Score: 12    Encounter Problems       Encounter Problems (Active)       Balance       Sitting Balance (Progressing)       Start:  07/07/25    Expected End:   07/21/25       Pt will demonstrate good sitting balance to promote safe engagement with out of bed activities           Standing Balance (Progressing)       Start:  07/07/25    Expected End:  07/21/25       Pt will demonstrate good static standing balance to promote safe participation with out of bed activity, transfers, and mobility              Mobility       Ambulation (Progressing)       Start:  07/07/25    Expected End:  07/21/25       Pt will ambulate 50' modified independent assist with walker to promote safe home mobility              PT Transfers       Supine to sit (Progressing)       Start:  07/07/25    Expected End:  07/21/25       Pt will transfer supine to sitting at edge of bed with modified independent assist to promote acute care out of bed activity           Sit to stand (Progressing)       Start:  07/07/25    Expected End:  07/21/25       Pt will transfer sit to standing position with modified independent assist and walker to promote safe out of bed activity           Bed to chair (Progressing)       Start:  07/07/25    Expected End:  07/21/25       Pt will transfer from sitting edge of bed to the chair with modified independent assist and walker to promote out of bed activity and reduce the risks of prolonged acute care bedrest              Safety       Safe Mobility Techniques (Progressing)       Start:  07/07/25    Expected End:  07/21/25       Pt will correctly identify and demonstrate safe mobility techniques to reduce their risks for falls during their acute care stay                   Education Documentation  Mobility Training, taught by Chris Mcclain, PT at 7/7/2025 10:01 AM.  Learner: Patient  Readiness: Acceptance  Method: Explanation, Demonstration  Response: Needs Reinforcement  Comment: safe mobility techniques    Education Comments  No comments found.

## 2025-07-07 NOTE — ED PROVIDER NOTES
Emergency Department Provider Note       History of Present Illness     History provided by: Patient and Family Member  Limitations to History: Dementia  External Records Reviewed with Brief Summary: None    HPI:  Carmen Cao is a 80 y.o. female presents for weakness.  Patient is pleasantly demented and does not know why she is here.  Per the family, she is generally taken care of by her elderly 85-year-old .  They state that she has had increasing difficulty with walking over the past several days to the point where she could no longer walk without significant assistance.  Due to increasing weakness at home, her daughter who is a healthcare tells me that the are where they can no longer care for her.  Patient otherwise has no complaints when I speak to her    Physical Exam   Triage vitals:  T 36.4 °C (97.6 °F)  HR 77  /85  RR 16  O2 (!) 92 % None (Room air)    Physical Exam  Vitals and nursing note reviewed.   Constitutional:       Appearance: Normal appearance.   HENT:      Head: Normocephalic and atraumatic.      Nose: Nose normal.      Mouth/Throat:      Mouth: Mucous membranes are moist.   Eyes:      Extraocular Movements: Extraocular movements intact.      Conjunctiva/sclera: Conjunctivae normal.   Cardiovascular:      Rate and Rhythm: Normal rate and regular rhythm.      Pulses: Normal pulses.      Heart sounds: Normal heart sounds.   Pulmonary:      Effort: Pulmonary effort is normal.      Breath sounds: Normal breath sounds.   Musculoskeletal:         General: Normal range of motion.      Cervical back: Normal range of motion and neck supple.   Skin:     General: Skin is warm and dry.      Capillary Refill: Capillary refill takes less than 2 seconds.   Neurological:      General: No focal deficit present.      Mental Status: She is alert. Mental status is at baseline.           Medical Decision Making & ED Course   Medical Decision Makin y.o. female presents for weakness.        she is a chronic weakness that is slowly being exacerbated.  I will workup for electrolyte derangement or possible infection.    X-ray interpreted by myself, no acute infiltrate or fractures, CBC and CMP unremarkable outside of a very slight hypokalemia which will be repleted here.  Her troponin is negative, viral swabs negative, urinalysis unremarkable.    I did have the patient attempt to walk, she could not even stand without assistance to get to the bathroom.  At this time she is an unsafe discharge, she has been cared for by her elderly  then concerned that they will fall and break her hip.  I can find no organic cause for her current weakness, she would benefit from PT and OT evaluation however she likely needs placement into skilled nursing facility.  I spoke with the hospitalist who will accept the patient for further evaluation  ----      Differential diagnoses considered include but are not limited to: UTI versus pneumonia versus electrolyte derangement    Social Determinants of Health which Significantly Impact Care: Social Determinants of Health which Significantly Impact Care: Problems related to living alone     EKG Independent Interpretation: EKG interpreted by myself. Please see ED Course for full interpretation.    Independent Result Review and Interpretation: Relevant laboratory and radiographic results were reviewed and independently interpreted by myself.  As necessary, they are commented on in the ED Course.    Chronic conditions affecting the patient's care: As documented above in [facility]    The patient was discussed with the following consultants/services: Hospitalist/Admitting Provider who accepted the patient for admission    Care Considerations: As documented above in [facility]    ED Course:  ED Course as of 07/07/25 0223   Sun Jul 06, 2025   1550 EKG interpreted by myself at 1550  Sinus rhythm with a rate of 76  No CO, QRS or QT prolongation.   No ST elevations or depressions concerning for  STEMI.    [SY]      ED Course User Index  [SY] Johnnie Pham MD         Diagnoses as of 07/07/25 0223   Failure to thrive in adult       Disposition   As a result of their workup, the patient will require admission to the hospital.  The patient was informed of her diagnosis.  The patient was given the opportunity to ask questions and I answered them. The patient agreed to be admitted to the hospital.    Procedures   Procedures        Johnnie Pham MD  Emergency Medicine                                                       Johnnie Pham MD  07/07/25 0234

## 2025-07-07 NOTE — CARE PLAN
The patient's goals for the shift include  rest     The clinical goals for the shift include Safety      Problem: Pain - Adult  Goal: Verbalizes/displays adequate comfort level or baseline comfort level  Outcome: Progressing     Problem: Safety - Adult  Goal: Free from fall injury  Outcome: Progressing     Problem: Discharge Planning  Goal: Discharge to home or other facility with appropriate resources  Outcome: Progressing

## 2025-07-07 NOTE — PROGRESS NOTES
Carmen Cao is a 80 y.o. female on day 0 of admission presenting with Weakness.    Subjective   Pt seen and examined with nurse present.  Pt sitting up in chair.  No complaints voiced.        Objective     Physical Exam  Constitutional:       General: She is not in acute distress.     Appearance: She is not ill-appearing or toxic-appearing.   Cardiovascular:      Rate and Rhythm: Normal rate and regular rhythm.      Pulses: Normal pulses.   Pulmonary:      Effort: Pulmonary effort is normal. No respiratory distress.      Breath sounds: Normal breath sounds. No wheezing, rhonchi or rales.   Abdominal:      General: Bowel sounds are normal.      Palpations: Abdomen is soft.   Musculoskeletal:      Right lower leg: No edema.      Left lower leg: No edema.   Skin:     General: Skin is warm and dry.   Neurological:      General: No focal deficit present.      Mental Status: She is alert and oriented to person, place, and time.         Last Recorded Vitals  Blood pressure 160/74, pulse 72, temperature 36.4 °C (97.5 °F), temperature source Temporal, resp. rate 18, height (!) 1.524 m (5'), weight 78.5 kg (173 lb), SpO2 96%.  Intake/Output last 3 Shifts:  No intake/output data recorded.    Relevant Results    Scheduled medications  Scheduled Medications[1]  Continuous medications  Continuous Medications[2]  PRN medications  PRN Medications[3]     following data reviewed      Na-139  K+-3.0  Cl-101  Bicarb-29  BUN-13  creatinine-0.6                 Assessment and Plan     Asthenia  -PT/OT  recommend mod. Intensity rehab  -pt wants to go to SNF    Hypertension  -Continue Losartan  -BP stable, monitor     Edema  -Continue Lasix 40 mg daily  -CXR no acute findings  -ECHO  -Monitor      Anxiety/Depression  -Continue Paxil  -Hydroxyzine prn         Plan  Above plan discussed with pt                  ERICA Frazier-CNP         [1] atorvastatin, 40 mg, oral, Daily  furosemide, 40 mg, oral, Daily  heparin, 5,000 Units,  subcutaneous, q12h  levothyroxine, 50 mcg, oral, Daily  losartan, 50 mg, oral, Daily  nystatin, 1 Application, Topical, BID  PARoxetine, 40 mg, oral, Daily  potassium chloride CR, 10 mEq, oral, Daily  [2]    [3] PRN medications: hydrOXYzine HCL

## 2025-07-07 NOTE — CARE PLAN
Problem: Pain - Adult  Goal: Verbalizes/displays adequate comfort level or baseline comfort level  Outcome: Progressing     Problem: Safety - Adult  Goal: Free from fall injury  Outcome: Progressing     Problem: Discharge Planning  Goal: Discharge to home or other facility with appropriate resources  Outcome: Progressing     Problem: Chronic Conditions and Co-morbidities  Goal: Patient's chronic conditions and co-morbidity symptoms are monitored and maintained or improved  Outcome: Progressing     Problem: Nutrition  Goal: Nutrient intake appropriate for maintaining nutritional needs  Outcome: Progressing     Problem: Fall/Injury  Goal: Not fall by end of shift  Outcome: Progressing  Goal: Be free from injury by end of the shift  Outcome: Progressing  Goal: Verbalize understanding of personal risk factors for fall in the hospital  Outcome: Progressing  Goal: Verbalize understanding of risk factor reduction measures to prevent injury from fall in the home  Outcome: Progressing  Goal: Use assistive devices by end of the shift  Outcome: Progressing  Goal: Pace activities to prevent fatigue by end of the shift  Outcome: Progressing   The patient's goals for the shift include      The clinical goals for the shift include Rest    Over the shift, the patient did make progress to the above goals.

## 2025-07-07 NOTE — PROGRESS NOTES
Occupational Therapy    Evaluation    Patient Name: Carmen Cao  MRN: [id number]  Department: [facility] 3 S  Room: [facility]-A  Today's Date: 7/7/2025  Time Calculation  Start Time: 1417  Stop Time: 1431  Time Calculation (min): 14 min    Assessment  IP OT Assessment  OT Assessment: Patient is an 80 year old female admitted with asthenia, HTN, edema, anxiety, and depression. Patient is presenting below baseline level with a decline in strength, balance, activity tolerance, and function, resulting in an increased need for assist with ADLs and transfers. Patient is a high fall risk. Recommend skilled OT to address the above deficits and maximize patient's safety and independence with daily tasks.  Prognosis: Good  Barriers to Discharge Home: Caregiver assistance, Cognition needs, Physical needs  Caregiver Assistance: Caregiver assistance needed per identified barriers - however, level of patient's required assistance exceeds assistance available at home  Cognition Needs: Insight of patient limited regarding functional ability/needs, 24hr supervision for safety awareness needed  Physical Needs: 24hr mobility assistance needed, 24hr ADL assistance needed, High falls risk due to function or environment  Evaluation/Treatment Tolerance: Patient limited by fatigue  End of Session Communication: Bedside nurse  End of Session Patient Position: Up in chair, Alarm on  Plan:  Treatment Interventions: ADL retraining, Functional transfer training, UE strengthening/ROM, Endurance training, Cognitive reorientation, Patient/family training, Equipment evaluation/education, Neuromuscular reeducation, Compensatory technique education  OT Frequency: 3 times per week (during this acute inpatient hospitalization)  OT Discharge Recommendations: Moderate intensity level of continued care (Based on current functional status and rehab potential, patient is anticipated to tolerate and benefit from 5 or more days per week of skilled rehabilitative  therapy after discharge from this acute inpatient hospitalization.)  Equipment Recommended upon Discharge: Wheeled walker  OT Recommended Transfer Status: Assistive equipment (Comment), Assist of 2  OT - OK to Discharge: Yes    Subjective   Current Problem:  1. Edema, unspecified type  Transthoracic Echo Complete    Transthoracic Echo Complete      2. CHF (congestive heart failure), NYHA class I, acute on chronic, combined  Transthoracic Echo Complete    Transthoracic Echo Complete      3. Failure to thrive in adult        4. Generalized edema  Transthoracic Echo Complete        OT Visit Info:  OT Received On: 07/07/25  General Visit Info:  General  Reason for Referral: decline in ADLs, weakness  Referred By: Dr. Mooney  Past Medical History Relevant to Rehab: HTN, Anxiety, Depression  Family/Caregiver Present: No  Prior to Session Communication: Bedside nurse  Patient Position Received: Up in chair, Alarm on  Preferred Learning Style: verbal, visual  General Comment: Patient is an 80 year old female who presented to the ED with c/o weakness. Patient admitted with asthenia, HTN, edema, anxiety and depression. Patient is cleared by nursing for therapy. Patient in the chair upon arrival and agreeable to participate.  Precautions:  Hearing/Visual Limitations: glasses  Medical Precautions: Fall precautions    Pain:  Pain Assessment  Pain Assessment: 0-10  0-10 (Numeric) Pain Score: 7  Pain Type: Chronic pain  Pain Location: Back  Pain Interventions: Repositioned, Ambulation/increased activity (RN aware)  Response to Interventions: Content/relaxed    Objective   Cognition:  Overall Cognitive Status: Impaired at baseline (hx of dementia)  Orientation Level: Disoriented to situation  Cognition Comments: patient able to tell me her name, that we are at [facility], the month and year. she is pleasant and cooperative. anxious/fearful with mobility. able to follow commands throughout  Insight: Moderate  Impulsive:  Mildly  Processing Speed: Delayed     Home Living:  Type of Home: House  Lives With: Spouse  Home Adaptive Equipment: Walker rolling or standard  Home Layout: One level  Home Access: Stairs to enter with rails  Entrance Stairs-Rails: Right  Entrance Stairs-Number of Steps: 3  Bathroom Shower/Tub: Tub/shower unit  Bathroom Equipment: Grab bars in shower, Shower chair with back   Prior Function:  Level of Centre: Needs assistance with ADLs, Needs assistance with homemaking, Needs assistance with functional transfers  Receives Help From: Family  ADL Assistance: Needs assistance (daughter or spouse assist with bathing)  Homemaking Assistance: Needs assistance (has an aide come 3x week. has cleaning service)  Ambulatory Assistance: Independent (uses standard walker per patient)  Prior Function Comments: patient with a hx of falls  IADL History:  Homemaking Responsibilities: No  IADL Comments: family/aide assist. neither patient or her spouse drive  ADL:  Eating Assistance: Stand by  Eating Deficit: Setup (all items within reach)  Grooming Assistance: Stand by  Grooming Deficit: Setup, Supervision/safety (per clinical judgement, seated)  Bathing Assistance: Moderate  Bathing Deficit: Setup, Steadying, Verbal cueing, Supervision/safety, Increased time to complete , Perineal area, Buttocks, Right lower leg including foot, Left lower leg including foot (per clinical judgement)  UE Dressing Assistance: Moderate  UE Dressing Deficit: Pull over head, Pull around back, Pull down in back (per clinical judgement)  LE Dressing Assistance: Total  LE Dressing Deficit: Don/doff R sock, Don/doff L sock  Toileting Assistance with Device: Total  Toileting Deficit: Perineal hygiene, Clothing management up, Clothing management down, Bedside commode (per clinical judgement)  Activity Tolerance:  Endurance: Decreased tolerance for upright activites  Activity Tolerance Comments: very anxious and fearful during mobility  Bed  Mobility/Transfers: Bed Mobility  Bed Mobility: No (patient OOB pre/post therapy)    Transfers  Transfer: Yes  Transfer 1  Transfer From 1: Chair with arms to  Transfer to 1: Stand  Technique 1: Sit to stand  Transfer Device 1: Walker  Transfer Level of Assistance 1: Maximum assistance  Trials/Comments 1: Max A to stand from the chair to standard walker. assistance to shift weight forward/upright. very anxious and fearful with encouragement, reassurance and education provided throughout    Functional Mobility:  Functional Mobility  Functional Mobility Performed: Yes  Functional Mobility 1  Surface 1: Level tile  Device 1: Standard walker  Assistance 1: Maximum assistance  Comments 1: cues on technique. assistance to manage walker and move it forward. patient takes ~2 steps forward with close chair follow    Standing Balance:  Static Standing Balance  Static Standing-Balance Support: Bilateral upper extremity supported  Static Standing-Level of Assistance: Moderate assistance  Static Standing-Comment/Number of Minutes: posterior    IADL's:   Homemaking Responsibilities: No  IADL Comments: family/aide assist. neither patient or her spouse drive  Vision: Vision - Basic Assessment  Current Vision: Wears glasses all the time  Sensation:  Sensation Comment: patient denies numbness/tingling  Strength:  Strength Comments: B UE 4-/5 grossly  Coordination:  Movements are Fluid and Coordinated: No  Coordination Comment: slow, weak, effortful   Hand Function:  Hand Function  Gross Grasp: Functional  Coordination: Functional  Extremities: RUE   RUE : Within Functional Limits and LUE   LUE: Within Functional Limits    Outcome Measures: [facility] Daily Activity  Putting on and taking off regular lower body clothing: Total  Bathing (including washing, rinsing, drying): A lot  Putting on and taking off regular upper body clothing: A lot  Toileting, which includes using toilet, bedpan or urinal: Total  Taking care of personal grooming  such as brushing teeth: A little  Eating Meals: A little  Daily Activity - Total Score: 12    Education Documentation  Body Mechanics, taught by Meera Wright OT at 7/7/2025  2:46 PM.  Learner: Patient  Readiness: Acceptance  Method: Explanation, Demonstration  Response: Needs Reinforcement  Comment: Reviewed OT POC. Education on safe functional transfers, mobility, fall risks    Precautions, taught by Meera Wright OT at 7/7/2025  2:46 PM.  Learner: Patient  Readiness: Acceptance  Method: Explanation, Demonstration  Response: Needs Reinforcement  Comment: Reviewed OT POC. Education on safe functional transfers, mobility, fall risks    ADL Training, taught by Meera Wright OT at 7/7/2025  2:46 PM.  Learner: Patient  Readiness: Acceptance  Method: Explanation, Demonstration  Response: Needs Reinforcement  Comment: Reviewed OT POC. Education on safe functional transfers, mobility, fall risks    Education Comments  No comments found.      Goals:   Encounter Problems       Encounter Problems (Active)       OT Goals       UB ADLs (Progressing)       Start:  07/07/25    Expected End:  07/28/25       Patient will complete UB ADL tasks, with set-up using AE need in order to increase patient's safety and independence with self-care tasks.          LB ADLs (Progressing)       Start:  07/07/25    Expected End:  07/28/25       Patient will complete LB ADL tasks, with minimal assist  using AE need in order to increase patient's safety and independence with self-care tasks.          Functional Transfers (Progressing)       Start:  07/07/25    Expected End:  07/28/25       Patient will complete functional transfers with minimal assist  using least restrictive device, in order to increase patient's safety and independence with daily tasks.          Functional Mobility  (Progressing)       Start:  07/07/25    Expected End:  07/28/25       Patient will demonstrate the ability to complete item retrieval and functional mobility  with minimal assist  in order to increase safety and independence with daily tasks.          Activity Tolerance  (Progressing)       Start:  07/07/25    Expected End:  07/28/25       Patient will demonstrate the ability to participate in functional activity at least >/= 25 minutes in order to increase patient's safety and independence with daily tasks.                IBW for protein; 300% IBW

## 2025-07-08 VITALS
RESPIRATION RATE: 18 BRPM | WEIGHT: 173 LBS | BODY MASS INDEX: 33.96 KG/M2 | HEART RATE: 71 BPM | DIASTOLIC BLOOD PRESSURE: 77 MMHG | TEMPERATURE: 97.2 F | OXYGEN SATURATION: 94 % | HEIGHT: 60 IN | SYSTOLIC BLOOD PRESSURE: 156 MMHG

## 2025-07-08 LAB
ANION GAP SERPL CALCULATED.3IONS-SCNC: 13 MMOL/L (ref 10–20)
BUN SERPL-MCNC: 18 MG/DL (ref 6–23)
CALCIUM SERPL-MCNC: 8.9 MG/DL (ref 8.6–10.3)
CHLORIDE SERPL-SCNC: 97 MMOL/L (ref 98–107)
CO2 SERPL-SCNC: 31 MMOL/L (ref 21–32)
CREAT SERPL-MCNC: 0.71 MG/DL (ref 0.5–1.05)
EGFRCR SERPLBLD CKD-EPI 2021: 85 ML/MIN/1.73M*2
GLUCOSE SERPL-MCNC: 123 MG/DL (ref 74–99)
POTASSIUM SERPL-SCNC: 3.7 MMOL/L (ref 3.5–5.3)
SODIUM SERPL-SCNC: 137 MMOL/L (ref 136–145)

## 2025-07-08 PROCEDURE — 2500000001 HC RX 250 WO HCPCS SELF ADMINISTERED DRUGS (ALT 637 FOR MEDICARE OP): Performed by: NURSE PRACTITIONER

## 2025-07-08 PROCEDURE — 2500000005 HC RX 250 GENERAL PHARMACY W/O HCPCS: Performed by: NURSE PRACTITIONER

## 2025-07-08 PROCEDURE — 2500000002 HC RX 250 W HCPCS SELF ADMINISTERED DRUGS (ALT 637 FOR MEDICARE OP, ALT 636 FOR OP/ED): Performed by: NURSE PRACTITIONER

## 2025-07-08 PROCEDURE — 96372 THER/PROPH/DIAG INJ SC/IM: CPT | Performed by: NURSE PRACTITIONER

## 2025-07-08 PROCEDURE — G0378 HOSPITAL OBSERVATION PER HR: HCPCS

## 2025-07-08 PROCEDURE — 99239 HOSP IP/OBS DSCHRG MGMT >30: CPT | Performed by: NURSE PRACTITIONER

## 2025-07-08 PROCEDURE — 2500000004 HC RX 250 GENERAL PHARMACY W/ HCPCS (ALT 636 FOR OP/ED): Performed by: NURSE PRACTITIONER

## 2025-07-08 PROCEDURE — 80048 BASIC METABOLIC PNL TOTAL CA: CPT | Performed by: NURSE PRACTITIONER

## 2025-07-08 PROCEDURE — 36415 COLL VENOUS BLD VENIPUNCTURE: CPT | Performed by: NURSE PRACTITIONER

## 2025-07-08 RX ORDER — LIDOCAINE 560 MG/1
1 PATCH PERCUTANEOUS; TOPICAL; TRANSDERMAL DAILY
Start: 2025-07-09

## 2025-07-08 RX ORDER — ACETAMINOPHEN 325 MG/1
650 TABLET ORAL EVERY 6 HOURS PRN
Start: 2025-07-08

## 2025-07-08 RX ORDER — NYSTATIN 100000 [USP'U]/G
1 POWDER TOPICAL 2 TIMES DAILY
Start: 2025-07-08

## 2025-07-08 RX ADMIN — ATORVASTATIN CALCIUM 40 MG: 40 TABLET, FILM COATED ORAL at 09:01

## 2025-07-08 RX ADMIN — FUROSEMIDE 40 MG: 40 TABLET ORAL at 09:01

## 2025-07-08 RX ADMIN — LEVOTHYROXINE SODIUM 50 MCG: 0.05 TABLET ORAL at 06:42

## 2025-07-08 RX ADMIN — ACETAMINOPHEN 650 MG: 325 TABLET ORAL at 13:39

## 2025-07-08 RX ADMIN — POTASSIUM CHLORIDE 10 MEQ: 750 TABLET, EXTENDED RELEASE ORAL at 09:01

## 2025-07-08 RX ADMIN — HYDROXYZINE HYDROCHLORIDE 25 MG: 25 TABLET, FILM COATED ORAL at 14:59

## 2025-07-08 RX ADMIN — LOSARTAN POTASSIUM 50 MG: 50 TABLET, FILM COATED ORAL at 09:01

## 2025-07-08 RX ADMIN — HEPARIN SODIUM 5000 UNITS: 5000 INJECTION, SOLUTION INTRAVENOUS; SUBCUTANEOUS at 06:42

## 2025-07-08 RX ADMIN — LIDOCAINE 4% 1 PATCH: 40 PATCH TOPICAL at 09:01

## 2025-07-08 RX ADMIN — PAROXETINE HYDROCHLORIDE 40 MG: 20 TABLET, FILM COATED ORAL at 09:09

## 2025-07-08 ASSESSMENT — COGNITIVE AND FUNCTIONAL STATUS - GENERAL
DAILY ACTIVITIY SCORE: 19
PERSONAL GROOMING: A LITTLE
DRESSING REGULAR UPPER BODY CLOTHING: A LITTLE
HELP NEEDED FOR BATHING: A LITTLE
CLIMB 3 TO 5 STEPS WITH RAILING: A LITTLE
MOVING TO AND FROM BED TO CHAIR: A LOT
DRESSING REGULAR LOWER BODY CLOTHING: A LITTLE
STANDING UP FROM CHAIR USING ARMS: A LOT
MOVING FROM LYING ON BACK TO SITTING ON SIDE OF FLAT BED WITH BEDRAILS: A LITTLE
WALKING IN HOSPITAL ROOM: A LOT
MOBILITY SCORE: 14
TOILETING: A LITTLE
TURNING FROM BACK TO SIDE WHILE IN FLAT BAD: A LOT

## 2025-07-08 ASSESSMENT — PAIN DESCRIPTION - LOCATION: LOCATION: BACK

## 2025-07-08 ASSESSMENT — PAIN SCALES - GENERAL
PAINLEVEL_OUTOF10: 0 - NO PAIN
PAINLEVEL_OUTOF10: 6

## 2025-07-08 ASSESSMENT — PAIN - FUNCTIONAL ASSESSMENT
PAIN_FUNCTIONAL_ASSESSMENT: 0-10
PAIN_FUNCTIONAL_ASSESSMENT: FLACC (FACE, LEGS, ACTIVITY, CRY, CONSOLABILITY)

## 2025-07-08 ASSESSMENT — PAIN DESCRIPTION - DESCRIPTORS: DESCRIPTORS: ACHING

## 2025-07-08 NOTE — CARE PLAN
The patient's goals for the shift include      The clinical goals for the shift include hds, safety    Over the shift, the patient did not make progress toward the following goals. Barriers to progression include . Recommendations to address these barriers include .

## 2025-07-08 NOTE — DISCHARGE SUMMARY
Discharge Diagnosis  Weakness           Issues Requiring Follow-Up      Discharge Meds     Medication List      START taking these medications     acetaminophen 325 mg tablet; Commonly known as: Tylenol; Take 2 tablets   (650 mg) by mouth every 6 hours if needed for moderate pain (4 - 6).   lidocaine 4 % patch; Place 1 patch over 12 hours on the skin once daily.   Remove & discard patch within 12 hours or as directed by MD.; Start taking   on: July 9, 2025   nystatin 100,000 unit/gram powder; Commonly known as: Mycostatin; Apply   1 Application topically 2 times a day.     CONTINUE taking these medications     atorvastatin 40 mg tablet; Commonly known as: Lipitor; Take 1 tablet (40   mg) by mouth once daily.   furosemide 40 mg tablet; Commonly known as: Lasix; Take 1 tablet (40 mg)   by mouth once daily.   levothyroxine 50 mcg tablet; Commonly known as: Synthroid, Levoxyl; Take   1 tablet (50 mcg) by mouth early in the morning..   losartan 50 mg tablet; Commonly known as: Cozaar; Take 1 tablet (50 mg)   by mouth once daily.   PARoxetine 40 mg tablet; Commonly known as: Paxil; Take 1 tablet (40 mg)   by mouth once daily.   potassium chloride CR 10 mEq ER tablet; Commonly known as: Klor-Con M10;   Take 1 tablet (10 mEq) by mouth once daily.       Test Results Pending At Discharge  Pending Labs       Order Current Status    Extra Urine Gray Tube Collected (07/06/25 1638)    Urinalysis with Reflex Culture and Microscopic In process            Hospital Course  HPI from admission  Carmen Cao is a 80 y.o. female, with CHF, Hypertension, and cognitive impairment, who presents to the ED for evaluation of increasing weakness over the last few days. Pt lives with her  and normally ambulates with a walker. Daughter is an NP and is very involved. She reports pt has had increasing lower extremity edema over the last several days and she has given pt extra doses of Lasix. Edema is now improved to baseline. Labs were  drawn in ED and CBC is unremarkable. CMP is pending. UA is negative. CXR is pending. The patient is A&Ox2-3. She is on room air and denies dyspnea. No fever, chills, or recent illness.          During hospital course pt was a PT, OT who recommended moderate intensity rehab.  Patient's daughter and patient are agreeable to skilled nursing facility.  Patient has been accepted at discharge and health care and will be discharged when arrangements have been made by case management.    Above Case and plan discussed collaborating physician, time spent on discharge 35 minutes    Pertinent Physical Exam At Time of Discharge  Physical Exam  Constitutional:       General: She is not in acute distress.     Appearance: She is not ill-appearing or toxic-appearing.   Cardiovascular:      Rate and Rhythm: Normal rate and regular rhythm.      Pulses: Normal pulses.      Heart sounds: Normal heart sounds.   Pulmonary:      Effort: Pulmonary effort is normal. No respiratory distress.      Breath sounds: Normal breath sounds. No wheezing, rhonchi or rales.   Abdominal:      General: Bowel sounds are normal.      Palpations: Abdomen is soft.   Musculoskeletal:      Right lower leg: No edema.      Left lower leg: No edema.   Skin:     General: Skin is warm and dry.   Neurological:      General: No focal deficit present.      Mental Status: She is alert. Mental status is at baseline. She is disoriented.   Psychiatric:         Mood and Affect: Mood normal.         Behavior: Behavior normal.         Outpatient Follow-Up  Future Appointments   Date Time Provider Department Center   7/22/2025  4:00 PM Michael William MD [id number] [facility]         Rupal Malloy, APRN-CNP

## 2025-07-08 NOTE — PROGRESS NOTES
07/08/25 0746   Discharge Planning   Expected Discharge Disposition SNF   Does the patient need discharge transport arranged? Yes   [facility] coordination needed? Yes   Has discharge transport been arranged? No     Notified by nursing that patient/family are requesting 1) [facility] or 2) [facility]. Referrals submitted to both at this time.     9:07 AM [facility] is reviewing and [facility] has accepted the patient.     11:10 AM  [facility] does not have beds available at this time, but [facility] did accept. Daughter notified via message per her request. Will have  Direct submit team submit for authorization.     1:33 PM   Direct Submit team received authorization for patient to admit to [facility]. Authorization is good for 5 days. Medical team updated to authorization receipt.     1:44 PM  Per provider plan is for discharge today. Daughter updated.     2:01 PM  Discharge order placed. [place] and AVS sent to [facility] for their records. PAS submitted via [facility].     Transportation arranged for 4:30 PM. Nurse and facility updated. Message left for daughter notifying her of the same.

## 2025-07-08 NOTE — CARE PLAN
The patient's goals for the shift include  dc to enf  Problem: Chronic Conditions and Co-morbidities  Goal: Patient's chronic conditions and co-morbidity symptoms are monitored and maintained or improved  Outcome: Progressing     Problem: Nutrition  Goal: Nutrient intake appropriate for maintaining nutritional needs  Outcome: Progressing     Problem: Fall/Injury  Goal: Not fall by end of shift  Outcome: Progressing       The clinical goals for the shift include safety

## 2025-07-10 ENCOUNTER — NURSING HOME VISIT (OUTPATIENT)
Dept: POST ACUTE CARE | Facility: EXTERNAL LOCATION | Age: 80
End: 2025-07-10
Payer: MEDICARE

## 2025-07-10 VITALS
HEART RATE: 74 BPM | RESPIRATION RATE: 16 BRPM | BODY MASS INDEX: 30.9 KG/M2 | TEMPERATURE: 97.6 F | WEIGHT: 158.2 LBS | OXYGEN SATURATION: 96 % | SYSTOLIC BLOOD PRESSURE: 130 MMHG | DIASTOLIC BLOOD PRESSURE: 74 MMHG

## 2025-07-10 DIAGNOSIS — D72.829 LEUKOCYTOSIS, UNSPECIFIED TYPE: Primary | ICD-10-CM

## 2025-07-10 PROCEDURE — 99309 SBSQ NF CARE MODERATE MDM 30: CPT | Performed by: NURSE PRACTITIONER

## 2025-07-10 ASSESSMENT — ENCOUNTER SYMPTOMS
FATIGUE: 1
COUGH: 0

## 2025-07-10 NOTE — LETTER
Patient: Carmen Cao  : 1945    Encounter Date: 07/10/2025    Subjective  Patient ID: Carmen Cao is a 80 y.o. female who presents for Leukocytosis.    Following up with patient who had labs today with an elevated WBC. She denies cough, dysuria, fever, chills. Staff states that she was more alert yesterday and has more incontinence of urine now. Patient is sleeping in bed. She denies pain. Reviewed VS in facility system.          Review of Systems   Constitutional:  Positive for fatigue.   Respiratory:  Negative for cough.    Genitourinary:         Incontinence   Musculoskeletal:  Positive for gait problem.       Objective  /74   Pulse 74   Temp 36.4 °C (97.6 °F)   Resp 16   Wt 71.8 kg (158 lb 3.2 oz)   SpO2 96%   BMI 30.90 kg/m²     Physical Exam  Constitutional:       General: She is not in acute distress.     Appearance: Normal appearance. She is not ill-appearing.   HENT:      Mouth/Throat:      Mouth: Mucous membranes are moist.   Cardiovascular:      Rate and Rhythm: Normal rate and regular rhythm.   Pulmonary:      Effort: Pulmonary effort is normal.      Breath sounds: No wheezing, rhonchi or rales.   Abdominal:      General: Bowel sounds are normal. There is no distension.      Tenderness: There is no abdominal tenderness.   Musculoskeletal:      Comments: Seen in bed   Skin:     General: Skin is warm and dry.         Assessment/Plan  Diagnoses and all orders for this visit:  Leukocytosis, unspecified type  Comments:  Dip urine, if positive send for UA C&S         Electronically Signed By: DARRON Valerio   7/10/25  5:20 PM

## 2025-07-10 NOTE — PROGRESS NOTES
Subjective   Patient ID: Carmen Cao is a 80 y.o. female who presents for Leukocytosis.    Following up with patient who had labs today with an elevated WBC. She denies cough, dysuria, fever, chills. Staff states that she was more alert yesterday and has more incontinence of urine now. Patient is sleeping in bed. She denies pain. Reviewed VS in facility system.          Review of Systems   Constitutional:  Positive for fatigue.   Respiratory:  Negative for cough.    Genitourinary:         Incontinence   Musculoskeletal:  Positive for gait problem.       Objective   /74   Pulse 74   Temp 36.4 °C (97.6 °F)   Resp 16   Wt 71.8 kg (158 lb 3.2 oz)   SpO2 96%   BMI 30.90 kg/m²     Physical Exam  Constitutional:       General: She is not in acute distress.     Appearance: Normal appearance. She is not ill-appearing.   HENT:      Mouth/Throat:      Mouth: Mucous membranes are moist.   Cardiovascular:      Rate and Rhythm: Normal rate and regular rhythm.   Pulmonary:      Effort: Pulmonary effort is normal.      Breath sounds: No wheezing, rhonchi or rales.   Abdominal:      General: Bowel sounds are normal. There is no distension.      Tenderness: There is no abdominal tenderness.   Musculoskeletal:      Comments: Seen in bed   Skin:     General: Skin is warm and dry.         Assessment/Plan   Diagnoses and all orders for this visit:  Leukocytosis, unspecified type  Comments:  Dip urine, if positive send for UA C&S

## 2025-07-11 ENCOUNTER — NURSING HOME VISIT (OUTPATIENT)
Dept: POST ACUTE CARE | Facility: EXTERNAL LOCATION | Age: 80
End: 2025-07-11
Payer: MEDICARE

## 2025-07-11 DIAGNOSIS — D72.829 LEUKOCYTOSIS, UNSPECIFIED TYPE: ICD-10-CM

## 2025-07-11 DIAGNOSIS — I10 PRIMARY HYPERTENSION: Primary | ICD-10-CM

## 2025-07-11 DIAGNOSIS — E87.6 HYPOKALEMIA: ICD-10-CM

## 2025-07-11 DIAGNOSIS — R53.81 PHYSICAL DEBILITY: ICD-10-CM

## 2025-07-11 PROCEDURE — 99305 1ST NF CARE MODERATE MDM 35: CPT | Performed by: FAMILY MEDICINE

## 2025-07-11 NOTE — LETTER
Patient: Carmen Cao  : 1945    Encounter Date: 2025    Subjective  Patient ID: Carmen Cao is a 80 y.o. female who is acute skilled care being seen and evaluated for multiple medical problems.    HPI here for rehab after hospital stay for increased weakness, increased le edema and given increased diuretics. She is here for strengthening. Normal lfts and potassium and gfr in normal range here with hgb 12.3. she offers no new concerns. She did have some leukocytosis and recheck cbc ordered.     Review of Systems   Constitutional:  Positive for fatigue.   HENT:  Positive for sore throat. Negative for congestion.    Respiratory:  Negative for cough and shortness of breath.    Cardiovascular:  Negative for chest pain.   Gastrointestinal:  Negative for abdominal pain.   Genitourinary:         Incontinence   Musculoskeletal:  Positive for gait problem.   Neurological:  Positive for weakness.       Objective  There were no vitals taken for this visit.    Physical Exam  Vitals reviewed: 135/78 97.4 68 wt 158.   Constitutional:       General: She is not in acute distress.     Appearance: Normal appearance. She is not ill-appearing.   HENT:      Mouth/Throat:      Mouth: Mucous membranes are moist.   Cardiovascular:      Rate and Rhythm: Normal rate and regular rhythm.   Pulmonary:      Effort: Pulmonary effort is normal.      Breath sounds: No wheezing, rhonchi or rales.   Abdominal:      General: Bowel sounds are normal. There is no distension.      Tenderness: There is no abdominal tenderness.   Musculoskeletal:      Comments: Seen in bed   Skin:     General: Skin is warm and dry.         Assessment/Plan  Problem List Items Addressed This Visit           ICD-10-CM    Physical debility R53.81    HTN (hypertension) - Primary I10    Hypokalemia E87.6     Other Visit Diagnoses         Codes      Leukocytosis, unspecified type     D72.829        Recheck cbc ordered  Continue pt/ot  Consider cxr or urine if  wbc increase persists   Goals    None         Electronically Signed By: Pili Soriano MD   7/13/25 11:52 PM

## 2025-07-13 ASSESSMENT — ENCOUNTER SYMPTOMS
FATIGUE: 1
SORE THROAT: 1
SHORTNESS OF BREATH: 0
ABDOMINAL PAIN: 0
WEAKNESS: 1
COUGH: 0

## 2025-07-14 NOTE — PROGRESS NOTES
Subjective   Patient ID: Carmen Cao is a 80 y.o. female who is acute skilled care being seen and evaluated for multiple medical problems.    HPI here for rehab after hospital stay for increased weakness, increased le edema and given increased diuretics. She is here for strengthening. Normal lfts and potassium and gfr in normal range here with hgb 12.3. she offers no new concerns. She did have some leukocytosis and recheck cbc ordered.     Review of Systems   Constitutional:  Positive for fatigue.   HENT:  Positive for sore throat. Negative for congestion.    Respiratory:  Negative for cough and shortness of breath.    Cardiovascular:  Negative for chest pain.   Gastrointestinal:  Negative for abdominal pain.   Genitourinary:         Incontinence   Musculoskeletal:  Positive for gait problem.   Neurological:  Positive for weakness.       Objective   There were no vitals taken for this visit.    Physical Exam  Vitals reviewed: 135/78 97.4 68 wt 158.   Constitutional:       General: She is not in acute distress.     Appearance: Normal appearance. She is not ill-appearing.   HENT:      Mouth/Throat:      Mouth: Mucous membranes are moist.   Cardiovascular:      Rate and Rhythm: Normal rate and regular rhythm.   Pulmonary:      Effort: Pulmonary effort is normal.      Breath sounds: No wheezing, rhonchi or rales.   Abdominal:      General: Bowel sounds are normal. There is no distension.      Tenderness: There is no abdominal tenderness.   Musculoskeletal:      Comments: Seen in bed   Skin:     General: Skin is warm and dry.         Assessment/Plan   Problem List Items Addressed This Visit           ICD-10-CM    Physical debility R53.81    HTN (hypertension) - Primary I10    Hypokalemia E87.6     Other Visit Diagnoses         Codes      Leukocytosis, unspecified type     D72.829        Recheck cbc ordered  Continue pt/ot  Consider cxr or urine if wbc increase persists   Goals    None

## 2025-07-22 ENCOUNTER — APPOINTMENT (OUTPATIENT)
Dept: PRIMARY CARE | Facility: CLINIC | Age: 80
End: 2025-07-22
Payer: MEDICARE

## 2025-07-23 ENCOUNTER — NURSING HOME VISIT (OUTPATIENT)
Dept: POST ACUTE CARE | Facility: EXTERNAL LOCATION | Age: 80
End: 2025-07-23
Payer: MEDICARE

## 2025-07-23 ENCOUNTER — TELEPHONE (OUTPATIENT)
Dept: PRIMARY CARE | Facility: CLINIC | Age: 80
End: 2025-07-23
Payer: MEDICARE

## 2025-07-23 DIAGNOSIS — D72.829 LEUKOCYTOSIS, UNSPECIFIED TYPE: ICD-10-CM

## 2025-07-23 DIAGNOSIS — M15.0 PRIMARY OSTEOARTHRITIS INVOLVING MULTIPLE JOINTS: ICD-10-CM

## 2025-07-23 DIAGNOSIS — E03.9 HYPOTHYROIDISM, UNSPECIFIED TYPE: ICD-10-CM

## 2025-07-23 DIAGNOSIS — R26.9 GAIT DISORDER: Primary | ICD-10-CM

## 2025-07-23 PROCEDURE — 99309 SBSQ NF CARE MODERATE MDM 30: CPT | Performed by: NURSE PRACTITIONER

## 2025-07-23 NOTE — TELEPHONE ENCOUNTER
"Patient daughter called with request to speak with provider. Advised provider on PTO and NP covering would be notified.     Daughter concerned with her moms ability to ambulate with walker declining. She is requesting her mothers Tramadol be stopped and that it be replaced with Ibuprofen 400 mg twice daily. She reports her mother is \" extremely sensitive to medications\"   Daughter does acknowledge that her mother is currently getting physical therapy.    She is requesting also that her mom be \"referred to Neurology for evaluation of gait and shakiness instead of Psychiatry.\"    She is requesting that the facility notify her when the order to stop the tramadol and start the ibuprofen starts.  "

## 2025-07-23 NOTE — LETTER
Patient: Carmen Cao  : 1945    Encounter Date: 2025    Subjective  Patient ID: Carmen Cao is a 80 y.o. female who presents for Pain.    Asked to see patient who's daughter contact Dr office regarding stopping tramadol and starting ibuprofen for the patient. She is also asking to have a neuro referral for the patient. Patient currently is denying pain and states that she just feels tired. She denies dysuria, cough, loose stools. It is record that she has been eating variably. Last time she received tramadol was 25. This order is to stop today but will discontinue now. Staff to call daughter with updates and infer if patient has seen a neurologist in the past to set up appointment with          Review of Systems   Musculoskeletal:  Positive for gait problem.       Objective  /70   Pulse 78   Temp 36.7 °C (98 °F)   Resp 18   Wt 69.5 kg (153 lb 3.2 oz)   SpO2 96%   BMI 29.92 kg/m²     Physical Exam  Constitutional:       General: She is not in acute distress.     Appearance: She is not ill-appearing.   HENT:      Mouth/Throat:      Mouth: Mucous membranes are moist.     Cardiovascular:      Rate and Rhythm: Normal rate.   Pulmonary:      Effort: Pulmonary effort is normal.      Breath sounds: No wheezing, rhonchi or rales.   Abdominal:      General: There is no distension.      Palpations: Abdomen is soft.      Tenderness: There is no abdominal tenderness.     Musculoskeletal:      Comments: Seen in bed, able to reposition self with minimal assistance     Skin:     General: Skin is warm and dry.     Neurological:      Mental Status: She is alert.      Comments: No unilateral weakness   Psychiatric:         Mood and Affect: Affect is flat.         Assessment/Plan  Diagnoses and all orders for this visit:  Gait disorder  Comments:  cont with PT/OT  Leukocytosis, unspecified type  Comments:  resolved on labs today 25, repeat labs 25  Primary osteoarthritis involving  multiple joints  Comments:  stop tramadol, start ibuprofen 400mg BID, monitor renal function, GI side effects  Hypothyroidism, unspecified type  Comments:  check TSH, T4 7/25/25         Electronically Signed By: DARRON Valerio   7/24/25  4:44 PM

## 2025-07-24 VITALS
BODY MASS INDEX: 29.92 KG/M2 | OXYGEN SATURATION: 96 % | TEMPERATURE: 98 F | WEIGHT: 153.2 LBS | DIASTOLIC BLOOD PRESSURE: 70 MMHG | RESPIRATION RATE: 18 BRPM | SYSTOLIC BLOOD PRESSURE: 122 MMHG | HEART RATE: 78 BPM

## 2025-07-24 NOTE — PROGRESS NOTES
Subjective   Patient ID: Carmen Cao is a 80 y.o. female who presents for Pain.    Asked to see patient who's daughter contact Dr office regarding stopping tramadol and starting ibuprofen for the patient. She is also asking to have a neuro referral for the patient. Patient currently is denying pain and states that she just feels tired. She denies dysuria, cough, loose stools. It is record that she has been eating variably. Last time she received tramadol was 7/21/25. This order is to stop today but will discontinue now. Staff to call daughter with updates and infer if patient has seen a neurologist in the past to set up appointment with          Review of Systems   Musculoskeletal:  Positive for gait problem.       Objective   /70   Pulse 78   Temp 36.7 °C (98 °F)   Resp 18   Wt 69.5 kg (153 lb 3.2 oz)   SpO2 96%   BMI 29.92 kg/m²     Physical Exam  Constitutional:       General: She is not in acute distress.     Appearance: She is not ill-appearing.   HENT:      Mouth/Throat:      Mouth: Mucous membranes are moist.     Cardiovascular:      Rate and Rhythm: Normal rate.   Pulmonary:      Effort: Pulmonary effort is normal.      Breath sounds: No wheezing, rhonchi or rales.   Abdominal:      General: There is no distension.      Palpations: Abdomen is soft.      Tenderness: There is no abdominal tenderness.     Musculoskeletal:      Comments: Seen in bed, able to reposition self with minimal assistance     Skin:     General: Skin is warm and dry.     Neurological:      Mental Status: She is alert.      Comments: No unilateral weakness   Psychiatric:         Mood and Affect: Affect is flat.         Assessment/Plan   Diagnoses and all orders for this visit:  Gait disorder  Comments:  cont with PT/OT  Leukocytosis, unspecified type  Comments:  resolved on labs today 7/23/25, repeat labs 7/30/25  Primary osteoarthritis involving multiple joints  Comments:  stop tramadol, start ibuprofen 400mg BID, monitor  renal function, GI side effects  Hypothyroidism, unspecified type  Comments:  check TSH, T4 7/25/25

## 2025-07-29 ENCOUNTER — TELEPHONE (OUTPATIENT)
Dept: PRIMARY CARE | Facility: CLINIC | Age: 80
End: 2025-07-29
Payer: MEDICARE

## 2025-07-29 DIAGNOSIS — R41.82 ALTERED MENTAL STATUS, UNSPECIFIED ALTERED MENTAL STATUS TYPE: ICD-10-CM

## 2025-07-30 ENCOUNTER — NURSING HOME VISIT (OUTPATIENT)
Dept: POST ACUTE CARE | Facility: EXTERNAL LOCATION | Age: 80
End: 2025-07-30
Payer: MEDICARE

## 2025-07-30 DIAGNOSIS — K64.9 HEMORRHOIDS, UNSPECIFIED HEMORRHOID TYPE: Primary | ICD-10-CM

## 2025-07-30 PROCEDURE — 99308 SBSQ NF CARE LOW MDM 20: CPT | Performed by: NURSE PRACTITIONER

## 2025-07-30 NOTE — LETTER
Patient: Carmen Cao  : 1945    Encounter Date: 2025    Subjective  Patient ID: Carmen Cao is a 80 y.o. female who presents for Hemorrhoids.    Asked to see patient who is complaining of hemorrhoidal pain. She states that she is unsure how long its been present. Staff states that she complained about it yesterday. Patient states that it burns inside her rectum and not outside. There is a box of preparation H suppositories at patient's bedside and an order for them as needed. Patient does not think she has had any. Staff has not noticed any blood when changing her. It is documented that she has daily BM's except for 25. Seen along with nurses aide to help with rolling side to side in bed         Review of Systems   Gastrointestinal:  Positive for rectal pain.   Musculoskeletal:  Positive for gait problem.       Objective  /68   Pulse 84   Temp 36.7 °C (98 °F)   Resp 18   Wt 70.7 kg (155 lb 12.8 oz)   SpO2 96%   BMI 30.43 kg/m²     Physical Exam  Constitutional:       General: She is not in acute distress.     Appearance: She is not ill-appearing.   HENT:      Mouth/Throat:      Mouth: Mucous membranes are moist.   Pulmonary:      Effort: Pulmonary effort is normal.   Abdominal:      General: There is no distension.      Palpations: Abdomen is soft.      Tenderness: There is no abdominal tenderness.   Genitourinary:     Rectum: Tenderness present. No external hemorrhoid.      Comments: Did not want internal exam due to discomfort    Musculoskeletal:      Comments: Seen in bed, able to roll side to side     Skin:     General: Skin is warm and dry.     Neurological:      Mental Status: She is alert.     Psychiatric:         Mood and Affect: Affect is flat.         Assessment/Plan  Diagnoses and all orders for this visit:  Hemorrhoids, unspecified hemorrhoid type  Comments:  preparation H supp BID for 7 days, no improvement or worsened then GI consult         Electronically Signed  By: Nieves Avendano, ERICA-CNP   7/31/25  3:19 PM

## 2025-07-31 VITALS
TEMPERATURE: 98 F | RESPIRATION RATE: 18 BRPM | BODY MASS INDEX: 30.43 KG/M2 | WEIGHT: 155.8 LBS | OXYGEN SATURATION: 96 % | HEART RATE: 84 BPM | DIASTOLIC BLOOD PRESSURE: 68 MMHG | SYSTOLIC BLOOD PRESSURE: 128 MMHG

## 2025-07-31 ASSESSMENT — ENCOUNTER SYMPTOMS: RECTAL PAIN: 1

## 2025-07-31 NOTE — PROGRESS NOTES
Subjective   Patient ID: Carmen Cao is a 80 y.o. female who presents for Hemorrhoids.    Asked to see patient who is complaining of hemorrhoidal pain. She states that she is unsure how long its been present. Staff states that she complained about it yesterday. Patient states that it burns inside her rectum and not outside. There is a box of preparation H suppositories at patient's bedside and an order for them as needed. Patient does not think she has had any. Staff has not noticed any blood when changing her. It is documented that she has daily BM's except for 7/27/25. Seen along with nurses aide to help with rolling side to side in bed         Review of Systems   Gastrointestinal:  Positive for rectal pain.   Musculoskeletal:  Positive for gait problem.       Objective   /68   Pulse 84   Temp 36.7 °C (98 °F)   Resp 18   Wt 70.7 kg (155 lb 12.8 oz)   SpO2 96%   BMI 30.43 kg/m²     Physical Exam  Constitutional:       General: She is not in acute distress.     Appearance: She is not ill-appearing.   HENT:      Mouth/Throat:      Mouth: Mucous membranes are moist.   Pulmonary:      Effort: Pulmonary effort is normal.   Abdominal:      General: There is no distension.      Palpations: Abdomen is soft.      Tenderness: There is no abdominal tenderness.   Genitourinary:     Rectum: Tenderness present. No external hemorrhoid.      Comments: Did not want internal exam due to discomfort    Musculoskeletal:      Comments: Seen in bed, able to roll side to side     Skin:     General: Skin is warm and dry.     Neurological:      Mental Status: She is alert.     Psychiatric:         Mood and Affect: Affect is flat.         Assessment/Plan   Diagnoses and all orders for this visit:  Hemorrhoids, unspecified hemorrhoid type  Comments:  preparation H supp BID for 7 days, no improvement or worsened then GI consult